# Patient Record
Sex: MALE | Race: WHITE | Employment: FULL TIME | URBAN - METROPOLITAN AREA
[De-identification: names, ages, dates, MRNs, and addresses within clinical notes are randomized per-mention and may not be internally consistent; named-entity substitution may affect disease eponyms.]

---

## 2017-09-29 ENCOUNTER — OFFICE VISIT (OUTPATIENT)
Dept: FAMILY MEDICINE CLINIC | Age: 37
End: 2017-09-29

## 2017-09-29 VITALS
HEIGHT: 65 IN | DIASTOLIC BLOOD PRESSURE: 70 MMHG | SYSTOLIC BLOOD PRESSURE: 108 MMHG | HEART RATE: 70 BPM | WEIGHT: 182 LBS | RESPIRATION RATE: 14 BRPM | BODY MASS INDEX: 30.32 KG/M2

## 2017-09-29 DIAGNOSIS — J30.1 SEASONAL ALLERGIC RHINITIS DUE TO POLLEN: ICD-10-CM

## 2017-09-29 DIAGNOSIS — Z23 NEED FOR INFLUENZA VACCINATION: ICD-10-CM

## 2017-09-29 DIAGNOSIS — Z00.00 WELL ADULT EXAM: Primary | ICD-10-CM

## 2017-09-29 DIAGNOSIS — E78.2 MIXED HYPERLIPIDEMIA: ICD-10-CM

## 2017-09-29 DIAGNOSIS — E55.9 VITAMIN D DEFICIENCY: ICD-10-CM

## 2017-09-29 LAB
A/G RATIO: 2.2 (ref 1.1–2.2)
ALBUMIN SERPL-MCNC: 4.8 G/DL (ref 3.4–5)
ALP BLD-CCNC: 67 U/L (ref 40–129)
ALT SERPL-CCNC: 15 U/L (ref 10–40)
ANION GAP SERPL CALCULATED.3IONS-SCNC: 16 MMOL/L (ref 3–16)
AST SERPL-CCNC: 16 U/L (ref 15–37)
BILIRUB SERPL-MCNC: 0.4 MG/DL (ref 0–1)
BUN BLDV-MCNC: 13 MG/DL (ref 7–20)
CALCIUM SERPL-MCNC: 9.7 MG/DL (ref 8.3–10.6)
CHLORIDE BLD-SCNC: 102 MMOL/L (ref 99–110)
CHOLESTEROL, TOTAL: 185 MG/DL (ref 0–199)
CO2: 24 MMOL/L (ref 21–32)
CREAT SERPL-MCNC: 0.9 MG/DL (ref 0.9–1.3)
GFR AFRICAN AMERICAN: >60
GFR NON-AFRICAN AMERICAN: >60
GLOBULIN: 2.2 G/DL
GLUCOSE BLD-MCNC: 90 MG/DL (ref 70–99)
HDLC SERPL-MCNC: 56 MG/DL (ref 40–60)
LDL CHOLESTEROL CALCULATED: 103 MG/DL
POTASSIUM SERPL-SCNC: 4.2 MMOL/L (ref 3.5–5.1)
SODIUM BLD-SCNC: 142 MMOL/L (ref 136–145)
TOTAL PROTEIN: 7 G/DL (ref 6.4–8.2)
TRIGL SERPL-MCNC: 129 MG/DL (ref 0–150)
VITAMIN D 25-HYDROXY: 22.4 NG/ML
VLDLC SERPL CALC-MCNC: 26 MG/DL

## 2017-09-29 PROCEDURE — 36415 COLL VENOUS BLD VENIPUNCTURE: CPT | Performed by: FAMILY MEDICINE

## 2017-09-29 PROCEDURE — 90471 IMMUNIZATION ADMIN: CPT | Performed by: FAMILY MEDICINE

## 2017-09-29 PROCEDURE — 90630 INFLUENZA, QUADV, 18-64 YRS, ID, PF, MICRO INJ, 0.1ML (FLUZONE QUADV, PF): CPT | Performed by: FAMILY MEDICINE

## 2017-09-29 PROCEDURE — 99395 PREV VISIT EST AGE 18-39: CPT | Performed by: FAMILY MEDICINE

## 2017-09-29 RX ORDER — FENOFIBRATE 160 MG/1
TABLET ORAL
Qty: 30 TABLET | Refills: 2 | OUTPATIENT
Start: 2017-09-29

## 2017-09-29 ASSESSMENT — PATIENT HEALTH QUESTIONNAIRE - PHQ9
SUM OF ALL RESPONSES TO PHQ9 QUESTIONS 1 & 2: 0
SUM OF ALL RESPONSES TO PHQ QUESTIONS 1-9: 0
1. LITTLE INTEREST OR PLEASURE IN DOING THINGS: 0
2. FEELING DOWN, DEPRESSED OR HOPELESS: 0

## 2017-10-02 RX ORDER — FENOFIBRATE 160 MG/1
TABLET ORAL
Qty: 30 TABLET | Refills: 2 | Status: SHIPPED | OUTPATIENT
Start: 2017-10-02 | End: 2017-12-28 | Stop reason: SDUPTHER

## 2018-01-01 RX ORDER — FENOFIBRATE 160 MG/1
TABLET ORAL
Qty: 30 TABLET | Refills: 8 | Status: SHIPPED | OUTPATIENT
Start: 2018-01-01 | End: 2018-11-24 | Stop reason: SDUPTHER

## 2018-04-16 ENCOUNTER — OFFICE VISIT (OUTPATIENT)
Dept: FAMILY MEDICINE CLINIC | Age: 38
End: 2018-04-16

## 2018-04-16 VITALS
SYSTOLIC BLOOD PRESSURE: 110 MMHG | HEART RATE: 84 BPM | OXYGEN SATURATION: 98 % | WEIGHT: 178 LBS | HEIGHT: 65 IN | BODY MASS INDEX: 29.66 KG/M2 | DIASTOLIC BLOOD PRESSURE: 70 MMHG | RESPIRATION RATE: 20 BRPM

## 2018-04-16 DIAGNOSIS — L01.00 IMPETIGO: Primary | ICD-10-CM

## 2018-04-16 PROCEDURE — 99213 OFFICE O/P EST LOW 20 MIN: CPT | Performed by: NURSE PRACTITIONER

## 2018-04-23 ENCOUNTER — OFFICE VISIT (OUTPATIENT)
Dept: FAMILY MEDICINE CLINIC | Age: 38
End: 2018-04-23

## 2018-04-23 VITALS
HEART RATE: 89 BPM | DIASTOLIC BLOOD PRESSURE: 82 MMHG | RESPIRATION RATE: 20 BRPM | OXYGEN SATURATION: 98 % | SYSTOLIC BLOOD PRESSURE: 122 MMHG | WEIGHT: 177.6 LBS | HEIGHT: 65 IN | BODY MASS INDEX: 29.59 KG/M2

## 2018-04-23 DIAGNOSIS — L98.9 SKIN LESION OF CHEEK: Primary | ICD-10-CM

## 2018-04-23 PROCEDURE — 99213 OFFICE O/P EST LOW 20 MIN: CPT | Performed by: NURSE PRACTITIONER

## 2018-04-23 RX ORDER — SULFAMETHOXAZOLE AND TRIMETHOPRIM 800; 160 MG/1; MG/1
1 TABLET ORAL 2 TIMES DAILY
Qty: 14 TABLET | Refills: 0 | Status: SHIPPED | OUTPATIENT
Start: 2018-04-23 | End: 2018-04-30

## 2018-04-26 LAB
GRAM STAIN RESULT: ABNORMAL
WOUND/ABSCESS: ABNORMAL

## 2018-10-03 ENCOUNTER — OFFICE VISIT (OUTPATIENT)
Dept: FAMILY MEDICINE CLINIC | Age: 38
End: 2018-10-03
Payer: COMMERCIAL

## 2018-10-03 VITALS
SYSTOLIC BLOOD PRESSURE: 116 MMHG | RESPIRATION RATE: 16 BRPM | OXYGEN SATURATION: 98 % | HEIGHT: 65 IN | DIASTOLIC BLOOD PRESSURE: 80 MMHG | TEMPERATURE: 98.5 F | WEIGHT: 175.6 LBS | BODY MASS INDEX: 29.26 KG/M2 | HEART RATE: 62 BPM

## 2018-10-03 DIAGNOSIS — E78.2 MIXED HYPERLIPIDEMIA: ICD-10-CM

## 2018-10-03 DIAGNOSIS — Z00.00 ANNUAL PHYSICAL EXAM: Primary | ICD-10-CM

## 2018-10-03 DIAGNOSIS — Z23 NEEDS FLU SHOT: ICD-10-CM

## 2018-10-03 DIAGNOSIS — E55.9 VITAMIN D DEFICIENCY: ICD-10-CM

## 2018-10-03 DIAGNOSIS — L30.9 ECZEMA, UNSPECIFIED TYPE: ICD-10-CM

## 2018-10-03 LAB
A/G RATIO: 2.6 (ref 1.1–2.2)
ALBUMIN SERPL-MCNC: 4.9 G/DL (ref 3.4–5)
ALP BLD-CCNC: 74 U/L (ref 40–129)
ALT SERPL-CCNC: 15 U/L (ref 10–40)
ANION GAP SERPL CALCULATED.3IONS-SCNC: 13 MMOL/L (ref 3–16)
AST SERPL-CCNC: 19 U/L (ref 15–37)
BILIRUB SERPL-MCNC: 0.4 MG/DL (ref 0–1)
BUN BLDV-MCNC: 13 MG/DL (ref 7–20)
CALCIUM SERPL-MCNC: 9.7 MG/DL (ref 8.3–10.6)
CHLORIDE BLD-SCNC: 106 MMOL/L (ref 99–110)
CHOLESTEROL, TOTAL: 157 MG/DL (ref 0–199)
CO2: 25 MMOL/L (ref 21–32)
CREAT SERPL-MCNC: 1 MG/DL (ref 0.9–1.3)
GFR AFRICAN AMERICAN: >60
GFR NON-AFRICAN AMERICAN: >60
GLOBULIN: 1.9 G/DL
GLUCOSE BLD-MCNC: 78 MG/DL (ref 70–99)
HDLC SERPL-MCNC: 56 MG/DL (ref 40–60)
LDL CHOLESTEROL CALCULATED: 83 MG/DL
POTASSIUM SERPL-SCNC: 4.4 MMOL/L (ref 3.5–5.1)
SODIUM BLD-SCNC: 144 MMOL/L (ref 136–145)
TOTAL PROTEIN: 6.8 G/DL (ref 6.4–8.2)
TRIGL SERPL-MCNC: 91 MG/DL (ref 0–150)
VITAMIN D 25-HYDROXY: 33.7 NG/ML
VLDLC SERPL CALC-MCNC: 18 MG/DL

## 2018-10-03 PROCEDURE — 99395 PREV VISIT EST AGE 18-39: CPT | Performed by: NURSE PRACTITIONER

## 2018-10-03 PROCEDURE — 90471 IMMUNIZATION ADMIN: CPT | Performed by: NURSE PRACTITIONER

## 2018-10-03 PROCEDURE — 90688 IIV4 VACCINE SPLT 0.5 ML IM: CPT | Performed by: NURSE PRACTITIONER

## 2018-10-03 PROCEDURE — 36415 COLL VENOUS BLD VENIPUNCTURE: CPT | Performed by: NURSE PRACTITIONER

## 2018-10-03 ASSESSMENT — PATIENT HEALTH QUESTIONNAIRE - PHQ9
2. FEELING DOWN, DEPRESSED OR HOPELESS: 0
SUM OF ALL RESPONSES TO PHQ9 QUESTIONS 1 & 2: 0
SUM OF ALL RESPONSES TO PHQ QUESTIONS 1-9: 0
1. LITTLE INTEREST OR PLEASURE IN DOING THINGS: 0
SUM OF ALL RESPONSES TO PHQ QUESTIONS 1-9: 0

## 2018-10-07 LAB
2000687N OAK TREE IGE: 3.66 KU/L
ALLERGEN ASPERGILLUS ALTERNATA IGE: <0.1 KU/L
ALLERGEN ASPERGILLUS FUMIGATUS IGE: <0.1 KU/L
ALLERGEN BERMUDA GRASS IGE: 0.23 KU/L
ALLERGEN BIRCH IGE: 2.77 KU/L
ALLERGEN CAT DANDER IGE: 0.49 KU/L
ALLERGEN CLAMS IGE: <0.1 KU/L
ALLERGEN CODFISH IGE: <0.1 KU/L
ALLERGEN COMMON SHORT RAGWEED IGE: 0.34 KU/L
ALLERGEN CORN IGE: 0.53 KU/L
ALLERGEN COTTONWOOD: 0.16 KU/L
ALLERGEN COW MILK IGE: <0.1 KU/L
ALLERGEN DOG DANDER IGE: 1.24 KU/L
ALLERGEN EGG WHITE IGE: <0.1 KU/L
ALLERGEN ELM IGE: 1.38 KU/L
ALLERGEN FUNGI/MOLD M.RACEMOSUS IGE: <0.1 KU/L
ALLERGEN GERMAN COCKROACH IGE: <0.1 KU/L
ALLERGEN HORMODENDRUM HORDEI IGE: <0.1 KU/L
ALLERGEN MAPLE/BOX ELDER IGE: 1.58 KU/L
ALLERGEN MITE DUST FARINAE IGE: <0.1 KU/L
ALLERGEN MITE DUST PTERONYSSINUS IGE: <0.1 KU/L
ALLERGEN MOUNTAIN CEDAR: 0.12 KU/L
ALLERGEN MOUSE EPITHELIA IGE: <0.1 KU/L
ALLERGEN PEANUT (F13) IGE: 0.23 KU/L
ALLERGEN PECAN TREE IGE: 0.92 KU/L
ALLERGEN PENICILLIUM NOTATUM: <0.1 KU/L
ALLERGEN ROUGH PIGWEED (W14) IGE: 0.31 KU/L
ALLERGEN RUSSIAN THISTLE IGE: 0.25 KU/L
ALLERGEN SCALLOP IGE: <0.1 KU/L
ALLERGEN SEE NOTE: ABNORMAL
ALLERGEN SHEEP SORREL (W18) IGE: <0.1 KU/L
ALLERGEN SHRIMP IGE: <0.1 KU/L
ALLERGEN SOYBEAN IGE: <0.1 KU/L
ALLERGEN TIMOTHY GRASS: <0.1 KU/L
ALLERGEN TREE SYCAMORE: 0.33 KU/L
ALLERGEN WALNUT IGE: 0.54 KU/L
ALLERGEN WALNUT TREE IGE: 0.81 KU/L
ALLERGEN WHEAT IGE: 0.12 KU/L
ALLERGEN WHITE MULBERRY TREE, IGE: 0.24 KU/L
ALLERGEN, TREE, WHITE ASH IGE: 0.19 KU/L
IGE: 61 KU/L

## 2018-11-26 RX ORDER — FENOFIBRATE 160 MG/1
TABLET ORAL
Qty: 30 TABLET | Refills: 7 | Status: SHIPPED | OUTPATIENT
Start: 2018-11-26 | End: 2018-12-03 | Stop reason: ALTCHOICE

## 2018-12-03 ENCOUNTER — OFFICE VISIT (OUTPATIENT)
Dept: FAMILY MEDICINE CLINIC | Age: 38
End: 2018-12-03
Payer: COMMERCIAL

## 2018-12-03 VITALS — WEIGHT: 182.2 LBS | BODY MASS INDEX: 30.35 KG/M2 | HEIGHT: 65 IN

## 2018-12-03 DIAGNOSIS — Z11.59 NEED FOR HEPATITIS B SCREENING TEST: ICD-10-CM

## 2018-12-03 DIAGNOSIS — L30.9 CHRONIC DERMATITIS: Primary | ICD-10-CM

## 2018-12-03 DIAGNOSIS — Z11.59 ENCOUNTER FOR HEPATITIS C SCREENING TEST FOR LOW RISK PATIENT: ICD-10-CM

## 2018-12-03 DIAGNOSIS — Z11.4 SCREENING FOR HIV (HUMAN IMMUNODEFICIENCY VIRUS): ICD-10-CM

## 2018-12-03 DIAGNOSIS — Z11.3 SCREENING EXAMINATION FOR STD (SEXUALLY TRANSMITTED DISEASE): ICD-10-CM

## 2018-12-03 DIAGNOSIS — Z12.5 SCREENING PSA (PROSTATE SPECIFIC ANTIGEN): ICD-10-CM

## 2018-12-03 LAB
BASOPHILS ABSOLUTE: 0 K/UL (ref 0–0.2)
BASOPHILS RELATIVE PERCENT: 0.5 %
EOSINOPHILS ABSOLUTE: 0.1 K/UL (ref 0–0.6)
EOSINOPHILS RELATIVE PERCENT: 1.9 %
HCT VFR BLD CALC: 44.2 % (ref 40.5–52.5)
HEMOGLOBIN: 14.7 G/DL (ref 13.5–17.5)
HEPATITIS B SURFACE ANTIGEN INTERPRETATION: NORMAL
HEPATITIS C ANTIBODY INTERPRETATION: NORMAL
LYMPHOCYTES ABSOLUTE: 1.5 K/UL (ref 1–5.1)
LYMPHOCYTES RELATIVE PERCENT: 27.3 %
MCH RBC QN AUTO: 30.7 PG (ref 26–34)
MCHC RBC AUTO-ENTMCNC: 33.1 G/DL (ref 31–36)
MCV RBC AUTO: 92.7 FL (ref 80–100)
MONOCYTES ABSOLUTE: 0.4 K/UL (ref 0–1.3)
MONOCYTES RELATIVE PERCENT: 6.9 %
NEUTROPHILS ABSOLUTE: 3.5 K/UL (ref 1.7–7.7)
NEUTROPHILS RELATIVE PERCENT: 63.4 %
PDW BLD-RTO: 12.9 % (ref 12.4–15.4)
PLATELET # BLD: 218 K/UL (ref 135–450)
PMV BLD AUTO: 9.1 FL (ref 5–10.5)
PROSTATE SPECIFIC ANTIGEN: 1 NG/ML (ref 0–4)
RBC # BLD: 4.77 M/UL (ref 4.2–5.9)
WBC # BLD: 5.5 K/UL (ref 4–11)

## 2018-12-03 PROCEDURE — 99213 OFFICE O/P EST LOW 20 MIN: CPT | Performed by: NURSE PRACTITIONER

## 2018-12-03 PROCEDURE — 36415 COLL VENOUS BLD VENIPUNCTURE: CPT | Performed by: NURSE PRACTITIONER

## 2018-12-04 LAB
HIV AG/AB: NORMAL
HIV ANTIGEN: NORMAL
HIV-1 ANTIBODY: NORMAL
HIV-2 AB: NORMAL
TOTAL SYPHILLIS IGG/IGM: NORMAL

## 2020-01-20 ENCOUNTER — OFFICE VISIT (OUTPATIENT)
Dept: PRIMARY CARE CLINIC | Age: 40
End: 2020-01-20
Payer: COMMERCIAL

## 2020-01-20 VITALS
SYSTOLIC BLOOD PRESSURE: 118 MMHG | BODY MASS INDEX: 31.49 KG/M2 | HEART RATE: 68 BPM | DIASTOLIC BLOOD PRESSURE: 78 MMHG | OXYGEN SATURATION: 99 % | HEIGHT: 65 IN | WEIGHT: 189 LBS | TEMPERATURE: 97.7 F

## 2020-01-20 DIAGNOSIS — E78.2 MIXED HYPERLIPIDEMIA: Chronic | ICD-10-CM

## 2020-01-20 DIAGNOSIS — E78.1 HYPERTRIGLYCERIDEMIA: ICD-10-CM

## 2020-01-20 PROBLEM — L20.84 INTRINSIC ATOPIC DERMATITIS: Chronic | Status: ACTIVE | Noted: 2020-01-20

## 2020-01-20 LAB
A/G RATIO: 2 (ref 1.1–2.2)
ALBUMIN SERPL-MCNC: 4.6 G/DL (ref 3.4–5)
ALP BLD-CCNC: 78 U/L (ref 40–129)
ALT SERPL-CCNC: 18 U/L (ref 10–40)
ANION GAP SERPL CALCULATED.3IONS-SCNC: 14 MMOL/L (ref 3–16)
AST SERPL-CCNC: 19 U/L (ref 15–37)
BASOPHILS ABSOLUTE: 0 K/UL (ref 0–0.2)
BASOPHILS RELATIVE PERCENT: 0.4 %
BILIRUB SERPL-MCNC: 0.4 MG/DL (ref 0–1)
BUN BLDV-MCNC: 15 MG/DL (ref 7–20)
CALCIUM SERPL-MCNC: 9.5 MG/DL (ref 8.3–10.6)
CHLORIDE BLD-SCNC: 100 MMOL/L (ref 99–110)
CHOLESTEROL, TOTAL: 212 MG/DL (ref 0–199)
CO2: 25 MMOL/L (ref 21–32)
CREAT SERPL-MCNC: 0.8 MG/DL (ref 0.9–1.3)
EOSINOPHILS ABSOLUTE: 0.1 K/UL (ref 0–0.6)
EOSINOPHILS RELATIVE PERCENT: 2 %
GFR AFRICAN AMERICAN: >60
GFR NON-AFRICAN AMERICAN: >60
GLOBULIN: 2.3 G/DL
GLUCOSE BLD-MCNC: 86 MG/DL (ref 70–99)
HCT VFR BLD CALC: 44.8 % (ref 40.5–52.5)
HDLC SERPL-MCNC: 49 MG/DL (ref 40–60)
HEMOGLOBIN: 15.1 G/DL (ref 13.5–17.5)
LDL CHOLESTEROL CALCULATED: 129 MG/DL
LYMPHOCYTES ABSOLUTE: 1.6 K/UL (ref 1–5.1)
LYMPHOCYTES RELATIVE PERCENT: 38.5 %
MCH RBC QN AUTO: 31.4 PG (ref 26–34)
MCHC RBC AUTO-ENTMCNC: 33.7 G/DL (ref 31–36)
MCV RBC AUTO: 92.9 FL (ref 80–100)
MONOCYTES ABSOLUTE: 0.3 K/UL (ref 0–1.3)
MONOCYTES RELATIVE PERCENT: 8.1 %
NEUTROPHILS ABSOLUTE: 2.1 K/UL (ref 1.7–7.7)
NEUTROPHILS RELATIVE PERCENT: 51 %
PDW BLD-RTO: 12.9 % (ref 12.4–15.4)
PLATELET # BLD: 186 K/UL (ref 135–450)
PMV BLD AUTO: 8.5 FL (ref 5–10.5)
POTASSIUM SERPL-SCNC: 4.2 MMOL/L (ref 3.5–5.1)
RBC # BLD: 4.83 M/UL (ref 4.2–5.9)
SODIUM BLD-SCNC: 139 MMOL/L (ref 136–145)
TOTAL PROTEIN: 6.9 G/DL (ref 6.4–8.2)
TRIGL SERPL-MCNC: 169 MG/DL (ref 0–150)
TSH SERPL DL<=0.05 MIU/L-ACNC: 1.19 UIU/ML (ref 0.27–4.2)
VLDLC SERPL CALC-MCNC: 34 MG/DL
WBC # BLD: 4.1 K/UL (ref 4–11)

## 2020-01-20 PROCEDURE — 99203 OFFICE O/P NEW LOW 30 MIN: CPT | Performed by: INTERNAL MEDICINE

## 2020-01-20 RX ORDER — DUPILUMAB 300 MG/2ML
300 INJECTION, SOLUTION SUBCUTANEOUS
COMMUNITY
Start: 2019-12-31

## 2020-01-20 ASSESSMENT — PATIENT HEALTH QUESTIONNAIRE - PHQ9
SUM OF ALL RESPONSES TO PHQ QUESTIONS 1-9: 0
2. FEELING DOWN, DEPRESSED OR HOPELESS: 0
SUM OF ALL RESPONSES TO PHQ QUESTIONS 1-9: 0
SUM OF ALL RESPONSES TO PHQ9 QUESTIONS 1 & 2: 0
1. LITTLE INTEREST OR PLEASURE IN DOING THINGS: 0

## 2020-01-20 NOTE — ASSESSMENT & PLAN NOTE
On dupixent, injections, sees allergy specialist,   Patient is compliant w medications, no side effects, effective, provides adequate symptom relief. No new symptoms or problems as noted by patient. The problem is stable, no changes noted by patient. Will consider monitoring labs and refill medications as appropriate. Patient counseled and will continue current plan.

## 2020-01-20 NOTE — PROGRESS NOTES
Non-medical: Not on file   Tobacco Use    Smoking status: Never Smoker    Smokeless tobacco: Former User   Substance and Sexual Activity    Alcohol use: Yes     Comment: occasional - 2-3 drinks weekly    Drug use: No    Sexual activity: Yes     Partners: Female   Lifestyle    Physical activity:     Days per week: Not on file     Minutes per session: Not on file    Stress: Not on file   Relationships    Social connections:     Talks on phone: Not on file     Gets together: Not on file     Attends Pent[employer]ostalism service: Not on file     Active member of club or organization: Not on file     Attends meetings of clubs or organizations: Not on file     Relationship status: Not on file    Intimate partner violence:     Fear of current or ex partner: Not on file     Emotionally abused: Not on file     Physically abused: Not on file     Forced sexual activity: Not on file   Other Topics Concern    Not on file   Social History Narrative    Not on file       Family History   Problem Relation Age of Onset    Heart Disease Father 48        ami    High Blood Pressure Father     High Cholesterol Father     Heart Failure Paternal Grandfather [de-identified]    Heart Disease Paternal Grandfather         CHF    High Blood Pressure Paternal Grandfather     High Cholesterol Paternal Grandfather     High Blood Pressure Mother     High Cholesterol Mother     High Cholesterol Brother     Emphysema Maternal Grandmother     High Blood Pressure Maternal Grandmother     High Cholesterol Maternal Grandmother     High Blood Pressure Maternal Grandfather     High Cholesterol Maternal Grandfather     Heart Disease Paternal Grandmother         CHF    High Blood Pressure Paternal Grandmother     High Cholesterol Paternal Grandmother     High Cholesterol Brother       Review of Systems  ROS: No unusual headaches or allergy symptoms or blurred vision. No prolonged cough.  No flushing or facial pain or chest pain,dizziness, dyspnea, palpitations, or chest pain on exertion. No syncope. No nausea or vommitting or diarrhea. No jaundice or abdominal pain, change in bowel habits, black or bloody stools. No dysuria or hematuria or frequency of urination. No myalgias or muscle pain. No numbness, weakness, or tingling. No falls, or loss of consciousness. No weight loss or back pain. No falls. No paresthesias. No joint swelling or redness. No joint pain. No recent weight loss. No focal weakness or sensory deficits or paresthesias, No confusion or altered sensorium. No hematemesis. No hearing loss. No siezures. All other systems were reviewed, and review was negative. Objective:   Physical Exam  /78 (Site: Left Upper Arm, Position: Sitting, Cuff Size: Medium Adult)   Pulse 68   Temp 97.7 °F (36.5 °C) (Oral)   Ht 5' 5.3\" (1.659 m)   Wt 189 lb (85.7 kg)   SpO2 99%   BMI 31.16 kg/m²    The physical exam reveals a patient who appears well, alert and oriented x 3, pleasant, cooperative. Vitals are as noted. Head is atraumatic and normocephalic. Eyes reveal normal conjunctiva, cornea normal, pupils are equal and rective to light. Nasal mucosa is normal. Throat is normal without exudates. Ears reveal normal tympanic membranes. Neck is supple and free of adenopathy, or masses. No thyromegaly. No jugular venous distension. Lungs are clear to auscultation, no rales or rhonchi noted. Heart sounds are regular , no murmurs, clicks, gallops or rubs. Abdomen is soft, no tenderness, masses or organomegaly. Bowel sounds are normally heard. Pelvis: normal. Extremities are normal. Peripheral pulses are normal. Screening neurological exam is normal without focal findings. Cranial nerves are intact, reflexes are symmetrical and muscle strength eaqual. Skin is normal without suspicious lesions noted.      Assessment:      Intrinsic atopic dermatitis  On dupixent, injections, sees allergy specialist,   Patient is compliant w medications, no side effects,

## 2020-01-21 LAB
ESTIMATED AVERAGE GLUCOSE: 105.4 MG/DL
HBA1C MFR BLD: 5.3 %

## 2020-02-21 ENCOUNTER — OFFICE VISIT (OUTPATIENT)
Dept: ORTHOPEDIC SURGERY | Age: 40
End: 2020-02-21
Payer: COMMERCIAL

## 2020-02-21 VITALS
DIASTOLIC BLOOD PRESSURE: 85 MMHG | SYSTOLIC BLOOD PRESSURE: 131 MMHG | HEIGHT: 65 IN | HEART RATE: 77 BPM | BODY MASS INDEX: 30.82 KG/M2 | WEIGHT: 185 LBS

## 2020-02-21 PROCEDURE — 99204 OFFICE O/P NEW MOD 45 MIN: CPT | Performed by: ORTHOPAEDIC SURGERY

## 2020-02-21 NOTE — PROGRESS NOTES
Date:  2020    Name:  Keysha Cardona  Address:  [address] [address]    :  1980      Age:   45 y.o.    SSN:        Medical Record Number:  [id number]    Reason for Visit:    Chief Complaint    Shoulder Pain (np right shoulder. )      DOS:2020     HPI: Keysha Cardona is a 45 y.o. male here today for evaluation of right shoulder pain that has been ongoing for 6 months. He developed pain at the end of last summer after drilling into his brick house. He complains of limited range of motion and weakness of his right shoulder as well as pain exacerbated with certain movements such as throwing, bowling and golf. No numbness and tingling. Pain Assessment  Location of Pain: Shoulder  Location Modifiers: Right  Severity of Pain: 3  Quality of Pain: Dull  Duration of Pain: Persistent  Frequency of Pain: Constant  Aggravating Factors: (raising arm )  Limiting Behavior: Yes  Relieving Factors: Rest  Result of Injury: Yes  Work-Related Injury: No  Are there other pain locations you wish to document?: No  ROS: Review of systems reviewed from Patient History Form completed today and available in the patient's chart under the Media tab.        Past Medical History:   Diagnosis Date    Mixed hyperlipidemia 2011    Seasonal allergic rhinitis     ait in past        Past Surgical History:   Procedure Laterality Date    DENTAL SURGERY      wisdom teeth    HAND SURGERY      bb removed left hand    LASIK      ou    VASECTOMY  2017       Family History   Problem Relation Age of Onset    Heart Disease Father 48        ami    High Blood Pressure Father     High Cholesterol Father     Heart Failure Paternal Grandfather [de-identified]    Heart Disease Paternal Grandfather         CHF    High Blood Pressure Paternal Grandfather     High Cholesterol Paternal Grandfather     High Blood Pressure Mother     High Cholesterol Mother     High Cholesterol Brother     Emphysema Maternal Grandmother     High Blood Pressure Maternal Grandmother     High Cholesterol Maternal Grandmother     High Blood Pressure Maternal Grandfather     High Cholesterol Maternal Grandfather     Heart Disease Paternal Grandmother         CHF    High Blood Pressure Paternal Grandmother     High Cholesterol Paternal Grandmother     High Cholesterol Brother        Social History     Socioeconomic History    Marital status:      Spouse name: None    Number of children: None    Years of education: None    Highest education level: None   Occupational History    None   Social Needs    Financial resource strain: None    Food insecurity:     Worry: None     Inability: None    Transportation needs:     Medical: None     Non-medical: None   Tobacco Use    Smoking status: Never Smoker    Smokeless tobacco: Former User   Substance and Sexual Activity    Alcohol use: Yes     Comment: occasional - 2-3 drinks weekly    Drug use: No    Sexual activity: Yes     Partners: Female   Lifestyle    Physical activity:     Days per week: None     Minutes per session: None    Stress: None   Relationships    Social connections:     Talks on phone: None     Gets together: None     Attends Protestant service: None     Active member of club or organization: None     Attends meetings of clubs or organizations: None     Relationship status: None    Intimate partner violence:     Fear of current or ex partner: None     Emotionally abused: None     Physically abused: None     Forced sexual activity: None   Other Topics Concern    None   Social History Narrative    None       Current Outpatient Medications   Medication Sig Dispense Refill    DUPIXENT 300 MG/2ML SOSY injection Inject 300 mLs as directed Twice a Week      Crisaborole (EUCRISA) 2 % OINT Apply twice daily to affected area(s) for facial eczema.  (Patient not taking: Reported on 1/20/2020) 60 g 1     No current facility-administered strength. Stability: No anterior instability. No posterior instability. Special Tests:  Crossover sign is negative. Belly press sign is negative. Lift off sign is negative. Impingement findings are negative. Labral findings are negative. Speed sign and Yergason signs are both negative. Vascular: The skin is warm dry and well perfused. Neurologic: Distally neurovascularly intact. Sensation is intact to light touch over the deltoid. Diagnostics:  Radiology:     Pertinent imaging reviewed. X-rays of the RIGHT shoulder including RIGHT Grashey, RIGHT scapular Y and RIGHT axillary views were obtained and reviewed in office:    Impression: No acute bony abnormalities appreciated on radiographic examination. Assessment: Right shoulder pain with rotator cuff tendonitis and possible labral tear    Plan: Pertinent imaging was reviewed. The etiology, natural history, and treatment options for the disorder were discussed. The roles of activity medication, antiinflammatories, injections, bracing, physical therapy, and surgical interventions were all described to the patient and questions were answered. I believe he is a candidate for an MRI to evaluate rotator cuff and labrum. He wishes to proceed with this entity. I will see him back following the MRI to review results, sooner if needed. Tylor Rowell is in agreement with this plan. All questions were answered to patient's satisfaction and was encouraged to call with any further questions. Orders Placed This Encounter   Procedures    XR SHOULDER RIGHT (MIN 2 VIEWS)     Standing Status:   Future     Number of Occurrences:   1     Standing Expiration Date:   2/21/2021       Brianne LLANES ATC, am scribing for and in the presence of Dr. Brandin Manriquez.    02/21/20 12:34 PM Brainne Coon ATC        I personally reviewed the patient's pain scale, review of systems, family history, social history, past medical history,

## 2020-03-03 ENCOUNTER — OFFICE VISIT (OUTPATIENT)
Dept: ORTHOPEDIC SURGERY | Age: 40
End: 2020-03-03
Payer: COMMERCIAL

## 2020-03-03 VITALS — WEIGHT: 185 LBS | BODY MASS INDEX: 30.82 KG/M2 | HEIGHT: 65 IN

## 2020-03-03 PROCEDURE — 99213 OFFICE O/P EST LOW 20 MIN: CPT | Performed by: ORTHOPAEDIC SURGERY

## 2020-03-03 NOTE — PROGRESS NOTES
[address]  History and Physical  Shoulder Pain    Date:  3/3/2020    Name:  Colby Saba  Address:  [address] [place] [address]    :  1980      Age:   45 y.o.    SSN:  [id number]      Medical Record Number:  [id number]    Reason for Visit:    Follow-up (mri right shoulder. no changes )      HPI:   Colby Saba is a 45y.o. year old male who presents to our office today complaining of  right shoulder pain. Patient today presents for MRI results of right shoulder. Patient initially injured it working on his house about 6 months ago. He has tried oral anti-inflammatories and home therapy type exercises with no improvement. Review of Systems:  A 14 point review of systems available in the scanned medical record as documented by the patient. The review is negative with the exception of those items mentioned in the History of Present Illness and Past Medical History. Past History:  Past Medical History:   Diagnosis Date    Mixed hyperlipidemia 2011    Seasonal allergic rhinitis     ait in past     Past Surgical History:   Procedure Laterality Date    DENTAL SURGERY      wisdom teeth    HAND SURGERY      bb removed left hand    LASIK      ou    VASECTOMY  2017     Current Outpatient Medications on File Prior to Visit   Medication Sig Dispense Refill    DUPIXENT 300 MG/2ML SOSY injection Inject 300 mLs as directed Twice a Week      Crisaborole (EUCRISA) 2 % OINT Apply twice daily to affected area(s) for facial eczema. (Patient not taking: Reported on 2020) 60 g 1     No current facility-administered medications on file prior to visit.       Social History     Socioeconomic History    Marital status:      Spouse name: Not on file    Number of children: Not on file    Years of education: Not on file    Highest education level: Not on file   Occupational History    Not on file   Social Needs    Financial Medications:    Current Outpatient Medications   Medication Sig Dispense Refill    DUPIXENT 300 MG/2ML SOSY injection Inject 300 mLs as directed Twice a Week      Crisaborole (EUCRISA) 2 % OINT Apply twice daily to affected area(s) for facial eczema. (Patient not taking: Reported on 1/20/2020) 60 g 1     No current facility-administered medications for this visit. Allergies:  No Known Allergies    Physical Exam:  There were no vitals filed for this visit. General: Marian May is a healthy and well appearing 45y.o. year old male who is sitting comfortably in our office in no acute distress. Alert and oriented. MS:  Evaluation of both shoulders reveals no gross deformity or signs of past trauma. Shoulder Examination:  rightShoulder: No abnormalities to inspection, no signs of atrophy or post-surgical scars. Patient is tenderness to rotator cuff insertion greater tuberosity in addition to in bicipital groove. He does have pain and weakness with supraspinatus testing and external rotation testing with elbow at the side. Sensation intact to light tough to the axillary, median, radial and ulnar nerve distributions. Palpable radial and ulnar pulses. Neuro: alert. oriented  Eyes: Extra-ocular muscles intact  Mouth: Oral mucosa moist. No perioral lesions  Pulm: Respirations unlabored and regular. Heart: Regular rate and rhythm   Skin: warm, well perfused    Laboratory:  No visits with results within 14 Day(s) from this visit.    Latest known visit with results is:   Orders Only on 01/20/2020   Component Date Value    Hemoglobin A1C 01/20/2020 5.3     eAG 01/20/2020 105.4     Cholesterol, Total 01/20/2020 212*    Triglycerides 01/20/2020 169*    HDL 01/20/2020 49     LDL Calculated 01/20/2020 129*    VLDL Cholesterol Calcula* 01/20/2020 34     TSH 01/20/2020 1.19     Sodium 01/20/2020 139     Potassium 01/20/2020 4.2     Chloride 01/20/2020 100     CO2 01/20/2020 25     Anion Gap The roles of activity medication, antiinflammatories, injections, bracing, physical therapy, and surgical interventions were all described to the patient and questions were answered. The patient is in agreement with this plan. All questions were answered to patient's satisfaction and was encouraged to call with any further questions. We discussed treatment options including conservative treatment with possible cortisone injection and physical therapy versus surgical treatment including shoulder arthroscopy with rotator cuff debridement wrist possible repair and open subpectoral biceps tenodesis and labral debridement. It is discussed the patient with his MRI findings that we may proceed with conservative treatment, but his current shoulder pathology has a lower likelihood to improve with conservative treatment. Due to the patient's longevity of symptoms, he states he would like to proceed with surgery. The risks and benefits of surgery as well as need for postoperative rehabilitation and nonsurgical alternatives were discussed with the patient who understood and consented to the operation. The postoperative rehabilitation protocol and the approximate timeline for recovery were all discussed. Patient is questions were answered. Gina Palma DO   Clinical Fellow, Orthopedic Sports Medicine  [address] [place] [address]  Date:    3/3/2020      The encounter with Colby Saba was supervised by Dr. Vasquez Saenz, who personally examined the patient and reviewed the plan. This dictation was performed with a verbal recognition program (DRAGON) and it was checked for errors. It is possible that there are still dictated errors within this office note. If so, please bring any errors to my attention for an addendum. All efforts were made to ensure that this office note is accurate.       I personally reviewed the patient's pain scale, review of systems, family history, social history,

## 2020-04-08 ENCOUNTER — TELEPHONE (OUTPATIENT)
Dept: ORTHOPEDIC SURGERY | Age: 40
End: 2020-04-08

## 2020-04-14 ENCOUNTER — TELEPHONE (OUTPATIENT)
Dept: ORTHOPEDIC SURGERY | Age: 40
End: 2020-04-14

## 2020-05-01 ENCOUNTER — OFFICE VISIT (OUTPATIENT)
Dept: PRIMARY CARE CLINIC | Age: 40
End: 2020-05-01
Payer: COMMERCIAL

## 2020-05-01 VITALS
RESPIRATION RATE: 15 BRPM | WEIGHT: 186 LBS | BODY MASS INDEX: 30.99 KG/M2 | HEIGHT: 65 IN | DIASTOLIC BLOOD PRESSURE: 80 MMHG | SYSTOLIC BLOOD PRESSURE: 136 MMHG | HEART RATE: 81 BPM | OXYGEN SATURATION: 99 %

## 2020-05-01 PROBLEM — S46.011A TRAUMATIC INCOMPLETE TEAR OF RIGHT ROTATOR CUFF: Chronic | Status: ACTIVE | Noted: 2020-05-01

## 2020-05-01 PROCEDURE — 99213 OFFICE O/P EST LOW 20 MIN: CPT | Performed by: INTERNAL MEDICINE

## 2020-05-01 RX ORDER — FENOFIBRATE 160 MG/1
TABLET ORAL
Qty: 30 TABLET | Refills: 5 | Status: SHIPPED | OUTPATIENT
Start: 2020-05-01 | End: 2020-12-31 | Stop reason: SDUPTHER

## 2020-05-01 NOTE — ASSESSMENT & PLAN NOTE
On flonase and claritin,  Patient is compliant w medications, no side effects, effective, provides adequate symptom relief. No new symptoms or problems as noted by patient. The problem is stable, no changes noted by patient. Will consider monitoring labs and refill medications as appropriate. Patient counseled and will continue current plan.

## 2020-05-01 NOTE — ASSESSMENT & PLAN NOTE
On eucrissa and dupixent,  Patient is compliant w medications, no side effects, effective, provides adequate symptom relief. No new symptoms or problems as noted by patient. The problem is stable, no changes noted by patient. Will consider monitoring labs and refill medications as appropriate. Patient counseled and will continue current plan.

## 2020-05-01 NOTE — PROGRESS NOTES
Subjective:        Reason for visit. Jesse Sullivan is a 45 y.o. male who presents for a preoperative physical examination. He is scheduled to have right rotator cuff repair  done by Dr. Varghese Young at [facility]  on 5/6/20. History of Present Illness:      Here for a pre op eval for the above surgery,  He is medically stable. No acute illness,   Traumatic incomplete tear of right rotator cuff  Here for a pre op eval for the rotator cuff repair,  He is medically stable, for the surgery,      Intrinsic atopic dermatitis  On eucrissa and dupixent,  Patient is compliant w medications, no side effects, effective, provides adequate symptom relief. No new symptoms or problems as noted by patient. The problem is stable, no changes noted by patient. Will consider monitoring labs and refill medications as appropriate. Patient counseled and will continue current plan. Allergic rhinitis  On flonase and claritin,  Patient is compliant w medications, no side effects, effective, provides adequate symptom relief. No new symptoms or problems as noted by patient. The problem is stable, no changes noted by patient. Will consider monitoring labs and refill medications as appropriate. Patient counseled and will continue current plan. Past Medical History:   Diagnosis Date    Mixed hyperlipidemia 12/9/2011    Seasonal allergic rhinitis     ait in past    Traumatic incomplete tear of right rotator cuff 5/1/2020      No  previous anesthesia complications.        Past Surgical History:   Procedure Laterality Date    DENTAL SURGERY      wisdom teeth    HAND SURGERY  2008    bb removed left hand    LASIK  2008    ou    VASECTOMY  02/2017                                                   Current Outpatient Medications   Medication Sig Dispense Refill    fenofibrate (TRIGLIDE) 160 MG tablet TAKE 1 TABLET BY MOUTH ONE TIME A DAY 30 tablet 5    DUPIXENT 300 MG/2ML SOSY injection Inject 300 mLs as directed Twice a No masses, lesions, tenderness or abnormalities  Eyes - conjunctivae/corneas clear. PERRL, EOM's intact. Ears - External ears normal. Canals clear. TM's normal.  Nose/Sinuses - Nares normal. Septum midline. Mucosa normal. No drainage or sinus tenderness. Oropharynx - Lips, mucosa, and tongue normal. Teeth and gums normal. Oropharynx pink and patent  Neck - Neck supple. No adenopathy. Thyroid symmetric, normal size,  Back - Back symmetric, no curvature. ROM normal. No CVA tenderness. Lungs - Percussion normal. Good diaphragmatic excursion. Lungs clear  Heart - Regular rate and rhythm, with no rub, murmur or gallop noted. Abdomen - Abdomen soft, non-tender. BS normal. No masses, organomegaly  Extremities - Extremities normal. No deformities, edema, or skin discolora  Musculoskeletal - Spine ROM normal. Muscular strength intact. Peripheral pulses - radial=2+,, femoral=2+, popliteal=2+, dorsalis pedis=2+,  Neuro - Gait normal. Reflexes normal and symmetric. Sensation grossly normal.  No focal weakness    EKG: not needed. BLOOD WORK: not needed. covid test to be done. Assessment:      Pre op eval  Traumatic incomplete tear of right rotator cuff  Here for a pre op eval for the rotator cuff repair,  He is medically stable, for the surgery,      Intrinsic atopic dermatitis  On eucrissa and dupixent,  Patient is compliant w medications, no side effects, effective, provides adequate symptom relief. No new symptoms or problems as noted by patient. The problem is stable, no changes noted by patient. Will consider monitoring labs and refill medications as appropriate. Patient counseled and will continue current plan. Allergic rhinitis  On flonase and claritin,  Patient is compliant w medications, no side effects, effective, provides adequate symptom relief. No new symptoms or problems as noted by patient. The problem is stable, no changes noted by patient.  Will consider monitoring labs and refill medications

## 2020-05-04 ENCOUNTER — TELEPHONE (OUTPATIENT)
Dept: ORTHOPEDIC SURGERY | Age: 40
End: 2020-05-04

## 2020-05-04 ENCOUNTER — OFFICE VISIT (OUTPATIENT)
Dept: PRIMARY CARE CLINIC | Age: 40
End: 2020-05-04

## 2020-05-04 ENCOUNTER — OFFICE VISIT (OUTPATIENT)
Dept: ORTHOPEDIC SURGERY | Age: 40
End: 2020-05-04
Payer: COMMERCIAL

## 2020-05-04 VITALS — HEIGHT: 65 IN | WEIGHT: 185 LBS | TEMPERATURE: 98 F | BODY MASS INDEX: 30.82 KG/M2

## 2020-05-04 PROCEDURE — 99999 PR OFFICE/OUTPT VISIT,PROCEDURE ONLY: CPT | Performed by: NURSE PRACTITIONER

## 2020-05-04 PROCEDURE — L3670 SO ACRO/CLAV CAN WEB PRE OTS: HCPCS | Performed by: ORTHOPAEDIC SURGERY

## 2020-05-04 PROCEDURE — 99213 OFFICE O/P EST LOW 20 MIN: CPT | Performed by: ORTHOPAEDIC SURGERY

## 2020-05-04 PROCEDURE — MISCCOLD COLD THERAPY UNIT AND PAD: Performed by: ORTHOPAEDIC SURGERY

## 2020-05-04 NOTE — PROGRESS NOTES
[facility] / [facility] ([facility]) [address], [facility]    Acknowledgment of Informed Consent for Surgical or Medical Procedure and Sedation  I agree to allow doctor(s) Eliu Leigh and his/her associates or assistants, including residents and/or other qualified medical practitioner to perform the following medical treatment or procedure and to administer or direct the administration of sedation as necessary:  Procedure(s): RIGHT SHOULDER SCOPE/ ROTATOR CUFF REPAIR/ BICEPS TENODESIS/ LABRAL DEBRIDEMENT/ SUBACROMIAL DECOMPRESSION   My doctor has explained the following regarding the proposed procedure:   the explanation of the procedure   the benefits of the procedure   the potential problems that might occur during recuperation   the risks and side effects of the procedure which could include but are not limited to severe blood loss, infection, stroke or death   the benefits, risks and side effect of alternative procedures including the consequences of declining this procedure or any alternative procedures   the likelihood of achieving satisfactory results. I acknowledge no guarantee or assurance has been made to me regarding the results. I understand that during the course of this treatment/procedure, unforeseen conditions can occur which require an additional or different procedure. I agree to allow my physician or assistants to perform such extension of the original procedure as they may find necessary. I understand that sedation will often result in temporary impairment of memory and fine motor skills and that sedation can occasionally progress to a state of deep sedation or general anesthesia. I understand the risks of anesthesia for surgery include, but are not limited to, sore throat, hoarseness, injury to face, mouth, or teeth; nausea; headache; injury to blood vessels or nerves; death, brain damage, or paralysis.     I understand that if I have a Limitation of

## 2020-05-04 NOTE — PROGRESS NOTES
Apply twice daily to affected area(s) for facial eczema. 60 g 1     No current facility-administered medications for this visit. No Known Allergies    Vital signs:  Temp 98 °F (36.7 °C)   Ht 5' 5\" (1.651 m)   Wt 185 lb (83.9 kg)   BMI 30.79 kg/m²          Neuro: Alert & oriented x 3,  normal,  no focal deficits noted. Normal affect. Eyes: sclera clear  Ears: Normal external ear  Mouth:  No perioral lesions  Pulm: Respirations unlabored and regular  Pulse: Extremities well perfused. 2+ peripheral pulses. Skin: Warm. No ulcerations. Constitutional: The physical examination finds the patient to be well-developed and well-nourished. The patient is alert and oriented x3 and was cooperative throughout the visit. Right shoulder exam    Inspection:  Held in a normal posture. Normal contour at the acromioclavicular joint. No swelling, ecchymosis, or erythema about the shoulder. No atrophy appreciated. No scapular winging. Palpation:  No subacromial crepitus. No tenderness of the AC joint. No greater tuberosity tenderness. tenderness in the bicipital groove. Range of Motion: Full passive and active ROM. Normal scapulothoracic rhythm. Strength:  Normal supraspinatus, infraspinatus, and subscapularis muscle strength. Stability: No anterior instability. No posterior instability. Special Tests: Impingement findings are positive . Labral findings are positive. Speed sign and Yergason signs are both negative. Crossover sign is negative. Belly press sign is positive. Lift off sign is negative. Other findings: The skin is warm dry and well perfused. Distally neurovascularly intact. Sensation is intact to light touch over the deltoid. LEFT comparison shoulder exam    Inspection:  Held in a normal posture. Normal contour at the acromioclavicular joint. No swelling, ecchymosis, or erythema about the shoulder. No atrophy appreciated. No scapular winging.      Palpation:  No subacromial crepitus. No tenderness of the AC joint. No greater tuberosity tenderness. No tenderness in the bicipital groove. Range of Motion: Full passive and active ROM. Normal scapulothoracic rhythm. Strength:  Normal supraspinatus, infraspinatus, and subscapularis muscle strength. Stability: No anterior instability. No posterior instability. Special Tests: Impingement findings are negative. Labral findings are negative. Speed sign and Yergason signs are both negative. Crossover sign is negative. Belly press sign is negative. Lift off sign is negative. Other findings: The skin is warm dry and well perfused. Distally neurovascularly intact. Sensation is intact to light touch over the deltoid. Diagnostics:  Radiology:     MRI Right Shoulder 2/28/2020:   FINDINGS:  AC joint osteoarthropathy is mild.  Capsulitis.  No osteolysis.  No fracture.       Frayed partial thickness interstitial tear supraspinatus insertion is 9mm.  Partial thickness    interstitial laminar tear subscapularis insertion is 6mm.  Mild peritendinobursitis.       Tendinopathic biceps arcuate segment.       Frayed posterosuperior labrum.  Flap tear posteroinferior labrum.       Capsulitis.  No soft tissue mass.       CONCLUSION:   1. Partial thickness frayed 9mm interstitial/concealed tear supraspinatus insertion  greater    than 70% of the tendon depth. Mild peritendinobursitis. 2. Slit-like 6mm interstitial laminar tear subscapularis insertion is less than 50% of the    tendon depth. 3. SLAP type 2B tear. Flap tear posteroinferior labrum. 4. Mild AC arthropathy. 5. Please see above. Assessment: 45 y.o. male with persistent right shoulder pain and weakness due to a high grade partial thickness tear of his rotator cuff, along with a type 2B slap tear. Pertinent imaging was reviewed. The etiology, natural history, and treatment options for the disorder were discussed.   The roles of activity medication, healthcare professional with expert knowledge and specialization in brace application while under the direct supervision of the treating physician. Verbal and written instructions for the use of and application of this item were provided. They were instructed to contact the office immediately should the brace result in increased pain, decreased sensation, increased swelling or worsening of the condition. Sincerely,    Patria Newton  [address]   Email: [email or web address]  Cell: [phone]    05/04/20  2:33 PM        During this examination, Patria Newton MD  functioned as a scribe for Dr. Dontae Rodriguez. The history taking and physical examination were performed by Dr. Dontae Rodriguez. All counseling during the appointment was performed between the patient and Dr. Dontae Rodriguez. 5/4/2020 2:27 PM      This dictation was performed with a verbal recognition program (DRAGON) and it was checked for errors. It is possible that there are still dictated errors within this office note. If so, please bring any errors to my for an addendum. All efforts were made to ensure that this office note is accurate. attention       I personally reviewed the patient's pain scale, review of systems, family history, social history, past medical history, allergies and medications. Review of systems was collected today, reviewed and is included in the medical record. It is available under the media tab. I personally performed and or supervised the services described in this documentation and scribed by the sports medicine fellow. Hattie Mccarthy MD  Sports Medicine, Arthroscopic Knee and Shoulder Surgery    This dictation was performed with a verbal recognition program [facility]) and it was checked for errors. It is possible that there are still dictated errors within this office note. If so, please bring any errors to my attention for an addendum.   All efforts were made to ensure that this office note is accurate.

## 2020-05-05 ENCOUNTER — ANESTHESIA EVENT (OUTPATIENT)
Dept: OPERATING ROOM | Age: 40
End: 2020-05-05
Payer: COMMERCIAL

## 2020-05-05 ENCOUNTER — OFFICE VISIT (OUTPATIENT)
Dept: PRIMARY CARE CLINIC | Age: 40
End: 2020-05-05

## 2020-05-05 LAB
SARS-COV-2: NOT DETECTED
SOURCE: NORMAL

## 2020-05-06 ENCOUNTER — HOSPITAL ENCOUNTER (OUTPATIENT)
Age: 40
Setting detail: OUTPATIENT SURGERY
Discharge: HOME | End: 2020-05-06
Attending: ORTHOPAEDIC SURGERY | Admitting: ORTHOPAEDIC SURGERY
Payer: COMMERCIAL

## 2020-05-06 ENCOUNTER — ANESTHESIA (OUTPATIENT)
Dept: OPERATING ROOM | Age: 40
End: 2020-05-06
Payer: COMMERCIAL

## 2020-05-06 VITALS
OXYGEN SATURATION: 94 % | WEIGHT: 186 LBS | RESPIRATION RATE: 13 BRPM | DIASTOLIC BLOOD PRESSURE: 76 MMHG | HEART RATE: 77 BPM | BODY MASS INDEX: 30.99 KG/M2 | SYSTOLIC BLOOD PRESSURE: 114 MMHG | HEIGHT: 65 IN | TEMPERATURE: 98 F

## 2020-05-06 VITALS — SYSTOLIC BLOOD PRESSURE: 117 MMHG | DIASTOLIC BLOOD PRESSURE: 77 MMHG | TEMPERATURE: 97.2 F | OXYGEN SATURATION: 99 %

## 2020-05-06 LAB
REPORT: NORMAL
SARS-COV-2: NOT DETECTED
THIS TEST SENT TO: NORMAL

## 2020-05-06 PROCEDURE — 2580000003 HC RX 258: Performed by: NURSE ANESTHETIST, CERTIFIED REGISTERED

## 2020-05-06 PROCEDURE — 7100000000 HC PACU RECOVERY - FIRST 15 MIN: Performed by: ORTHOPAEDIC SURGERY

## 2020-05-06 PROCEDURE — 6360000002 HC RX W HCPCS: Performed by: ORTHOPAEDIC SURGERY

## 2020-05-06 PROCEDURE — 2720000010 HC SURG SUPPLY STERILE: Performed by: ORTHOPAEDIC SURGERY

## 2020-05-06 PROCEDURE — 2709999900 HC NON-CHARGEABLE SUPPLY: Performed by: ORTHOPAEDIC SURGERY

## 2020-05-06 PROCEDURE — 6360000002 HC RX W HCPCS: Performed by: NURSE ANESTHETIST, CERTIFIED REGISTERED

## 2020-05-06 PROCEDURE — 2580000003 HC RX 258: Performed by: ANESTHESIOLOGY

## 2020-05-06 PROCEDURE — 7100000001 HC PACU RECOVERY - ADDTL 15 MIN: Performed by: ORTHOPAEDIC SURGERY

## 2020-05-06 PROCEDURE — 2500000003 HC RX 250 WO HCPCS: Performed by: NURSE ANESTHETIST, CERTIFIED REGISTERED

## 2020-05-06 PROCEDURE — 2500000003 HC RX 250 WO HCPCS: Performed by: ORTHOPAEDIC SURGERY

## 2020-05-06 PROCEDURE — 3700000001 HC ADD 15 MINUTES (ANESTHESIA): Performed by: ORTHOPAEDIC SURGERY

## 2020-05-06 PROCEDURE — 64415 NJX AA&/STRD BRCH PLXS IMG: CPT | Performed by: ANESTHESIOLOGY

## 2020-05-06 PROCEDURE — C1776 JOINT DEVICE (IMPLANTABLE): HCPCS | Performed by: ORTHOPAEDIC SURGERY

## 2020-05-06 PROCEDURE — 6360000002 HC RX W HCPCS: Performed by: ANESTHESIOLOGY

## 2020-05-06 PROCEDURE — 2580000003 HC RX 258: Performed by: ORTHOPAEDIC SURGERY

## 2020-05-06 PROCEDURE — 3700000000 HC ANESTHESIA ATTENDED CARE: Performed by: ORTHOPAEDIC SURGERY

## 2020-05-06 PROCEDURE — C1713 ANCHOR/SCREW BN/BN,TIS/BN: HCPCS | Performed by: ORTHOPAEDIC SURGERY

## 2020-05-06 PROCEDURE — 3600000004 HC SURGERY LEVEL 4 BASE: Performed by: ORTHOPAEDIC SURGERY

## 2020-05-06 PROCEDURE — 3600000014 HC SURGERY LEVEL 4 ADDTL 15MIN: Performed by: ORTHOPAEDIC SURGERY

## 2020-05-06 DEVICE — KIT IMPL SYS PROX TENODESIS W/ BICEPSBUTTON INSRT FIBERLOOP: Type: IMPLANTABLE DEVICE | Site: SHOULDER | Status: FUNCTIONAL

## 2020-05-06 DEVICE — ANCHOR SUT L14.7MM DIA5.5MM BIOCOMPOSITE FULL THRD W/ 1.3MM: Type: IMPLANTABLE DEVICE | Site: SHOULDER | Status: FUNCTIONAL

## 2020-05-06 RX ORDER — ONDANSETRON 2 MG/ML
4 INJECTION INTRAMUSCULAR; INTRAVENOUS
Status: DISCONTINUED | OUTPATIENT
Start: 2020-05-06 | End: 2020-05-06 | Stop reason: HOSPADM

## 2020-05-06 RX ORDER — LIDOCAINE HYDROCHLORIDE 20 MG/ML
INJECTION, SOLUTION INTRAVENOUS PRN
Status: DISCONTINUED | OUTPATIENT
Start: 2020-05-06 | End: 2020-05-06 | Stop reason: SDUPTHER

## 2020-05-06 RX ORDER — DOCUSATE SODIUM 100 MG/1
100 CAPSULE, LIQUID FILLED ORAL 2 TIMES DAILY PRN
Qty: 10 CAPSULE | Refills: 0 | Status: SHIPPED | OUTPATIENT
Start: 2020-05-06 | End: 2020-05-11

## 2020-05-06 RX ORDER — OXYCODONE HYDROCHLORIDE AND ACETAMINOPHEN 5; 325 MG/1; MG/1
1 TABLET ORAL EVERY 6 HOURS PRN
Qty: 28 TABLET | Refills: 0 | Status: SHIPPED | OUTPATIENT
Start: 2020-05-06 | End: 2020-05-13

## 2020-05-06 RX ORDER — FENTANYL CITRATE 50 UG/ML
INJECTION, SOLUTION INTRAMUSCULAR; INTRAVENOUS
Status: COMPLETED
Start: 2020-05-06 | End: 2020-05-06

## 2020-05-06 RX ORDER — 0.9 % SODIUM CHLORIDE 0.9 %
500 INTRAVENOUS SOLUTION INTRAVENOUS
Status: DISCONTINUED | OUTPATIENT
Start: 2020-05-06 | End: 2020-05-06 | Stop reason: HOSPADM

## 2020-05-06 RX ORDER — ROPIVACAINE HYDROCHLORIDE 5 MG/ML
INJECTION, SOLUTION EPIDURAL; INFILTRATION; PERINEURAL
Status: COMPLETED
Start: 2020-05-06 | End: 2020-05-06

## 2020-05-06 RX ORDER — ROCURONIUM BROMIDE 10 MG/ML
INJECTION, SOLUTION INTRAVENOUS PRN
Status: DISCONTINUED | OUTPATIENT
Start: 2020-05-06 | End: 2020-05-06 | Stop reason: SDUPTHER

## 2020-05-06 RX ORDER — MIDAZOLAM HYDROCHLORIDE 1 MG/ML
INJECTION INTRAMUSCULAR; INTRAVENOUS
Status: COMPLETED
Start: 2020-05-06 | End: 2020-05-06

## 2020-05-06 RX ORDER — PROMETHAZINE HYDROCHLORIDE 25 MG/ML
6.25 INJECTION, SOLUTION INTRAMUSCULAR; INTRAVENOUS
Status: DISCONTINUED | OUTPATIENT
Start: 2020-05-06 | End: 2020-05-06 | Stop reason: HOSPADM

## 2020-05-06 RX ORDER — ESMOLOL HYDROCHLORIDE 10 MG/ML
INJECTION INTRAVENOUS PRN
Status: DISCONTINUED | OUTPATIENT
Start: 2020-05-06 | End: 2020-05-06 | Stop reason: SDUPTHER

## 2020-05-06 RX ORDER — MIDAZOLAM HYDROCHLORIDE 1 MG/ML
INJECTION INTRAMUSCULAR; INTRAVENOUS PRN
Status: DISCONTINUED | OUTPATIENT
Start: 2020-05-06 | End: 2020-05-06 | Stop reason: SDUPTHER

## 2020-05-06 RX ORDER — FENTANYL CITRATE 50 UG/ML
INJECTION, SOLUTION INTRAMUSCULAR; INTRAVENOUS PRN
Status: DISCONTINUED | OUTPATIENT
Start: 2020-05-06 | End: 2020-05-06 | Stop reason: SDUPTHER

## 2020-05-06 RX ORDER — SODIUM CHLORIDE, SODIUM LACTATE, POTASSIUM CHLORIDE, CALCIUM CHLORIDE 600; 310; 30; 20 MG/100ML; MG/100ML; MG/100ML; MG/100ML
INJECTION, SOLUTION INTRAVENOUS CONTINUOUS PRN
Status: DISCONTINUED | OUTPATIENT
Start: 2020-05-06 | End: 2020-05-06 | Stop reason: SDUPTHER

## 2020-05-06 RX ORDER — ONDANSETRON 2 MG/ML
INJECTION INTRAMUSCULAR; INTRAVENOUS PRN
Status: DISCONTINUED | OUTPATIENT
Start: 2020-05-06 | End: 2020-05-06 | Stop reason: SDUPTHER

## 2020-05-06 RX ORDER — GLYCOPYRROLATE 0.2 MG/ML
INJECTION INTRAMUSCULAR; INTRAVENOUS PRN
Status: DISCONTINUED | OUTPATIENT
Start: 2020-05-06 | End: 2020-05-06 | Stop reason: SDUPTHER

## 2020-05-06 RX ORDER — SODIUM CHLORIDE 0.9 % (FLUSH) 0.9 %
10 SYRINGE (ML) INJECTION PRN
Status: DISCONTINUED | OUTPATIENT
Start: 2020-05-06 | End: 2020-05-06 | Stop reason: HOSPADM

## 2020-05-06 RX ORDER — ROPIVACAINE HYDROCHLORIDE 5 MG/ML
INJECTION, SOLUTION EPIDURAL; INFILTRATION; PERINEURAL PRN
Status: DISCONTINUED | OUTPATIENT
Start: 2020-05-06 | End: 2020-05-06 | Stop reason: SDUPTHER

## 2020-05-06 RX ORDER — SODIUM CHLORIDE 9 MG/ML
INJECTION, SOLUTION INTRAVENOUS CONTINUOUS
Status: DISCONTINUED | OUTPATIENT
Start: 2020-05-06 | End: 2020-05-06 | Stop reason: HOSPADM

## 2020-05-06 RX ORDER — SODIUM CHLORIDE 0.9 % (FLUSH) 0.9 %
10 SYRINGE (ML) INJECTION EVERY 12 HOURS SCHEDULED
Status: DISCONTINUED | OUTPATIENT
Start: 2020-05-06 | End: 2020-05-06 | Stop reason: HOSPADM

## 2020-05-06 RX ORDER — FENTANYL CITRATE 50 UG/ML
25 INJECTION, SOLUTION INTRAMUSCULAR; INTRAVENOUS EVERY 5 MIN PRN
Status: DISCONTINUED | OUTPATIENT
Start: 2020-05-06 | End: 2020-05-06 | Stop reason: HOSPADM

## 2020-05-06 RX ORDER — CEFAZOLIN SODIUM 2 G/50ML
2 SOLUTION INTRAVENOUS ONCE
Status: COMPLETED | OUTPATIENT
Start: 2020-05-06 | End: 2020-05-06

## 2020-05-06 RX ORDER — PROPOFOL 10 MG/ML
INJECTION, EMULSION INTRAVENOUS PRN
Status: DISCONTINUED | OUTPATIENT
Start: 2020-05-06 | End: 2020-05-06 | Stop reason: SDUPTHER

## 2020-05-06 RX ORDER — SODIUM CHLORIDE, SODIUM LACTATE, POTASSIUM CHLORIDE, CALCIUM CHLORIDE 600; 310; 30; 20 MG/100ML; MG/100ML; MG/100ML; MG/100ML
INJECTION, SOLUTION INTRAVENOUS CONTINUOUS
Status: DISCONTINUED | OUTPATIENT
Start: 2020-05-06 | End: 2020-05-06 | Stop reason: HOSPADM

## 2020-05-06 RX ADMIN — FENTANYL CITRATE 100 MCG: 50 INJECTION INTRAMUSCULAR; INTRAVENOUS at 14:07

## 2020-05-06 RX ADMIN — ROPIVACAINE HYDROCHLORIDE 35 ML: 5 INJECTION, SOLUTION EPIDURAL; INFILTRATION; PERINEURAL at 14:10

## 2020-05-06 RX ADMIN — LIDOCAINE HYDROCHLORIDE 100 MG: 20 INJECTION, SOLUTION INTRAVENOUS at 15:10

## 2020-05-06 RX ADMIN — MIDAZOLAM HYDROCHLORIDE 2 MG: 2 INJECTION, SOLUTION INTRAMUSCULAR; INTRAVENOUS at 14:07

## 2020-05-06 RX ADMIN — GLYCOPYRROLATE 0.2 MG: 0.2 INJECTION INTRAMUSCULAR; INTRAVENOUS at 15:24

## 2020-05-06 RX ADMIN — CEFAZOLIN SODIUM 2 G: 2 SOLUTION INTRAVENOUS at 15:20

## 2020-05-06 RX ADMIN — PHENYLEPHRINE HYDROCHLORIDE 100 MCG: 10 INJECTION, SOLUTION INTRAMUSCULAR; INTRAVENOUS; SUBCUTANEOUS at 15:24

## 2020-05-06 RX ADMIN — PHENYLEPHRINE HYDROCHLORIDE 100 MCG: 10 INJECTION, SOLUTION INTRAMUSCULAR; INTRAVENOUS; SUBCUTANEOUS at 16:48

## 2020-05-06 RX ADMIN — SODIUM CHLORIDE, SODIUM LACTATE, POTASSIUM CHLORIDE, AND CALCIUM CHLORIDE: 600; 310; 30; 20 INJECTION, SOLUTION INTRAVENOUS at 16:24

## 2020-05-06 RX ADMIN — ONDANSETRON 4 MG: 2 INJECTION INTRAMUSCULAR; INTRAVENOUS at 15:15

## 2020-05-06 RX ADMIN — PROPOFOL 50 MG: 10 INJECTION, EMULSION INTRAVENOUS at 16:57

## 2020-05-06 RX ADMIN — SODIUM CHLORIDE, SODIUM LACTATE, POTASSIUM CHLORIDE, AND CALCIUM CHLORIDE: 600; 310; 30; 20 INJECTION, SOLUTION INTRAVENOUS at 15:05

## 2020-05-06 RX ADMIN — PROPOFOL 50 MG: 10 INJECTION, EMULSION INTRAVENOUS at 17:10

## 2020-05-06 RX ADMIN — PROPOFOL 200 MG: 10 INJECTION, EMULSION INTRAVENOUS at 15:10

## 2020-05-06 RX ADMIN — ROCURONIUM BROMIDE 100 MG: 10 INJECTION, SOLUTION INTRAVENOUS at 15:10

## 2020-05-06 RX ADMIN — PHENYLEPHRINE HYDROCHLORIDE 50 MCG: 10 INJECTION, SOLUTION INTRAMUSCULAR; INTRAVENOUS; SUBCUTANEOUS at 15:38

## 2020-05-06 RX ADMIN — PHENYLEPHRINE HYDROCHLORIDE 50 MCG: 10 INJECTION, SOLUTION INTRAMUSCULAR; INTRAVENOUS; SUBCUTANEOUS at 15:33

## 2020-05-06 RX ADMIN — ESMOLOL HYDROCHLORIDE 40 MG: 10 INJECTION, SOLUTION INTRAVENOUS at 15:11

## 2020-05-06 RX ADMIN — SODIUM CHLORIDE, POTASSIUM CHLORIDE, SODIUM LACTATE AND CALCIUM CHLORIDE: 600; 310; 30; 20 INJECTION, SOLUTION INTRAVENOUS at 13:30

## 2020-05-06 RX ADMIN — SUGAMMADEX 200 MG: 100 INJECTION, SOLUTION INTRAVENOUS at 17:01

## 2020-05-06 RX ADMIN — PHENYLEPHRINE HYDROCHLORIDE 100 MCG: 10 INJECTION, SOLUTION INTRAMUSCULAR; INTRAVENOUS; SUBCUTANEOUS at 16:01

## 2020-05-06 ASSESSMENT — PULMONARY FUNCTION TESTS
PIF_VALUE: 1
PIF_VALUE: 23
PIF_VALUE: 22
PIF_VALUE: 20
PIF_VALUE: 22
PIF_VALUE: 18
PIF_VALUE: 22
PIF_VALUE: 20
PIF_VALUE: 22
PIF_VALUE: 2
PIF_VALUE: 3
PIF_VALUE: 23
PIF_VALUE: 22
PIF_VALUE: 22
PIF_VALUE: 23
PIF_VALUE: 22
PIF_VALUE: 22
PIF_VALUE: 1
PIF_VALUE: 22
PIF_VALUE: 21
PIF_VALUE: 0
PIF_VALUE: 22
PIF_VALUE: 16
PIF_VALUE: 22
PIF_VALUE: 22
PIF_VALUE: 21
PIF_VALUE: 22
PIF_VALUE: 2
PIF_VALUE: 22
PIF_VALUE: 20
PIF_VALUE: 22
PIF_VALUE: 2
PIF_VALUE: 6
PIF_VALUE: 22
PIF_VALUE: 22
PIF_VALUE: 1
PIF_VALUE: 22
PIF_VALUE: 3
PIF_VALUE: 23
PIF_VALUE: 3
PIF_VALUE: 22
PIF_VALUE: 23
PIF_VALUE: 0
PIF_VALUE: 22
PIF_VALUE: 20
PIF_VALUE: 2
PIF_VALUE: 22
PIF_VALUE: 3
PIF_VALUE: 22
PIF_VALUE: 1
PIF_VALUE: 22
PIF_VALUE: 2
PIF_VALUE: 13
PIF_VALUE: 22
PIF_VALUE: 23
PIF_VALUE: 22
PIF_VALUE: 22
PIF_VALUE: 2
PIF_VALUE: 22
PIF_VALUE: 2
PIF_VALUE: 2
PIF_VALUE: 3
PIF_VALUE: 2
PIF_VALUE: 22
PIF_VALUE: 21
PIF_VALUE: 8
PIF_VALUE: 22
PIF_VALUE: 22
PIF_VALUE: 0

## 2020-05-06 ASSESSMENT — PAIN - FUNCTIONAL ASSESSMENT: PAIN_FUNCTIONAL_ASSESSMENT: 0-10

## 2020-05-06 NOTE — RESULT ENCOUNTER NOTE
Please contact patient with their testing results: Your test for COVID-19, also known as novel coronavirus, came back negative. No virus was detected from the sample from your nose. Until your symptoms are fully resolved, you may still be contagious. We recommend that you remain isolated for 7 days minimum or 72 hours after your symptoms have completely resolved, whichever is longer. For example, if all symptoms improve after 2 days, you should still remain isolated for 7 days. If your symptoms get better after 10 days, you should remain isolated for 13 days. Continually monitor symptoms. Contact a medical provider if symptoms are worsening. If you have any additional questions, contact your doctor.     For additional information, please visit the Centers for Disease Control and Prevention ([place] [email or web address]

## 2020-05-06 NOTE — ANESTHESIA PRE PROCEDURE
Department of Anesthesiology  Preprocedure Note       Name:  Joshua Mcguire   Age:  45 y.o.  :  1980                                          MRN:  [id number]         Date:  2020      Surgeon: Zuleika Mcguire): Mikaela Powell MD    Procedure: RIGHT SHOULDER SCOPE/ ROTATOR CUFF REPAIR/ BICEPS TENODESIS/ LABRAL DEBRIDEMENT/ SUBACROMIAL DECOMPRESSION (Right )    Medications prior to admission:   Prior to Admission medications    Medication Sig Start Date End Date Taking? Authorizing Provider   DUPIXENT 300 MG/2ML SOSY injection Inject 300 mLs as directed every 14 days  19  Yes Historical Provider, MD   fenofibrate (TRIGLIDE) 160 MG tablet TAKE 1 TABLET BY MOUTH ONE TIME A DAY 20   Tamera Morales MD   Crisaborole (EUCRISA) 2 % OINT Apply twice daily to affected area(s) for facial eczema.  10/3/18   Abigail Novak, APRN - CNP       Current medications:    Current Facility-Administered Medications   Medication Dose Route Frequency Provider Last Rate Last Dose    ceFAZolin (ANCEF) 2 g in dextrose 3 % 50 mL IVPB (duplex)  2 g Intravenous Once Mikaela Powell MD        sodium chloride flush 0.9 % injection 10 mL  10 mL Intravenous 2 times per day Justen Schillings, DO        sodium chloride flush 0.9 % injection 10 mL  10 mL Intravenous PRN Justen Schillings, DO        0.9 % sodium chloride infusion   Intravenous Continuous Justen Schillings, DO        lactated ringers infusion   Intravenous Continuous Justen Schillings, DO           Allergies:  No Known Allergies    Problem List:    Patient Active Problem List   Diagnosis Code    Mixed hyperlipidemia E78.2    Allergic rhinitis J30.9    Hypertriglyceridemia E78.1    Intrinsic atopic dermatitis L20.84    Traumatic incomplete tear of right rotator cuff S46.011A       Past Medical History:        Diagnosis Date    Eczema     Mixed hyperlipidemia 2011    Seasonal allergic rhinitis     ait in past    Traumatic incomplete tear of right rotator cuff 2020

## 2020-05-06 NOTE — H&P
Asael Sue    [id number]    [facility] Same Day Surgery Update H & P  Department of General Surgery   Surgical Service   Pre-operative History and Physical  Last H & P within the last 30 days. DIAGNOSIS:   ROTATOR CUFF TEAR    PROCEDURE:  SD SHLDR ARTHROSCOP,SURG,W/ROTAT CUFF REPR [[id number]] (RIGHT SHOULDER SCOPE/ ROTATOR CUFF REPAIR/ BICEPS TENODESIS/ LABRAL DEBRIDEMENT/ SUBACROMIAL DECOMPRESSION)     HISTORY OF PRESENT ILLNESS:   Patient with right shoulder pain, weakness and limited ROM in the setting of MRI demonstrated high grade partial rotator cuff tear and proximal biceps tendon/labrum pathology. The symptoms have been recalcitrant to conservative treatment and the patient presents today for the above procedure.      Past Medical History:        Diagnosis Date    Eczema     Mixed hyperlipidemia 12/9/2011    Seasonal allergic rhinitis     ait in past    Traumatic incomplete tear of right rotator cuff 5/1/2020     Past Surgical History:        Procedure Laterality Date    DENTAL SURGERY      wisdom teeth    HAND SURGERY  2008    bb removed left hand    LASIK  2008    ou    VASECTOMY  02/2017     Past Social History:  Social History     Socioeconomic History    Marital status:      Spouse name: None    Number of children: None    Years of education: None    Highest education level: None   Occupational History    None   Social Needs    Financial resource strain: None    Food insecurity     Worry: None     Inability: None    Transportation needs     Medical: None     Non-medical: None   Tobacco Use    Smoking status: Never Smoker    Smokeless tobacco: Former User   Substance and Sexual Activity    Alcohol use: Yes     Comment: occasional - 2-3 drinks weekly    Drug use: No    Sexual activity: Yes     Partners: Female   Lifestyle    Physical activity     Days per week: None     Minutes per session: None    Stress: None   Relationships    Social connections     Talks on

## 2020-05-06 NOTE — ANESTHESIA PROCEDURE NOTES
Peripheral Block    Patient location during procedure: pre-op  Staffing  Anesthesiologist: Elaine Amos DO  Preanesthetic Checklist  Completed: patient identified, site marked, surgical consent, pre-op evaluation, timeout performed, IV checked, risks and benefits discussed, monitors and equipment checked, anesthesia consent given, oxygen available and patient being monitored  Peripheral Block  Patient position: supine  Prep: ChloraPrep  Patient monitoring: cardiac monitor, continuous pulse ox, continuous capnometry, frequent blood pressure checks and IV access  Block type: Brachial plexus  Laterality: right  Injection technique: single-shot  Procedures: ultrasound guided  Interscalene  Provider prep: mask and sterile gloves  Needle  Needle gauge: 22 G  Needle localization: anatomical landmarks and ultrasound guidance  Assessment  Injection assessment: negative aspiration for heme, no paresthesia on injection and local visualized surrounding nerve on ultrasound  Paresthesia pain: none  Slow fractionated injection: yes  Hemodynamics: stable  Additional Notes  Sterile prep. 2 mg versed + 100 micrograms fentanyl administered. 35 mL 0.5% Ropivacaine injected around the brachial plexus in the interscalene groove. Tip of needle in view at all times. No pain or paresthesias on injection. Pt tolerated the procedure well.    Reason for block: post-op pain management and at surgeon's request

## 2020-05-06 NOTE — PROGRESS NOTES
Ambulatory Surgery/Procedure Discharge Note    Vitals:    05/06/20 1745   BP: 114/76   Pulse: 77   Resp: 13   Temp: 97.3   SpO2: 94%       In: 1200 [I.V.:1200]  Out: 50     Restroom use offered before discharge. Yes    Pain assessment:  present - adequately treated  Pain Level: 0        Patient discharged to home/self care.  Patient discharged via wheel chair by transporter to waiting family/S.O.       5/6/2020 6:11 PM

## 2020-05-07 NOTE — BRIEF OP NOTE
Brief Postoperative Note      Patient: Yas Rice  YOB: 1980  MRN: [id number]    Date of Procedure: 5/6/2020    Pre-Op Diagnosis: ROTATOR CUFF TEAR    Post-Op Diagnosis: Same       Procedure(s):  RIGHT SHOULDER SCOPE/ ROTATOR CUFF REPAIR/ SUBPECTORIAL BICEPS TENODESIS/ SUBACROMIAL DECOMPRESSION    Surgeon(s): MD Isabella Ramirez MD    Assistant:  Surgical Assistant: Belita Hoot Holter; Elicia Standard    Anesthesia: General    Estimated Blood Loss (mL): Minimal    Complications: None    Specimens:   * No specimens in log *    Implants:  * No implants in log *      Drains: * No LDAs found *    Findings: Long head of biceps tendinopathy, high grade partial thickness tear of the supraspinatous, articular sided.      Electronically signed by Isabella Powers MD on 5/7/2020 at 7:30 AM

## 2020-05-07 NOTE — OP NOTE
[address]                [address]                                OPERATIVE REPORT    PATIENT NAME: David Hawk                :        1980  MED REC NO:   [id number]                          ROOM:  ACCOUNT NO:   [de-identified]                           ADMIT DATE: 2020  PROVIDER:     Cari Borja MD    DATE OF PROCEDURE:  2020    OPERATIONS PERFORMED:  Right shoulder arthroscopy with labral  debridement, open subpectoral biceps tenodesis, arthroscopic subacromial  decompression with rotator cuff repair. SURGEON:  Cari Borja MD    ASSISTANT:  Kathy Jacques MD    ANESTHESIA:  General.    PREOPERATIVE DIAGNOSES:  Rotator cuff tear, biceps tendinopathy with  SLAP tear and subacromial impingement. POSTOPERATIVE DIAGNOSES:  Rotator cuff tear, biceps tendinopathy with  SLAP tear and subacromial impingement. PREPARATION:  ChloraPrep. INDICATION FOR THE PROCEDURE:  This is a 71-year-old man, right shoulder  pain, refractory to physical therapy, anti-inflammatory medication,  demonstrates evidence of a rotator cuff tear with a SLAP lesion and  biceps tendinopathy and subacromial impingement, presents for  arthroscopic evaluation and treatment. Risks and benefits of surgery as  well as nonsurgical alternatives were discussed with the patient, who  understood and consented to the operation. OPERATIVE PROCEDURE:  I saw the patient in the holding area and he  confirmed that the right shoulder was the operative extremity. He was  given interscalene block. He was taken to the operating room and after  induction of general anesthesia, he was placed in beach chair position. Right upper extremity was prepped and draped in a sterile fashion. Time-out was performed. OR team agreed the right shoulder was the  operative site. Initials were verified. A posterior portal was  established.   Scope was were correct at the conclusion of the procedure.   Blood loss was  minimal.        Johana Avalos MD    D: 05/07/2020 7:44:02       T: 05/07/2020 10:08:29     [id number]  Job#: [id number]     Doc#: [id number]    CC:

## 2020-05-12 ENCOUNTER — OFFICE VISIT (OUTPATIENT)
Dept: ORTHOPEDIC SURGERY | Age: 40
End: 2020-05-12

## 2020-05-12 ENCOUNTER — HOSPITAL ENCOUNTER (OUTPATIENT)
Dept: PHYSICAL THERAPY | Age: 40
Setting detail: THERAPIES SERIES
Discharge: HOME | End: 2020-05-12
Payer: COMMERCIAL

## 2020-05-12 VITALS — BODY MASS INDEX: 30.99 KG/M2 | WEIGHT: 186 LBS | TEMPERATURE: 98.1 F | HEIGHT: 65 IN

## 2020-05-12 PROCEDURE — 97161 PT EVAL LOW COMPLEX 20 MIN: CPT | Performed by: PHYSICAL THERAPIST

## 2020-05-12 PROCEDURE — 99024 POSTOP FOLLOW-UP VISIT: CPT | Performed by: ORTHOPAEDIC SURGERY

## 2020-05-12 PROCEDURE — 97110 THERAPEUTIC EXERCISES: CPT | Performed by: PHYSICAL THERAPIST

## 2020-05-12 NOTE — PLAN OF CARE
summer 2019. Pain next day. MRI a few months later later. 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, observing in manufacturing area, computer work     Living Status/Prior Level of Function: Independent with ADLs and IADLs, running, throw ball with young kids, golfing, bowling    OBJECTIVE: R handed    ROM PROM AROM  Comment    L R L R    Flexion  ~90 table slide      Abduction  ~75 table slide      ER        IR        Other  (cervical)        Other             Strength not performed due to recent surgery L R Comment   Flexion      Abduction      ER      IR      Supraspinatus      Upper Trap      Lower Trap      Mid Trap      Rhomboids      Biceps      Triceps      Horizontal Abduction      Horizontal Adduction      Lats            Reflexes/Sensation:               [x]Dermatomes/Myotomes intact               []Reflexes equal and normal bilaterally               []Other:     Joint mobility:               []Normal               [x]Hypo due to recent surgery but not formally assessed              []Hyper     Palpation: general TTP due to recent surgery     Functional Mobility/Transfers: modified I with ultra sling R UE     Posture: slouched  With ultra sling, reviewed proper  Fit for sling. Bandages/Dressings/Incisions: CDI with glue      Gait: (include devices/WB status): modified I with ultrasling                          [x] Patient history, allergies, meds reviewed. Medical chart reviewed. See intake form. Review Of Systems (ROS):  [x]Performed Review of systems (Integumentary, CardioPulmonary, Neurological) by intake and observation. Intake form has been scanned into medical record.  Patient has been instructed to contact their primary care physician regarding ROS issues if not already being addressed at this time. Co-morbidities/Complexities (which will affect course of rehabilitation):   [x]None              Arthritic conditions   []Rheumatoid arthritis (M05.9)  []Osteoarthritis (M19.91)    Cardiovascular conditions   []Hypertension (I10)  []Hyperlipidemia (E78.5)  []Angina pectoris (I20)  []Atherosclerosis (I70)    Musculoskeletal conditions   []Disc pathology   []Congenital spine pathologies   []Prior surgical intervention  []Osteoporosis (M81.8)  []Osteopenia (M85.8)   Endocrine conditions   []Hypothyroid (E03.9)  []Hyperthyroid Gastrointestinal conditions   []Constipation (I36.76)    Metabolic conditions   []Morbid obesity (E66.01)  []Diabetes type 1(E10.65) or 2 (E11.65)   []Neuropathy (G60.9)      Pulmonary conditions   []Asthma (J45)  []Coughing   []COPD (J44.9)    Psychological Disorders  []Anxiety (F41.9)  []Depression (F32.9)   []Other:    []Other:            Barriers to/and or personal factors that will affect rehab potential:              [x]Age  [x]Sex              [x]Motivation/Lack of Motivation                        []Co-Morbidities              []Cognitive Function, education/learning barriers              []Environmental, home barriers              []profession/work barriers  []past PT/medical experience  []other:       Falls Risk Assessment (30 days):   [x] Falls Risk assessed and no intervention required.   [] Falls Risk assessed and Patient requires intervention due to being higher risk   TUG score (>12s at risk):     [] Falls education provided, including                ASSESSMENT:   Functional Impairments              []Noted spinal or UE joint hypomobility              []Noted spinal or UE joint hypermobility              [x]Decreased UE functional ROM              [x]Decreased UE functional strength              []Abnormal reflexes/sensation/myotomal/dermatomal functioning as indicated by patients Functional Deficits. [] Progressing: [] Met: [] Not Met: [] Adjusted  3. Patient will demonstrate an increase in strength to good scapular and core control  for UE to allow for proper functional mobility as indicated by patients Functional Deficits. [] Progressing: [] Met: [] Not Met: [] Adjusted  4. Patient will return to light adls personal hygiene, dressing, small meal prep functional activities without increased symptoms or restriction. [] Progressing: [] Met: [] Not Met: [] Adjusted  5. Patient will return to heavy adls yardwork, lifting greater 20+# functional activities without increased symptoms or restriction  [] Progressing: [] Met: [] Not Met: [] Adjusted     Electronically signed by:  Krystal Ricks PT    Note: If patient does not return for scheduled/ recommended follow up visits, this note will serve as a discharge from care along with most recent update on progress.

## 2020-05-12 NOTE — PROGRESS NOTES
reviewed and is included in the medical record. It is available under the media tab. I personally performed the services described in this documentation and scribed by ROXY Salmeron MD  Sports Medicine, Arthroscopic Knee and Shoulder Surgery    This dictation was performed with a verbal recognition program [facility]) and it was checked for errors. It is possible that there are still dictated errors within this office note. If so, please bring any errors to my attention for an addendum. All efforts were made to ensure that this office note is accurate.

## 2020-05-12 NOTE — FLOWSHEET NOTE
[]? Adjusted     Long Term Goals: To be achieved in: 12 weeks  1. Disability index score of 20% or less for the UEFS to assist with reaching prior level of function. []? Progressing: []? Met: []? Not Met: []? Adjusted  2. Patient will demonstrate increased AROM to WNL to allow for proper joint functioning as indicated by patients Functional Deficits. []? Progressing: []? Met: []? Not Met: []? Adjusted  3. Patient will demonstrate an increase in strength to good scapular and core control  for UE to allow for proper functional mobility as indicated by patients Functional Deficits. []? Progressing: []? Met: []? Not Met: []? Adjusted  4. Patient will return to light adls personal hygiene, dressing, small meal prep functional activities without increased symptoms or restriction. []? Progressing: []? Met: []? Not Met: []? Adjusted  5. Patient will return to heavy adls yardwork, lifting greater 20+# functional activities without increased symptoms or restriction  []? Progressing: []? Met: []? Not Met: []? Adjusted        Overall Progression Towards Functional goals/ Treatment Progress Update:  [] Patient is progressing as expected towards functional goals listed. [] Progression is slowed due to complexities/Impairments listed. [] Progression has been slowed due to co-morbidities. [x] Plan just implemented, too soon to assess goals progression <30days   [] Goals require adjustment due to lack of progress  [] Patient is not progressing as expected and requires additional follow up with physician  [] Other    Prognosis for POC: [x] Good [] Fair  [] Poor      Patient requires continued skilled intervention: [x] Yes  [] No    Treatment/Activity Tolerance:  [x] Patient able to complete treatment  [] Patient limited by fatigue  [] Patient limited by pain     [] Patient limited by other medical complications  [] Other:                   Patient Education:   Reviewed diagnosis, POC, HEP and its importance. HEP instruction:    Access Code: [email or web address]   URL: [email or web address]   Date: 05/12/2020   Prepared by: Radha Mendiola     Exercises   · Seated Shoulder Shrugs - 10 reps - 3 sets - 1 - 2 hold - 3x daily - 7x weekly   · Seated Scapular Retraction - 10 reps - 3 sets - 1-2 hold - 3x daily - 7x weekly   · Supported Elbow Flexion Extension PROM - 10 reps - 1 sets - 10 hold - 3x daily - 7x weekly   · Putty Squeezes - 10 reps - 1 sets - 10 hold - 3x daily - 7x weekly   · Seated Cervical Sidebending Stretch - 5 reps - 3 sets - 30 hold - 3x daily - 7x weekly   · Seated Shoulder Flexion Towel Slide at Table Top - 10 reps - 1 sets - 10 hold - 3x daily - 7x weekly   Seated Shoulder Abduction Towel Slide at Table Top - 10 reps - 1 sets - 10 hold - 3x daily - 7x weekly      PLAN: See eval 5/12/2020  [] Continue per plan of care [] Alter current plan (see comments above)  [x] Plan of care initiated [] Hold pending MD visit [] Discharge      Electronically signed by:  Radha Mendiola PT    Note: If patient does not return for scheduled/ recommended follow up visits, this note will serve as a discharge from care along with most recent update on progress.

## 2020-05-19 ENCOUNTER — HOSPITAL ENCOUNTER (OUTPATIENT)
Dept: PHYSICAL THERAPY | Age: 40
Setting detail: THERAPIES SERIES
Discharge: HOME | End: 2020-05-19
Payer: COMMERCIAL

## 2020-05-19 PROCEDURE — 97110 THERAPEUTIC EXERCISES: CPT | Performed by: PHYSICAL THERAPIST

## 2020-05-19 PROCEDURE — 97112 NEUROMUSCULAR REEDUCATION: CPT | Performed by: PHYSICAL THERAPIST

## 2020-05-19 NOTE — FLOWSHEET NOTE
The [facility] and [facility], [address] [place] [address] [address] [facility], [address] [place] [address]  Phone: [phone]   Fax:     [phone]    Physical Therapy Daily Treatment Note  Date:  2020    Patient Name:  Leticia Teague    :  1980  MRN: [id number]  Restrictions/Precautions:    Medical/Treatment Diagnosis Information:  Diagnosis: R rotator cuff tear  Treatment Diagnosis: R shoulder pain       DATE OF PROCEDURE:  2020     OPERATIONS PERFORMED:  Right shoulder arthroscopy with labral  debridement, open subpectoral biceps tenodesis, arthroscopic subacromial  decompression with rotator cuff repair.     SURGEON: Tanja Stanley MD     ASSISTANT: Adela Mcguire MD     ANESTHESIA:  General.     PREOPERATIVE DIAGNOSES:  Rotator cuff tear, biceps tendinopathy with  SLAP tear and subacromial impingement.     POSTOPERATIVE DIAGNOSES:  Rotator cuff tear, biceps tendinopathy with  SLAP tear and subacromial impingement. Insurance/Certification information:  PT Insurance Information: R UNL   Physician Information:  Referring Practitioner: Tanja Stanley  Has the plan of care been signed (Y/N):        []  Yes  [x]  No     Date of Patient follow up with Physician: per MD      Is this a Progress Report:     []  Yes  [x]  No        If Yes:  Date Range for reporting period:  Beginning2020  Ending    Progress report will be due (10 Rx or 30 days whichever is less): 7002      Recertification will be due (POC Duration  / 90 days whichever is less): 2020        Visit # Insurance Allowable Auth Required   2 unl  []  Yes [x]  No        Functional Scale: UEFS 90%   Date assessed:  2020     Latex Allergy:  [x]NO      []YES  Preferred Language for Healthcare:   [x]English       []other:    Pain level:  2-9/10 2020     SUBJECTIVE:   Reports that his shoulder is doing well. States that his pain is well managed.   Compliant with sling wear and precautions. States that he has been compliant with HEP and this is going well. OBJECTIVE:     ROM PROM AROM  Comment     L R L R     Flexion   ~100 table slides         Abduction   ~90 table slide         ER             IR             Other  (cervical)             Other                    Strength not performed due to recent surgery L R Comment   Flexion         Abduction         ER         IR         Supraspinatus         Upper Trap         Lower Trap         Mid Trap         Rhomboids         Biceps         Triceps         Horizontal Abduction         Horizontal Adduction         Lats                  Reflexes/Sensation:               [x]? Dermatomes/Myotomes intact               []? Reflexes equal and normal bilaterally               []? Other:     Joint mobility:               []? Normal               [x]? Hypo due to recent surgery but not formally assessed              []? Hyper     Palpation: general TTP due to recent surgery     Functional Mobility/Transfers: modified I with ultra sling R UE     Posture: slouched  With ultra sling, reviewed proper  Fit for sling.     Bandages/Dressings/Incisions: healing well with (-) signs of infection 5/19      Gait: (include devices/WB status): modified I with ultrasling      RESTRICTIONS/PRECAUTIONS: RCR, bicep tenodesis protocols    Exercises/Interventions:   Exercises:  Exercise/Equipment Resistance/Repetitions Other comments   Stretching/PROM     Wand     Table Slides Fl [id number]  abd [id number]    Elbow PROM [id number]    Pulleys     Pendulum          Isometrics     Retraction x30     3'    shrugs x30    Flexion     Abduction     External Rotation     Internal Rotation     Biceps     Triceps          PRE's     Flexion     Abduction     External Rotation     Internal Rotation     Shrugs     EXT     Reverse Flys     Serratus     Horizontal Abd with ER     Biceps     Triceps     Retraction          Cable Column/Theraband     External Rotation Internal Rotation     Shrugs     Lats     Ext     Flex     Scapular Retraction     BIC     TRIC     PNF          Dynamic Stability          Plyoback          Manual interventions                     Therapeutic Exercise and NMR EXR  [x] (39558) Provided verbal/tactile cueing for activities related to strengthening, flexibility, endurance, ROM  for improvements in scapular, scapulothoracic and UE control with self care, reaching, carrying, lifting, house/yardwork, driving/computer work.    [] (82619) Provided verbal/tactile cueing for activities related to improving balance, coordination, kinesthetic sense, posture, motor skill, proprioception  to assist with  scapular, scapulothoracic and UE control with self care, reaching, carrying, lifting, house/yardwork, driving/computer work. Therapeutic Activities:    [] (99633 or 42849) Provided verbal/tactile cueing for activities related to improving balance, coordination, kinesthetic sense, posture, motor skill, proprioception and motor activation to allow for proper function of scapular, scapulothoracic and UE control with self care, carrying, lifting, driving/computer work.      Home Exercise Program:    [x] (38664) Reviewed/Progressed HEP activities related to strengthening, flexibility, endurance, ROM of scapular, scapulothoracic and UE control with self care, reaching, carrying, lifting, house/yardwork, driving/computer work  [] (82756) Reviewed/Progressed HEP activities related to improving balance, coordination, kinesthetic sense, posture, motor skill, proprioception of scapular, scapulothoracic and UE control with self care, reaching, carrying, lifting, house/yardwork, driving/computer work      Manual Treatments:  PROM / STM / Oscillations-Mobs:  G-I, II, III, IV (PA's, Inf., Post.)  [] (06224) Provided manual therapy to mobilize soft tissue/joints of cervical/CT, scapular GHJ and UE for the purpose of modulating pain, promoting relaxation,  increasing ROM, reducing/eliminating soft tissue swelling/inflammation/restriction, improving soft tissue extensibility and allowing for proper ROM for normal function with self care, reaching, carrying, lifting, house/yardwork, driving/computer work    Modalities:  Ice HEP on 20off 40 thru out day    Charges:  Timed Code Treatment Minutes: 30'   Total Treatment Minutes: 11:05-11:35  30'       [] EVAL (LOW) 93045 (typically 20 minutes face-to-face)  [] EVAL (MOD) 38802 (typically 30 minutes face-to-face)  [] EVAL (HIGH) 64425 (typically 45 minutes face-to-face)  [] RE-EVAL     [x] WH(54893) x  1   [] IONTO  [x] NMR (47881) x 1    [] VASO  [] Manual (59849) x      [] Other:  [] TA x      [] Mech Traction (57197)  [] ES(attended) (50357)      [] ES (un) (36073):     GOALS:  Patient stated goal: return to running,golf, playing with young kids    []? Progressing: []? Met: []? Not Met: []? Adjusted     Therapist goals for Patient:   Short Term Goals: To be achieved in: 2 -4 weeks  1. Independent in HEP and progression per patient tolerance, in order to prevent re-injury. []? Progressing: []? Met: []? Not Met: []? Adjusted   2. Patient will have a decrease in pain to facilitate improvement in movement, function, and ADLs as indicated by Functional Deficits. []? Progressing: []? Met: []? Not Met: []? Adjusted     Long Term Goals: To be achieved in: 12 weeks  1. Disability index score of 20% or less for the UEFS to assist with reaching prior level of function. []? Progressing: []? Met: []? Not Met: []? Adjusted  2. Patient will demonstrate increased AROM to WNL to allow for proper joint functioning as indicated by patients Functional Deficits. []? Progressing: []? Met: []? Not Met: []? Adjusted  3. Patient will demonstrate an increase in strength to good scapular and core control  for UE to allow for proper functional mobility as indicated by patients Functional Deficits. []? Progressing: []? Met: []?  Not Met: []?

## 2020-05-21 ENCOUNTER — APPOINTMENT (OUTPATIENT)
Dept: PHYSICAL THERAPY | Age: 40
End: 2020-05-21
Payer: COMMERCIAL

## 2020-05-28 ENCOUNTER — HOSPITAL ENCOUNTER (OUTPATIENT)
Dept: PHYSICAL THERAPY | Age: 40
Setting detail: THERAPIES SERIES
Discharge: HOME | End: 2020-05-28
Payer: COMMERCIAL

## 2020-05-28 PROCEDURE — 97140 MANUAL THERAPY 1/> REGIONS: CPT | Performed by: PHYSICAL THERAPIST

## 2020-05-28 PROCEDURE — 97112 NEUROMUSCULAR REEDUCATION: CPT | Performed by: PHYSICAL THERAPIST

## 2020-05-28 PROCEDURE — 97110 THERAPEUTIC EXERCISES: CPT | Performed by: PHYSICAL THERAPIST

## 2020-05-29 NOTE — FLOWSHEET NOTE
L R L R     Flexion   ~100 table slides         Abduction   ~90 table slide         ER             IR             Other  (cervical)             Other                    Strength not performed due to recent surgery L R Comment   Flexion         Abduction         ER         IR         Supraspinatus         Upper Trap         Lower Trap         Mid Trap         Rhomboids         Biceps         Triceps         Horizontal Abduction         Horizontal Adduction         Lats                  Reflexes/Sensation:               [x]? Dermatomes/Myotomes intact               []? Reflexes equal and normal bilaterally               []? Other:     Joint mobility:               []? Normal               [x]? Hypo due to recent surgery but not formally assessed              []? Hyper     Palpation: general TTP due to recent surgery     Functional Mobility/Transfers: modified I with ultra sling R UE     Posture: slouched  With ultra sling, reviewed proper  Fit for sling.     Bandages/Dressings/Incisions: healing well with (-) signs of infection 5/19      Gait: (include devices/WB status): modified I with ultrasling      RESTRICTIONS/PRECAUTIONS: RCR, bicep tenodesis protocols    Exercises/Interventions:   Exercises:  Exercise/Equipment Resistance/Repetitions Other comments   Stretching/PROM     Wand     Table Slides Fl [id number]  abd [id number]    Elbow PROM [id number]    Pulleys     Pendulum     fav 4             Ll/ld gentle bicep  Flex (passive), er (to 5 degrees).  Pendulum (passive)         10' (5' x 2)               Isometrics     Retraction x30     3'    shrugs x30    Flexion     Abduction     External Rotation     Internal Rotation     Biceps     Triceps          PRE's     Flexion     Abduction     External Rotation     Internal Rotation     Shrugs     EXT     Reverse Flys     Serratus     Horizontal Abd with ER     Biceps     Triceps     Retraction          Cable Column/Theraband     External Rotation     Internal Rotation adls personal hygiene, dressing, small meal prep functional activities without increased symptoms or restriction. []? Progressing: []? Met: []? Not Met: []? Adjusted  5. Patient will return to heavy adls yardwork, lifting greater 20+# functional activities without increased symptoms or restriction  []? Progressing: []? Met: []? Not Met: []? Adjusted        Overall Progression Towards Functional goals/ Treatment Progress Update:  [] Patient is progressing as expected towards functional goals listed. [] Progression is slowed due to complexities/Impairments listed. [] Progression has been slowed due to co-morbidities. [x] Plan just implemented, too soon to assess goals progression <30days   [] Goals require adjustment due to lack of progress  [] Patient is not progressing as expected and requires additional follow up with physician  [] Other    Prognosis for POC: [x] Good [] Fair  [] Poor      Patient requires continued skilled intervention: [x] Yes  [] No    Treatment/Activity Tolerance:  [x] Patient able to complete treatment  [] Patient limited by fatigue  [] Patient limited by pain     [] Patient limited by other medical complications  [x] Other: 5/19  No complaints with today's program.  ROM is progressing as expected. Incision site is healing well with (-) signs of infection. Pt demonstrated independence with current program and compliance with surgical restrictions. Patient Education:   Reviewed diagnosis, POC, HEP and its importance. HEP instruction:    Access Code: [email or web address]   URL: [email or web address]   Date: 05/12/2020   Prepared by: Maggie Karimi     Exercises   · Seated Shoulder Shrugs - 10 reps - 3 sets - 1 - 2 hold - 3x daily - 7x weekly   · Seated Scapular Retraction - 10 reps - 3 sets - 1-2 hold - 3x daily - 7x weekly   · Supported Elbow Flexion Extension PROM - 10 reps - 1 sets - 10 hold - 3x daily - 7x weekly   · Putty Squeezes - 10 reps - 1 sets - 10

## 2020-06-04 ENCOUNTER — HOSPITAL ENCOUNTER (OUTPATIENT)
Dept: PHYSICAL THERAPY | Age: 40
Setting detail: THERAPIES SERIES
Discharge: HOME | End: 2020-06-04
Payer: COMMERCIAL

## 2020-06-04 PROCEDURE — 97110 THERAPEUTIC EXERCISES: CPT | Performed by: PHYSICAL THERAPIST

## 2020-06-04 PROCEDURE — 97140 MANUAL THERAPY 1/> REGIONS: CPT | Performed by: PHYSICAL THERAPIST

## 2020-06-09 ENCOUNTER — OFFICE VISIT (OUTPATIENT)
Dept: ORTHOPEDIC SURGERY | Age: 40
End: 2020-06-09

## 2020-06-09 VITALS — BODY MASS INDEX: 30.99 KG/M2 | HEIGHT: 65 IN | WEIGHT: 186 LBS | TEMPERATURE: 98.4 F

## 2020-06-09 PROCEDURE — 99024 POSTOP FOLLOW-UP VISIT: CPT | Performed by: ORTHOPAEDIC SURGERY

## 2020-06-09 NOTE — PROGRESS NOTES
Cholesterol Paternal Grandfather     High Blood Pressure Mother     High Cholesterol Mother     High Cholesterol Brother     Emphysema Maternal Grandmother     High Blood Pressure Maternal Grandmother     High Cholesterol Maternal Grandmother     High Blood Pressure Maternal Grandfather     High Cholesterol Maternal Grandfather     Heart Disease Paternal Grandmother         CHF    High Blood Pressure Paternal Grandmother     High Cholesterol Paternal Grandmother     High Cholesterol Brother        Social History     Socioeconomic History    Marital status:      Spouse name: None    Number of children: None    Years of education: None    Highest education level: None   Occupational History    None   Social Needs    Financial resource strain: None    Food insecurity     Worry: None     Inability: None    Transportation needs     Medical: None     Non-medical: None   Tobacco Use    Smoking status: Never Smoker    Smokeless tobacco: Former User   Substance and Sexual Activity    Alcohol use: Yes     Comment: occasional - 2-3 drinks weekly    Drug use: No    Sexual activity: Yes     Partners: Female   Lifestyle    Physical activity     Days per week: None     Minutes per session: None    Stress: None   Relationships    Social connections     Talks on phone: None     Gets together: None     Attends [employer] service: None     Active member of club or organization: None     Attends meetings of clubs or organizations: None     Relationship status: None    Intimate partner violence     Fear of current or ex partner: None     Emotionally abused: None     Physically abused: None     Forced sexual activity: None   Other Topics Concern    None   Social History Narrative    None       Current Outpatient Medications   Medication Sig Dispense Refill    fenofibrate (TRIGLIDE) 160 MG tablet TAKE 1 TABLET BY MOUTH ONE TIME A DAY 30 tablet 5    DUPIXENT 300 MG/2ML SOSY injection Inject 300 mLs as

## 2020-06-12 ENCOUNTER — HOSPITAL ENCOUNTER (OUTPATIENT)
Dept: PHYSICAL THERAPY | Age: 40
Setting detail: THERAPIES SERIES
Discharge: HOME | End: 2020-06-12
Payer: COMMERCIAL

## 2020-06-12 PROCEDURE — G0283 ELEC STIM OTHER THAN WOUND: HCPCS | Performed by: PHYSICAL THERAPIST

## 2020-06-12 PROCEDURE — 97110 THERAPEUTIC EXERCISES: CPT | Performed by: PHYSICAL THERAPIST

## 2020-06-12 PROCEDURE — 97140 MANUAL THERAPY 1/> REGIONS: CPT | Performed by: PHYSICAL THERAPIST

## 2020-06-15 ENCOUNTER — HOSPITAL ENCOUNTER (OUTPATIENT)
Dept: PHYSICAL THERAPY | Age: 40
Setting detail: THERAPIES SERIES
Discharge: HOME | End: 2020-06-15
Payer: COMMERCIAL

## 2020-06-15 PROCEDURE — 97110 THERAPEUTIC EXERCISES: CPT | Performed by: PHYSICAL THERAPIST

## 2020-06-15 PROCEDURE — 97140 MANUAL THERAPY 1/> REGIONS: CPT | Performed by: PHYSICAL THERAPIST

## 2020-06-16 NOTE — FLOWSHEET NOTE
[address] [facility]  Physical Therapy Daily Treatment Note  Date:  6/15/2020  Patient Name:  Juan Alberto Price    :  1980  MRN: [id number]  Restrictions/Precautions:    Medical/Treatment Diagnosis Information:  Diagnosis: R rotator cuff tear  Treatment Diagnosis: R shoulder pain       DATE OF PROCEDURE:  2020     OPERATIONS PERFORMED:  Right shoulder arthroscopy with labral  debridement, open subpectoral biceps tenodesis, arthroscopic subacromial  decompression with rotator cuff repair.     SURGEON: Mike Cesar MD     ASSISTANT: Belgica Cadena MD     ANESTHESIA:  General.     PREOPERATIVE DIAGNOSES:  Rotator cuff tear, biceps tendinopathy with  SLAP tear and subacromial impingement.     POSTOPERATIVE DIAGNOSES:  Rotator cuff tear, biceps tendinopathy with  SLAP tear and subacromial impingement.               Insurance/Certification information:  PT Insurance Information: [facility]   Physician Information:  Referring Practitioner: Mike Cesar  Has the plan of care been signed (Y/N):        []  Yes  [x]  No     Date of Patient follow up with Physician: per MD      Is this a Progress Report:     []  Yes  [x]  No        If Yes:  Date Range for reporting period:  Beginning2020  Ending    Progress report will be due (10 Rx or 30 days whichever is less):       Recertification will be due (POC Duration  / 90 days whichever is less): 2020        Visit # Insurance Allowable Auth Required    []  Yes [x]  No        Functional Scale: UEFS 90%   Date assessed:  2020     Latex Allergy:  [x]NO      []YES  Preferred Language for Healthcare:   [x]English       []other:    Pain level:  2-9/10 2020     SUBJECTIVE:   Reports that he is looking forward to progressing    OBJECTIVE:     ROM PROM AROM  Comment     L R L R     Flexion   ~100 table slides         Abduction   ~90 table slide         ER             IR           Other  (cervical)             Other                    Strength not performed due to recent surgery L R Comment   Flexion         Abduction         ER         IR         Supraspinatus         Upper Trap         Lower Trap         Mid Trap         Rhomboids         Biceps         Triceps         Horizontal Abduction         Horizontal Adduction         Lats                  Reflexes/Sensation:               [x]? Dermatomes/Myotomes intact               []? Reflexes equal and normal bilaterally               []? Other:     Joint mobility:               []? Normal               [x]? Hypo due to recent surgery but not formally assessed              []? Hyper     Palpation: general TTP due to recent surgery     Functional Mobility/Transfers: modified I with ultra sling R UE     Posture: slouched  With ultra sling, reviewed proper  Fit for sling.     Bandages/Dressings/Incisions: healing well with (-) signs of infection 5/19      Gait: (include devices/WB status): modified I with ultrasling      RESTRICTIONS/PRECAUTIONS: RCR, bicep tenodesis protocols    Exercises/Interventions:   Exercises:  Exercise/Equipment Resistance/Repetitions Other comments   Stretching/PROM     Wand     Table Slides Fl [id number]  abd [id number]    Elbow PROM [id number]    Pulleys     Pendulum     fav 4             Ll/ld gentle bicep  Flex (passive), er (to 5 degrees).  Pendulum (passive)         10' (5' x 2)               Isometrics     Retraction x30     3'    shrugs x30    Flexion     Abduction     External Rotation     Internal Rotation     Biceps     Triceps          PRE's     Flexion     Abduction     External Rotation     Internal Rotation     Shrugs     EXT     Reverse Flys     Serratus     Horizontal Abd with ER     Biceps     Triceps     Retraction          Cable Column/Theraband     External Rotation     Internal Rotation     Shrugs     Lats     Ext     Flex     Scapular Retraction     BIC     TRIC     PNF          Dynamic Stability Plyoback          Manual interventions     Prom/stm  25'                Therapeutic Exercise and NMR EXR  [x] (20538) Provided verbal/tactile cueing for activities related to strengthening, flexibility, endurance, ROM  for improvements in scapular, scapulothoracic and UE control with self care, reaching, carrying, lifting, house/yardwork, driving/computer work.    [] (68454) Provided verbal/tactile cueing for activities related to improving balance, coordination, kinesthetic sense, posture, motor skill, proprioception  to assist with  scapular, scapulothoracic and UE control with self care, reaching, carrying, lifting, house/yardwork, driving/computer work. Therapeutic Activities:    [] (94012 or 30600) Provided verbal/tactile cueing for activities related to improving balance, coordination, kinesthetic sense, posture, motor skill, proprioception and motor activation to allow for proper function of scapular, scapulothoracic and UE control with self care, carrying, lifting, driving/computer work.      Home Exercise Program:    [x] (85427) Reviewed/Progressed HEP activities related to strengthening, flexibility, endurance, ROM of scapular, scapulothoracic and UE control with self care, reaching, carrying, lifting, house/yardwork, driving/computer work  [] (82076) Reviewed/Progressed HEP activities related to improving balance, coordination, kinesthetic sense, posture, motor skill, proprioception of scapular, scapulothoracic and UE control with self care, reaching, carrying, lifting, house/yardwork, driving/computer work      Manual Treatments:  PROM / STM / Oscillations-Mobs:  G-I, II, III, IV (PA's, Inf., Post.)  [] (47548) Provided manual therapy to mobilize soft tissue/joints of cervical/CT, scapular GHJ and UE for the purpose of modulating pain, promoting relaxation,  increasing ROM, reducing/eliminating soft tissue swelling/inflammation/restriction, improving soft tissue extensibility and allowing for proper restriction. [x]? Progressing: []? Met: []? Not Met: []? Adjusted  5. Patient will return to heavy adls yardwork, lifting greater 20+# functional activities without increased symptoms or restriction  [x]? Progressing: []? Met: []? Not Met: []? Adjusted        Overall Progression Towards Functional goals/ Treatment Progress Update:  [] Patient is progressing as expected towards functional goals listed. [] Progression is slowed due to complexities/Impairments listed. [] Progression has been slowed due to co-morbidities. [x] Plan just implemented, too soon to assess goals progression <30days   [] Goals require adjustment due to lack of progress  [] Patient is not progressing as expected and requires additional follow up with physician  [] Other    Prognosis for POC: [x] Good [] Fair  [] Poor      Patient requires continued skilled intervention: [x] Yes  [] No    Treatment/Activity Tolerance:  [x] Patient able to complete treatment  [] Patient limited by fatigue  [] Patient limited by pain     [] Patient limited by other medical complications  [x] Other: 5/19  No complaints with today's program.  ROM is progressing as expected. Incision site is healing well with (-) signs of infection. Pt demonstrated independence with current program and compliance with surgical restrictions. Patient Education:   Reviewed diagnosis, POC, HEP and its importance. HEP instruction:    Access Code: [email or web address]   URL: [email or web address]   Date: 05/12/2020   Prepared by: Delisa Mock     Exercises   · Seated Shoulder Shrugs - 10 reps - 3 sets - 1 - 2 hold - 3x daily - 7x weekly   · Seated Scapular Retraction - 10 reps - 3 sets - 1-2 hold - 3x daily - 7x weekly   · Supported Elbow Flexion Extension PROM - 10 reps - 1 sets - 10 hold - 3x daily - 7x weekly   · Putty Squeezes - 10 reps - 1 sets - 10 hold - 3x daily - 7x weekly   · Seated Cervical Sidebending Stretch - 5 reps - 3 sets - 30 hold - 3x

## 2020-06-22 ENCOUNTER — HOSPITAL ENCOUNTER (OUTPATIENT)
Dept: PHYSICAL THERAPY | Age: 40
Setting detail: THERAPIES SERIES
Discharge: HOME | End: 2020-06-22
Payer: COMMERCIAL

## 2020-06-22 PROCEDURE — 97110 THERAPEUTIC EXERCISES: CPT | Performed by: PHYSICAL THERAPIST

## 2020-06-22 PROCEDURE — 97140 MANUAL THERAPY 1/> REGIONS: CPT | Performed by: PHYSICAL THERAPIST

## 2020-06-24 NOTE — FLOWSHEET NOTE
The [address] [place] [address] and Sports [facility]  Physical Therapy Daily Treatment Note  Date:  2020  Patient Name:  Asael Sue    :  1980  MRN: [id number]  Restrictions/Precautions:    Medical/Treatment Diagnosis Information:  Diagnosis: R rotator cuff tear  Treatment Diagnosis: R shoulder pain       DATE OF PROCEDURE:  2020     OPERATIONS PERFORMED:  Right shoulder arthroscopy with labral  debridement, open subpectoral biceps tenodesis, arthroscopic subacromial  decompression with rotator cuff repair.     SURGEON: Willem Rider MD     ASSISTANT: Geneva Mcknight MD     ANESTHESIA:  General.     PREOPERATIVE DIAGNOSES:  Rotator cuff tear, biceps tendinopathy with  SLAP tear and subacromial impingement.     POSTOPERATIVE DIAGNOSES:  Rotator cuff tear, biceps tendinopathy with  SLAP tear and subacromial impingement.               Insurance/Certification information:  PT Insurance Information: [facility]   Physician Information:  Referring Practitioner: Willem Rider  Has the plan of care been signed (Y/N):        []  Yes  [x]  No     Date of Patient follow up with Physician: per MD      Is this a Progress Report:     []  Yes  [x]  No        If Yes:  Date Range for reporting period:  Beginning2020  Ending    Progress report will be due (10 Rx or 30 days whichever is less):       Recertification will be due (POC Duration  / 90 days whichever is less): 2020        Visit # Insurance Allowable Auth Required    []  Yes [x]  No        Functional Scale: UEFS 90%   Date assessed:  2020     Latex Allergy:  [x]NO      []YES  Preferred Language for Healthcare:   [x]English       []other:    Pain level:  2-9/10 2020     SUBJECTIVE:   Reports that he is looking forward to progressing    OBJECTIVE:     ROM PROM AROM  Comment     L R L R     Flexion   ~100 table slides         Abduction   ~90 table slide         ER             IR           PNF          Dynamic Stability          Plyoback          Manual interventions     Prom/stm  25'                Therapeutic Exercise and NMR EXR  [x] (14282) Provided verbal/tactile cueing for activities related to strengthening, flexibility, endurance, ROM  for improvements in scapular, scapulothoracic and UE control with self care, reaching, carrying, lifting, house/yardwork, driving/computer work.    [] (06013) Provided verbal/tactile cueing for activities related to improving balance, coordination, kinesthetic sense, posture, motor skill, proprioception  to assist with  scapular, scapulothoracic and UE control with self care, reaching, carrying, lifting, house/yardwork, driving/computer work. Therapeutic Activities:    [] (93674 or 84179) Provided verbal/tactile cueing for activities related to improving balance, coordination, kinesthetic sense, posture, motor skill, proprioception and motor activation to allow for proper function of scapular, scapulothoracic and UE control with self care, carrying, lifting, driving/computer work.      Home Exercise Program:    [x] (78054) Reviewed/Progressed HEP activities related to strengthening, flexibility, endurance, ROM of scapular, scapulothoracic and UE control with self care, reaching, carrying, lifting, house/yardwork, driving/computer work  [] (19109) Reviewed/Progressed HEP activities related to improving balance, coordination, kinesthetic sense, posture, motor skill, proprioception of scapular, scapulothoracic and UE control with self care, reaching, carrying, lifting, house/yardwork, driving/computer work      Manual Treatments:  PROM / STM / Oscillations-Mobs:  G-I, II, III, IV (PA's, Inf., Post.)  [] (88678) Provided manual therapy to mobilize soft tissue/joints of cervical/CT, scapular GHJ and UE for the purpose of modulating pain, promoting relaxation,  increasing ROM, reducing/eliminating soft tissue swelling/inflammation/restriction, improving soft activities without increased symptoms or restriction. [x]? Progressing: []? Met: []? Not Met: []? Adjusted  5. Patient will return to heavy adls yardwork, lifting greater 20+# functional activities without increased symptoms or restriction  [x]? Progressing: []? Met: []? Not Met: []? Adjusted        Overall Progression Towards Functional goals/ Treatment Progress Update:  [] Patient is progressing as expected towards functional goals listed. [] Progression is slowed due to complexities/Impairments listed. [] Progression has been slowed due to co-morbidities. [x] Plan just implemented, too soon to assess goals progression <30days   [] Goals require adjustment due to lack of progress  [] Patient is not progressing as expected and requires additional follow up with physician  [] Other    Prognosis for POC: [x] Good [] Fair  [] Poor      Patient requires continued skilled intervention: [x] Yes  [] No    Treatment/Activity Tolerance:  [x] Patient able to complete treatment  [] Patient limited by fatigue  [] Patient limited by pain     [] Patient limited by other medical complications  [x] Other: 5/19  No complaints with today's program.  ROM is progressing as expected. Incision site is healing well with (-) signs of infection. Pt demonstrated independence with current program and compliance with surgical restrictions. Patient Education:   Reviewed diagnosis, POC, HEP and its importance. HEP instruction:    Access Code: [email or web address]   URL: [email or web address]   Date: 05/12/2020   Prepared by: Syed Leash     Exercises   · Seated Shoulder Shrugs - 10 reps - 3 sets - 1 - 2 hold - 3x daily - 7x weekly   · Seated Scapular Retraction - 10 reps - 3 sets - 1-2 hold - 3x daily - 7x weekly   · Supported Elbow Flexion Extension PROM - 10 reps - 1 sets - 10 hold - 3x daily - 7x weekly   · Putty Squeezes - 10 reps - 1 sets - 10 hold - 3x daily - 7x weekly   · Seated Cervical Sidebending

## 2020-06-30 ENCOUNTER — HOSPITAL ENCOUNTER (OUTPATIENT)
Dept: PHYSICAL THERAPY | Age: 40
Setting detail: THERAPIES SERIES
Discharge: HOME | End: 2020-06-30

## 2020-06-30 NOTE — FLOWSHEET NOTE
The [address] [place] [address] and Sports Rehabilitation  Physical Therapy Daily Treatment Note  Date:  2020  Patient Name:  Darryle Nickel    :  1980  MRN: [id number]  Restrictions/Precautions:    Medical/Treatment Diagnosis Information:  Diagnosis: R rotator cuff tear  Treatment Diagnosis: R shoulder pain       DATE OF PROCEDURE:  2020     OPERATIONS PERFORMED:  Right shoulder arthroscopy with labral  debridement, open subpectoral biceps tenodesis, arthroscopic subacromial  decompression with rotator cuff repair.     SURGEON: Moy Mcdonald MD     ASSISTANT: Dotty Topete MD     ANESTHESIA:  General.     PREOPERATIVE DIAGNOSES:  Rotator cuff tear, biceps tendinopathy with  SLAP tear and subacromial impingement.     POSTOPERATIVE DIAGNOSES:  Rotator cuff tear, biceps tendinopathy with  SLAP tear and subacromial impingement.               Insurance/Certification information:  PT Insurance Information: [facility]   Physician Information:  Referring Practitioner: Moy Mcdonald  Has the plan of care been signed (Y/N):        []  Yes  [x]  No     Date of Patient follow up with Physician: per MD      Is this a Progress Report:     []  Yes  [x]  No        If Yes:  Date Range for reporting period:  Beginning2020  Ending    Progress report will be due (10 Rx or 30 days whichever is less): 6625      Recertification will be due (POC Duration  / 90 days whichever is less): 2020        Visit # Insurance Allowable Auth Required    unl  []  Yes [x]  No        Functional Scale: UEFS 90%   Date assessed:  2020     Latex Allergy:  [x]NO      []YES  Preferred Language for Healthcare:   [x]English       []other:    Pain level:  2-9/10 2020     SUBJECTIVE:   Reports that he is looking forward to progressing    OBJECTIVE:     ROM PROM AROM  Comment     L R L R     Flexion   ~100 table slides         Abduction   ~90 table slide         ER             IR           Other  (cervical)             Other                    Strength not performed due to recent surgery L R Comment   Flexion         Abduction         ER         IR         Supraspinatus         Upper Trap         Lower Trap         Mid Trap         Rhomboids         Biceps         Triceps         Horizontal Abduction         Horizontal Adduction         Lats                  Reflexes/Sensation:               [x]? Dermatomes/Myotomes intact               []? Reflexes equal and normal bilaterally               []? Other:     Joint mobility:               []? Normal               [x]? Hypo due to recent surgery but not formally assessed              []? Hyper     Palpation: general TTP due to recent surgery     Functional Mobility/Transfers: modified I with ultra sling R UE     Posture: slouched  With ultra sling, reviewed proper  Fit for sling.     Bandages/Dressings/Incisions: healing well with (-) signs of infection 5/19      Gait: (include devices/WB status): modified I with ultrasling      RESTRICTIONS/PRECAUTIONS: RCR, bicep tenodesis protocols    Exercises/Interventions:   Exercises:  Exercise/Equipment Resistance/Repetitions Other comments   Stretching/PROM     Pulleys  3'     Table Slides Fl [id number]  abd [id number]    Elbow PROM     Pulleys 3.5'     Pendulum     fav 4             Ll/ld gentle bicep     Wall walk                  4 x 10\"    Isometrics     Retraction          shrugs     Flexion     Abduction     External Rotation     Internal Rotation     Biceps     Triceps          PRE's     Flexion     Abduction     External Rotation     Internal Rotation     Shrugs     EXT     Reverse Flys     Serratus     Horizontal Abd with ER     Biceps     Triceps     Retraction 30x yellow          Cable Column/Theraband     External Rotation     Internal Rotation     Shrugs     Lats     Ext     Flex     Scapular Retraction     BIC     TRIC     PNF          Dynamic Stability          Plyoback          Manual interventions Prom/stm  25'                Therapeutic Exercise and NMR EXR  [x] (64970) Provided verbal/tactile cueing for activities related to strengthening, flexibility, endurance, ROM  for improvements in scapular, scapulothoracic and UE control with self care, reaching, carrying, lifting, house/yardwork, driving/computer work.    [] (17421) Provided verbal/tactile cueing for activities related to improving balance, coordination, kinesthetic sense, posture, motor skill, proprioception  to assist with  scapular, scapulothoracic and UE control with self care, reaching, carrying, lifting, house/yardwork, driving/computer work. Therapeutic Activities:    [] (62646 or 82891) Provided verbal/tactile cueing for activities related to improving balance, coordination, kinesthetic sense, posture, motor skill, proprioception and motor activation to allow for proper function of scapular, scapulothoracic and UE control with self care, carrying, lifting, driving/computer work.      Home Exercise Program:    [x] (15053) Reviewed/Progressed HEP activities related to strengthening, flexibility, endurance, ROM of scapular, scapulothoracic and UE control with self care, reaching, carrying, lifting, house/yardwork, driving/computer work  [] (51675) Reviewed/Progressed HEP activities related to improving balance, coordination, kinesthetic sense, posture, motor skill, proprioception of scapular, scapulothoracic and UE control with self care, reaching, carrying, lifting, house/yardwork, driving/computer work      Manual Treatments:  PROM / STM / Oscillations-Mobs:  G-I, II, III, IV (PA's, Inf., Post.)  [] (47541) Provided manual therapy to mobilize soft tissue/joints of cervical/CT, scapular GHJ and UE for the purpose of modulating pain, promoting relaxation,  increasing ROM, reducing/eliminating soft tissue swelling/inflammation/restriction, improving soft tissue extensibility and allowing for proper ROM for normal function with self care, reaching, carrying, lifting, house/yardwork, driving/computer work    Modalities:  Ice HEP on 20off 40 thru out day    Charges:  Timed Code Treatment Minutes: 50'    Total Treatment Minutes: 50'        [] EVAL (LOW) 72685 (typically 20 minutes face-to-face)  [] EVAL (MOD) 71331 (typically 30 minutes face-to-face)  [] EVAL (HIGH) 77592 (typically 45 minutes face-to-face)  [] RE-EVAL     [x] NH(45673) x  1   [] IONTO  [] NMR (94147)     [] VASO  [x] Manual (29554) x    2  [] Other:  [] TA x      [] Mech Traction (26513)  [] ES(attended) (87108)      [] ES (un) (85838):     GOALS:  Patient stated goal: return to running,golf, playing with young kids    []? Progressing: []? Met: []? Not Met: []? Adjusted     Therapist goals for Patient:   Short Term Goals: To be achieved in: 2 -4 weeks  1. Independent in HEP and progression per patient tolerance, in order to prevent re-injury. []? Progressing: []? Met: []? Not Met: []? Adjusted   2. Patient will have a decrease in pain to facilitate improvement in movement, function, and ADLs as indicated by Functional Deficits. []? Progressing: []? Met: []? Not Met: []? Adjusted     Long Term Goals: To be achieved in: 12 weeks  1. Disability index score of 20% or less for the UEFS to assist with reaching prior level of function. [x]? Progressing: []? Met: []? Not Met: []? Adjusted  2. Patient will demonstrate increased AROM to WNL to allow for proper joint functioning as indicated by patients Functional Deficits. [x]? Progressing: []? Met: []? Not Met: []? Adjusted  3. Patient will demonstrate an increase in strength to good scapular and core control  for UE to allow for proper functional mobility as indicated by patients Functional Deficits. [x]? Progressing: []? Met: []? Not Met: []? Adjusted  4. Patient will return to light adls personal hygiene, dressing, small meal prep functional activities without increased symptoms or restriction. [x]? Progressing: []?  Met: []? Towel Slide at Table Top - 10 reps - 1 sets - 10 hold - 3x daily - 7x weekly   Seated Shoulder Abduction Towel Slide at Table Top - 10 reps - 1 sets - 10 hold - 3x daily - 7x weekly      PLAN: See sharonda 5/12/2020  [x] Continue per plan of care [] Alter current plan (see comments above)  [] Plan of care initiated [] Hold pending MD visit [] Discharge    Decrease frequency to 1x/week for the next several weeks. Electronically signed by:  Gurdeep Ye PT, MPT     Note: If patient does not return for scheduled/ recommended follow up visits, this note will serve as a discharge from care along with most recent update on progress.

## 2020-06-30 NOTE — FLOWSHEET NOTE
The [address] [facility],[address], [facility]      Physical Therapy  Cancellation/No-show Note  Patient Name:  Augustina Benites  :  1980   Date:  2020  Cancelled visits to date: 1  No-shows to date: 0    For today's appointment patient:  [x]  Cancelled  [x]  Rescheduled appointment  []  No-show     Reason given by patient:  []  Patient ill  []  Conflicting appointment  []  No transportation    [x]  Conflict with work  []  No reason given  []  Other:     Comments:      Electronically signed by:  Henry Silva, [address] [place] [id number]

## 2020-07-01 ENCOUNTER — HOSPITAL ENCOUNTER (OUTPATIENT)
Dept: PHYSICAL THERAPY | Age: 40
Setting detail: THERAPIES SERIES
Discharge: HOME | End: 2020-07-01
Payer: COMMERCIAL

## 2020-07-01 PROCEDURE — 97140 MANUAL THERAPY 1/> REGIONS: CPT | Performed by: SPECIALIST/TECHNOLOGIST

## 2020-07-01 PROCEDURE — 97110 THERAPEUTIC EXERCISES: CPT | Performed by: SPECIALIST/TECHNOLOGIST

## 2020-07-01 NOTE — FLOWSHEET NOTE
IR             Other  (cervical)             Other                    Strength not performed due to recent surgery L R Comment   Flexion         Abduction         ER         IR         Supraspinatus         Upper Trap         Lower Trap         Mid Trap         Rhomboids         Biceps         Triceps         Horizontal Abduction         Horizontal Adduction         Lats                  Reflexes/Sensation:               [x]? Dermatomes/Myotomes intact               []? Reflexes equal and normal bilaterally               []? Other:     Joint mobility:               []? Normal               [x]? Hypo due to recent surgery but not formally assessed              []? Hyper     Palpation: general TTP due to recent surgery     Functional Mobility/Transfers: modified I with ultra sling R UE     Posture: slouched  With ultra sling, reviewed proper  Fit for sling.     Bandages/Dressings/Incisions: healing well with (-) signs of infection 5/19      Gait: (include devices/WB status): modified I with ultrasling      RESTRICTIONS/PRECAUTIONS: RCR, bicep tenodesis protocols    Exercises/Interventions: DOS 5/6/20, MD appt.  7/7/20  Exercises:  Exercise/Equipment Resistance/Repetitions Other comments   Stretching/PROM     Wand     Table Slides Fl [id number]  abd [id number]       Pulleys 3'    Pendulum     fav 4       Cane ER - seated      Ll/ld gentle bicep     Wall walk     Sitting SB rollout + wedge  Flex (passive) supine/ seated x 15 ea  15 x 5\"      10' (5' x 2)     4 x 10\" with LUE assist    X 15          Isometrics     Retraction x30       shrugs x30    Flexion     Abduction     External Rotation     Internal Rotation     Biceps     Triceps          PRE's     Flexion     Abduction     External Rotation     Internal Rotation     Shrugs     EXT     Reverse Flys     Serratus     Horizontal Abd with ER     Biceps     Triceps             Cable Column/Theraband     External Rotation     Internal Rotation     Shrugs     Lats     Ext Flex     Scapular Retraction     BIC     TRIC     PNF          Dynamic Stability          Plyoback          Manual interventions     Prom/stm  25'                Therapeutic Exercise and NMR EXR  [x] (31228) Provided verbal/tactile cueing for activities related to strengthening, flexibility, endurance, ROM  for improvements in scapular, scapulothoracic and UE control with self care, reaching, carrying, lifting, house/yardwork, driving/computer work.    [] (70015) Provided verbal/tactile cueing for activities related to improving balance, coordination, kinesthetic sense, posture, motor skill, proprioception  to assist with  scapular, scapulothoracic and UE control with self care, reaching, carrying, lifting, house/yardwork, driving/computer work. Therapeutic Activities:    [] (85885 or 51113) Provided verbal/tactile cueing for activities related to improving balance, coordination, kinesthetic sense, posture, motor skill, proprioception and motor activation to allow for proper function of scapular, scapulothoracic and UE control with self care, carrying, lifting, driving/computer work.      Home Exercise Program:    [x] (18714) Reviewed/Progressed HEP activities related to strengthening, flexibility, endurance, ROM of scapular, scapulothoracic and UE control with self care, reaching, carrying, lifting, house/yardwork, driving/computer work  [] (98351) Reviewed/Progressed HEP activities related to improving balance, coordination, kinesthetic sense, posture, motor skill, proprioception of scapular, scapulothoracic and UE control with self care, reaching, carrying, lifting, house/yardwork, driving/computer work      Manual Treatments:  PROM / STM / Oscillations-Mobs:  G-I, II, III, IV (PA's, Inf., Post.)  [] (57782) Provided manual therapy to mobilize soft tissue/joints of cervical/CT, scapular GHJ and UE for the purpose of modulating pain, promoting relaxation,  increasing ROM, reducing/eliminating soft tissue swelling/inflammation/restriction, improving soft tissue extensibility and allowing for proper ROM for normal function with self care, reaching, carrying, lifting, house/yardwork, driving/computer work    Modalities:  Ice HEP on 20off 40 thru out day    Charges:  Timed Code Treatment Minutes: 50'    Total Treatment Minutes: 50'        [] EVAL (LOW) 79202 (typically 20 minutes face-to-face)  [] EVAL (MOD) 42121 (typically 30 minutes face-to-face)  [] EVAL (HIGH) 26685 (typically 45 minutes face-to-face)  [] RE-EVAL     [x] HY(30509) x  1   [] IONTO  [] NMR (24158)     [] VASO  [x] Manual (11189) x    2  [] Other:  [] TA x      [] Mech Traction (18015)  [] ES(attended) (89569)      [] ES (un) (12437):     GOALS:  Patient stated goal: return to running,golf, playing with young kids    []? Progressing: []? Met: []? Not Met: []? Adjusted     Therapist goals for Patient:   Short Term Goals: To be achieved in: 2 -4 weeks  1. Independent in HEP and progression per patient tolerance, in order to prevent re-injury. []? Progressing: []? Met: []? Not Met: []? Adjusted   2. Patient will have a decrease in pain to facilitate improvement in movement, function, and ADLs as indicated by Functional Deficits. []? Progressing: []? Met: []? Not Met: []? Adjusted     Long Term Goals: To be achieved in: 12 weeks  1. Disability index score of 20% or less for the UEFS to assist with reaching prior level of function. [x]? Progressing: []? Met: []? Not Met: []? Adjusted  2. Patient will demonstrate increased AROM to WNL to allow for proper joint functioning as indicated by patients Functional Deficits. [x]? Progressing: []? Met: []? Not Met: []? Adjusted  3. Patient will demonstrate an increase in strength to good scapular and core control  for UE to allow for proper functional mobility as indicated by patients Functional Deficits. [x]? Progressing: []? Met: []? Not Met: []? Adjusted  4.  Patient will return to light adls personal hygiene, dressing, small meal prep functional activities without increased symptoms or restriction. [x]? Progressing: []? Met: []? Not Met: []? Adjusted  5. Patient will return to heavy adls yardwork, lifting greater 20+# functional activities without increased symptoms or restriction  [x]? Progressing: []? Met: []? Not Met: []? Adjusted        Overall Progression Towards Functional goals/ Treatment Progress Update:  [] Patient is progressing as expected towards functional goals listed. [] Progression is slowed due to complexities/Impairments listed. [] Progression has been slowed due to co-morbidities. [x] Plan just implemented, too soon to assess goals progression <30days   [] Goals require adjustment due to lack of progress  [] Patient is not progressing as expected and requires additional follow up with physician  [] Other    Prognosis for POC: [x] Good [] Fair  [] Poor      Patient requires continued skilled intervention: [x] Yes  [] No    Treatment/Activity Tolerance:  [x] Patient able to complete treatment  [] Patient limited by fatigue  [] Patient limited by pain     [] Patient limited by other medical complications  [x] Other: 5/19  No complaints with today's program.  ROM is progressing as expected. Incision site is healing well with (-) signs of infection. Pt demonstrated independence with current program and compliance with surgical restrictions. Patient Education:   Reviewed diagnosis, POC, HEP and its importance. HEP instruction:    Access Code: [email or web address]   URL: [email or web address]   Date: 05/12/2020   Prepared by: Jessica Semen     Exercises   · Seated Shoulder Shrugs - 10 reps - 3 sets - 1 - 2 hold - 3x daily - 7x weekly   · Seated Scapular Retraction - 10 reps - 3 sets - 1-2 hold - 3x daily - 7x weekly   · Supported Elbow Flexion Extension PROM - 10 reps - 1 sets - 10 hold - 3x daily - 7x weekly   · Putty Squeezes - 10 reps - 1 sets - 10 hold - 3x daily - 7x weekly   · Seated Cervical Sidebending Stretch - 5 reps - 3 sets - 30 hold - 3x daily - 7x weekly   · Seated Shoulder Flexion Towel Slide at Table Top - 10 reps - 1 sets - 10 hold - 3x daily - 7x weekly   Seated Shoulder Abduction Towel Slide at Table Top - 10 reps - 1 sets - 10 hold - 3x daily - 7x weekly      PLAN: See eval 5/12/2020  [x] Continue per plan of care [] Alter current plan (see comments above)  [] Plan of care initiated [] Hold pending MD visit [] Discharge    Decrease frequency to 1x/week for the next several weeks. Electronically signed by:  Rosalba Howard, PTA  [id number], Vielka Owen, PT, MPT     Note: If patient does not return for scheduled/ recommended follow up visits, this note will serve as a discharge from care along with most recent update on progress.

## 2020-07-07 ENCOUNTER — OFFICE VISIT (OUTPATIENT)
Dept: ORTHOPEDIC SURGERY | Age: 40
End: 2020-07-07

## 2020-07-07 VITALS — WEIGHT: 185 LBS | TEMPERATURE: 98.7 F | HEIGHT: 65 IN | BODY MASS INDEX: 30.82 KG/M2

## 2020-07-07 PROCEDURE — 99024 POSTOP FOLLOW-UP VISIT: CPT | Performed by: ORTHOPAEDIC SURGERY

## 2020-07-07 NOTE — PROGRESS NOTES
History of Present Illness:  Daly Crawford is a 45 y.o. male here today for follow up evaluation of the right shoulder. He is 2 months status post rotator cuff repair on 5/6/2020. He has been compliant with post operative physical therapy. He states he is doing well and trending in a good direction, range of motion has improved. Denies fevers or chills. No new injuries reported. Medical History:  Patient's medications, allergies, past medical, surgical, social and family histories were reviewed and updated as appropriate. Pertinent items are noted in HPI  Review of systems reviewed from Patient History Form completed today and available in the patient's chart under the Media tab.             Past Medical History:   Diagnosis Date    Eczema     Mixed hyperlipidemia 12/9/2011    Seasonal allergic rhinitis     ait in past    Traumatic incomplete tear of right rotator cuff 5/1/2020        Past Surgical History:   Procedure Laterality Date    DENTAL SURGERY      wisdom teeth    HAND SURGERY  2008    bb removed left hand    LASIK  2008    ou    SHOULDER SURGERY Right 5/6/2020    RIGHT SHOULDER SCOPE/ ROTATOR CUFF REPAIR/ SUBPECTORIAL BICEPS TENODESIS/ SUBACROMIAL DECOMPRESSION performed by Jorge Luis Chen MD at [address]  02/2017       Family History   Problem Relation Age of Onset    Heart Disease Father 48        ami    High Blood Pressure Father     High Cholesterol Father     Heart Failure Paternal Grandfather [de-identified]    Heart Disease Paternal Grandfather         CHF    High Blood Pressure Paternal Grandfather     High Cholesterol Paternal Grandfather     High Blood Pressure Mother     High Cholesterol Mother     High Cholesterol Brother     Emphysema Maternal Grandmother     High Blood Pressure Maternal Grandmother     High Cholesterol Maternal Grandmother     High Blood Pressure Maternal Grandfather     High Cholesterol Maternal Grandfather     Heart Disease Paternal Grandmother         CHF    High Blood Pressure Paternal Grandmother     High Cholesterol Paternal Grandmother     High Cholesterol Brother        Social History     Socioeconomic History    Marital status:      Spouse name: Not on file    Number of children: Not on file    Years of education: Not on file    Highest education level: Not on file   Occupational History    Not on file   Social Needs    Financial resource strain: Not on file    Food insecurity     Worry: Not on file     Inability: Not on file   [employer] needs     Medical: Not on file     Non-medical: Not on file   Tobacco Use    Smoking status: Never Smoker    Smokeless tobacco: Former User   Substance and Sexual Activity    Alcohol use: Yes     Comment: occasional - 2-3 drinks weekly    Drug use: No    Sexual activity: Yes     Partners: Female   Lifestyle    Physical activity     Days per week: Not on file     Minutes per session: Not on file    Stress: Not on file   Relationships    Social connections     Talks on phone: Not on file     Gets together: Not on file     Attends Quaker service: Not on file     Active member of club or organization: Not on file     Attends meetings of clubs or organizations: Not on file     Relationship status: Not on file    Intimate partner violence     Fear of current or ex partner: Not on file     Emotionally abused: Not on file     Physically abused: Not on file     Forced sexual activity: Not on file   Other Topics Concern    Not on file   Social History Narrative    Not on file       Current Outpatient Medications   Medication Sig Dispense Refill    fenofibrate (TRIGLIDE) 160 MG tablet TAKE 1 TABLET BY MOUTH ONE TIME A DAY 30 tablet 5    DUPIXENT 300 MG/2ML SOSY injection Inject 300 mLs as directed every 14 days       Crisaborole (EUCRISA) 2 % OINT Apply twice daily to affected area(s) for facial eczema. 60 g 1     No current facility-administered medications for this visit.

## 2020-07-09 ENCOUNTER — HOSPITAL ENCOUNTER (OUTPATIENT)
Dept: PHYSICAL THERAPY | Age: 40
Discharge: HOME | End: 2020-07-09
Payer: COMMERCIAL

## 2020-07-09 PROCEDURE — 97110 THERAPEUTIC EXERCISES: CPT | Performed by: PHYSICAL THERAPIST

## 2020-07-09 PROCEDURE — 97112 NEUROMUSCULAR REEDUCATION: CPT | Performed by: PHYSICAL THERAPIST

## 2020-07-09 PROCEDURE — 97140 MANUAL THERAPY 1/> REGIONS: CPT | Performed by: PHYSICAL THERAPIST

## 2020-07-11 NOTE — FLOWSHEET NOTE
The [address] [place] [address] and Sports [facility]  Physical Therapy Daily Treatment Note  Date:  2020  Patient Name:  Lazara Estrada    :  1980  MRN: [id number]  Restrictions/Precautions:    Medical/Treatment Diagnosis Information:  Diagnosis: R rotator cuff tear  Treatment Diagnosis: R shoulder pain       DATE OF PROCEDURE:  2020     OPERATIONS PERFORMED:  Right shoulder arthroscopy with labral  debridement, open subpectoral biceps tenodesis, arthroscopic subacromial  decompression with rotator cuff repair.     SURGEON: Carolyn Falk MD     ASSISTANT: Alex Henry MD     ANESTHESIA:  General.     PREOPERATIVE DIAGNOSES:  Rotator cuff tear, biceps tendinopathy with  SLAP tear and subacromial impingement.     POSTOPERATIVE DIAGNOSES:  Rotator cuff tear, biceps tendinopathy with  SLAP tear and subacromial impingement.               Insurance/Certification information:  PT Insurance Information: [facility]   Physician Information:  Referring Practitioner: Carolyn Falk  Has the plan of care been signed (Y/N):        []  Yes  [x]  No     Date of Patient follow up with Physician: per MD      Is this a Progress Report:     []  Yes  [x]  No        If Yes:  Date Range for reporting period:  Beginning2020  Ending    Progress report will be due (10 Rx or 30 days whichever is less): 7/10/2793      Recertification will be due (POC Duration  / 90 days whichever is less): 2020        Visit # Insurance Allowable Auth Required    []  Yes [x]  No        Functional Scale: UEFS 90%   Date assessed:  2020     Latex Allergy:  [x]NO      []YES  Preferred Language for Healthcare:   [x]English       []other:    Pain level:  1-2/10 2020     SUBJECTIVE: \"I am coming along nicely\"    OBJECTIVE:     ROM PROM AROM  Comment     L R L R     Flexion   ~100 table slides         Abduction   ~90 table slide         ER             IR             Other  (cervical)           Other                    Strength not performed due to recent surgery L R Comment   Flexion         Abduction         ER         IR         Supraspinatus         Upper Trap         Lower Trap         Mid Trap         Rhomboids         Biceps         Triceps         Horizontal Abduction         Horizontal Adduction         Lats                  Reflexes/Sensation:               [x]? Dermatomes/Myotomes intact               []? Reflexes equal and normal bilaterally               []? Other:     Joint mobility:               []? Normal               [x]? Hypo due to recent surgery but not formally assessed              []? Hyper     Palpation: general TTP due to recent surgery     Functional Mobility/Transfers: modified I with ultra sling R UE     Posture: slouched  With ultra sling, reviewed proper  Fit for sling.     Bandages/Dressings/Incisions: healing well with (-) signs of infection 5/19      Gait: (include devices/WB status): modified I with ultrasling      RESTRICTIONS/PRECAUTIONS: RCR, bicep tenodesis protocols    Exercises/Interventions: DOS 5/6/20, MD appt.  7/7/20  Exercises:  Exercise/Equipment Resistance/Repetitions Other comments   Stretching/PROM     Wand             Pulleys 3'    Pendulum                 Ll/ld gentle bicep     Wall walk     Sitting SB rollout + wedge                  6 x 10\"   0   Isometrics     Retraction x30       shrugs x30    Flexion     Abduction     External Rotation     Internal Rotation     Biceps     Triceps          PRE's     Flexion     Abduction     External Rotation sidelying - eccentric: 20x     Internal Rotation     Shrugs     EXT     Reverse Flys     Serratus     Horizontal Abd with ER     Biceps     Triceps     Retraction 30x yellow     AA reacher scaptions  30x    Cable Column/Theraband     External Rotation     Internal Rotation     Shrugs     Lats     Ext     Flex     Scapular Retraction     BIC     TRIC     PNF          Dynamic Stability          Plyoback          Manual interventions     Prom/stm  25'                Therapeutic Exercise and NMR EXR  [x] (45847) Provided verbal/tactile cueing for activities related to strengthening, flexibility, endurance, ROM  for improvements in scapular, scapulothoracic and UE control with self care, reaching, carrying, lifting, house/yardwork, driving/computer work.    [] (51641) Provided verbal/tactile cueing for activities related to improving balance, coordination, kinesthetic sense, posture, motor skill, proprioception  to assist with  scapular, scapulothoracic and UE control with self care, reaching, carrying, lifting, house/yardwork, driving/computer work. Therapeutic Activities:    [] (33988 or 56748) Provided verbal/tactile cueing for activities related to improving balance, coordination, kinesthetic sense, posture, motor skill, proprioception and motor activation to allow for proper function of scapular, scapulothoracic and UE control with self care, carrying, lifting, driving/computer work.      Home Exercise Program:    [x] (94555) Reviewed/Progressed HEP activities related to strengthening, flexibility, endurance, ROM of scapular, scapulothoracic and UE control with self care, reaching, carrying, lifting, house/yardwork, driving/computer work  [] (45772) Reviewed/Progressed HEP activities related to improving balance, coordination, kinesthetic sense, posture, motor skill, proprioception of scapular, scapulothoracic and UE control with self care, reaching, carrying, lifting, house/yardwork, driving/computer work      Manual Treatments:  PROM / STM / Oscillations-Mobs:  G-I, II, III, IV (PA's, Inf., Post.)  [] (17082) Provided manual therapy to mobilize soft tissue/joints of cervical/CT, scapular GHJ and UE for the purpose of modulating pain, promoting relaxation,  increasing ROM, reducing/eliminating soft tissue swelling/inflammation/restriction, improving soft tissue extensibility and allowing for proper ROM for normal function with self care, reaching, carrying, lifting, house/yardwork, driving/computer work    Modalities:  Ice HEP on 20off 40 thru out day    Charges:  Timed Code Treatment Minutes: 61'    Total Treatment Minutes: 61'        [] EVAL (LOW) 29265 (typically 20 minutes face-to-face)  [] EVAL (MOD) 00258 (typically 30 minutes face-to-face)  [] EVAL (HIGH) 17870 (typically 45 minutes face-to-face)  [] RE-EVAL     [x] GL(53904) x  1   [] IONTO  [x] NMR (53179)   1  [] VASO  [x] Manual (61178) x    2  [] Other:  [] TA x      [] Mech Traction (72710)  [] ES(attended) (17840)      [] ES (un) (01081):     GOALS:  Patient stated goal: return to running,golf, playing with young kids    []? Progressing: []? Met: []? Not Met: []? Adjusted     Therapist goals for Patient:   Short Term Goals: To be achieved in: 2 -4 weeks  1. Independent in HEP and progression per patient tolerance, in order to prevent re-injury. []? Progressing: []? Met: []? Not Met: []? Adjusted   2. Patient will have a decrease in pain to facilitate improvement in movement, function, and ADLs as indicated by Functional Deficits. []? Progressing: []? Met: []? Not Met: []? Adjusted     Long Term Goals: To be achieved in: 12 weeks  1. Disability index score of 20% or less for the UEFS to assist with reaching prior level of function. [x]? Progressing: []? Met: []? Not Met: []? Adjusted  2. Patient will demonstrate increased AROM to WNL to allow for proper joint functioning as indicated by patients Functional Deficits. [x]? Progressing: []? Met: []? Not Met: []? Adjusted  3. Patient will demonstrate an increase in strength to good scapular and core control  for UE to allow for proper functional mobility as indicated by patients Functional Deficits. [x]? Progressing: []? Met: []? Not Met: []? Adjusted  4. Patient will return to light adls personal hygiene, dressing, small meal prep functional activities without increased symptoms or restriction. [x]? Progressing: []? Met: []? Not Met: []? Adjusted  5. Patient will return to heavy adls yardwork, lifting greater 20+# functional activities without increased symptoms or restriction  [x]? Progressing: []? Met: []? Not Met: []? Adjusted        Overall Progression Towards Functional goals/ Treatment Progress Update:  [] Patient is progressing as expected towards functional goals listed. [] Progression is slowed due to complexities/Impairments listed. [] Progression has been slowed due to co-morbidities. [x] Plan just implemented, too soon to assess goals progression <30days   [] Goals require adjustment due to lack of progress  [] Patient is not progressing as expected and requires additional follow up with physician  [] Other    Prognosis for POC: [x] Good [] Fair  [] Poor      Patient requires continued skilled intervention: [x] Yes  [] No    Treatment/Activity Tolerance:  [x] Patient able to complete treatment  [] Patient limited by fatigue  [] Patient limited by pain     [] Patient limited by other medical complications  [x] Other: 5/19  No complaints with today's program.  ROM is progressing as expected. Incision site is healing well with (-) signs of infection. Pt demonstrated independence with current program and compliance with surgical restrictions. Patient Education:   Reviewed diagnosis, POC, HEP and its importance. HEP instruction:    Access Code: [email or web address]   URL: [email or web address]   Date: 05/12/2020   Prepared by: Tato George     Exercises   · Seated Shoulder Shrugs - 10 reps - 3 sets - 1 - 2 hold - 3x daily - 7x weekly   · Seated Scapular Retraction - 10 reps - 3 sets - 1-2 hold - 3x daily - 7x weekly   · Supported Elbow Flexion Extension PROM - 10 reps - 1 sets - 10 hold - 3x daily - 7x weekly   · Putty Squeezes - 10 reps - 1 sets - 10 hold - 3x daily - 7x weekly   · Seated Cervical Sidebending Stretch - 5 reps - 3 sets - 30 hold - 3x daily - 7x weekly · Seated Shoulder Flexion Towel Slide at Table Top - 10 reps - 1 sets - 10 hold - 3x daily - 7x weekly   Seated Shoulder Abduction Towel Slide at Table Top - 10 reps - 1 sets - 10 hold - 3x daily - 7x weekly      PLAN: See sharonda 5/12/2020  [x] Continue per plan of care [] Alter current plan (see comments above)  [] Plan of care initiated [] Hold pending MD visit [] Discharge    Decrease frequency to 1x/week for the next several weeks. Electronically signed by: Marty Griffin, PT, MPT     Note: If patient does not return for scheduled/ recommended follow up visits, this note will serve as a discharge from care along with most recent update on progress.

## 2020-07-13 ENCOUNTER — HOSPITAL ENCOUNTER (OUTPATIENT)
Dept: PHYSICAL THERAPY | Age: 40
Setting detail: THERAPIES SERIES
Discharge: HOME | End: 2020-07-13
Payer: COMMERCIAL

## 2020-07-13 PROCEDURE — 97110 THERAPEUTIC EXERCISES: CPT | Performed by: PHYSICAL THERAPIST

## 2020-07-13 PROCEDURE — 97112 NEUROMUSCULAR REEDUCATION: CPT | Performed by: PHYSICAL THERAPIST

## 2020-07-13 PROCEDURE — 97140 MANUAL THERAPY 1/> REGIONS: CPT | Performed by: PHYSICAL THERAPIST

## 2020-07-14 NOTE — FLOWSHEET NOTE
[address] [facility]  Physical Therapy Daily Treatment Note  Date:  2020  Patient Name:  Neris Fajardo    :  1980  MRN: [id number]  Restrictions/Precautions:    Medical/Treatment Diagnosis Information:  Diagnosis: R rotator cuff tear  Treatment Diagnosis: R shoulder pain       DATE OF PROCEDURE:  2020     OPERATIONS PERFORMED:  Right shoulder arthroscopy with labral  debridement, open subpectoral biceps tenodesis, arthroscopic subacromial  decompression with rotator cuff repair.     SURGEON: Emani Nicholson MD     ASSISTANT: Shaheed Tovar MD     ANESTHESIA:  General.     PREOPERATIVE DIAGNOSES:  Rotator cuff tear, biceps tendinopathy with  SLAP tear and subacromial impingement.     POSTOPERATIVE DIAGNOSES:  Rotator cuff tear, biceps tendinopathy with  SLAP tear and subacromial impingement.               Insurance/Certification information:  PT Insurance Information: [facility]   Physician Information:  Referring Practitioner: Emani Nicholson  Has the plan of care been signed (Y/N):        []  Yes  [x]  No     Date of Patient follow up with Physician: per MD      Is this a Progress Report:     []  Yes  [x]  No        If Yes:  Date Range for reporting period:  Beginning2020  Ending    Progress report will be due (10 Rx or 30 days whichever is less):       Recertification will be due (POC Duration  / 90 days whichever is less): 2020        Visit # Insurance Allowable Auth Required   9  []  Yes [x]  No        Functional Scale: UEFS 90%   Date assessed:  2020     Latex Allergy:  [x]NO      []YES  Preferred Language for Healthcare:   [x]English       []other:    Pain level:  1-2/10 2020     SUBJECTIVE: \"I am coming along nicely\"    OBJECTIVE:     ROM PROM AROM  Comment     L R L R     Flexion   ~100 table slides         Abduction   ~90 table slide         ER             IR             Other  (cervical)           Other                    Strength not performed due to recent surgery L R Comment   Flexion         Abduction         ER         IR         Supraspinatus         Upper Trap         Lower Trap         Mid Trap         Rhomboids         Biceps         Triceps         Horizontal Abduction         Horizontal Adduction         Lats                  Reflexes/Sensation:               [x]? Dermatomes/Myotomes intact               []? Reflexes equal and normal bilaterally               []? Other:     Joint mobility:               []? Normal               [x]? Hypo due to recent surgery but not formally assessed              []? Hyper     Palpation: general TTP due to recent surgery     Functional Mobility/Transfers: modified I with ultra sling R UE     Posture: slouched  With ultra sling, reviewed proper  Fit for sling.     Bandages/Dressings/Incisions: healing well with (-) signs of infection 5/19      Gait: (include devices/WB status): modified I with ultrasling      RESTRICTIONS/PRECAUTIONS: RCR, bicep tenodesis protocols    Exercises/Interventions: DOS 5/6/20, MD appt.  7/7/20  Exercises:  Exercise/Equipment Resistance/Repetitions Other comments   Stretching/PROM     Wand             Pulleys 3'    Pendulum                 Ll/ld gentle bicep     Wall walk     Sitting SB rollout + wedge                  6 x 10\"   0   Isometrics     Retraction x30       shrugs x30    Flexion     Abduction     External Rotation     Internal Rotation     Biceps     Triceps          PRE's     Flexion     Abduction     External Rotation sidelying - eccentric: 20x     Internal Rotation     Shrugs     EXT     Reverse Flys     Serratus     Horizontal Abd with ER     Biceps     Triceps     Retraction 30x yellow     AA reacher scaptions  30x    Cable Column/Theraband     External Rotation     Internal Rotation     Shrugs     Lats     Ext     Flex     Scapular Retraction     BIC     TRIC     PNF          Dynamic Stability          Plyoback          Manual interventions     Prom/stm  25'                Therapeutic Exercise and NMR EXR  [x] (30650) Provided verbal/tactile cueing for activities related to strengthening, flexibility, endurance, ROM  for improvements in scapular, scapulothoracic and UE control with self care, reaching, carrying, lifting, house/yardwork, driving/computer work.    [] (08226) Provided verbal/tactile cueing for activities related to improving balance, coordination, kinesthetic sense, posture, motor skill, proprioception  to assist with  scapular, scapulothoracic and UE control with self care, reaching, carrying, lifting, house/yardwork, driving/computer work. Therapeutic Activities:    [] (53314 or 05454) Provided verbal/tactile cueing for activities related to improving balance, coordination, kinesthetic sense, posture, motor skill, proprioception and motor activation to allow for proper function of scapular, scapulothoracic and UE control with self care, carrying, lifting, driving/computer work.      Home Exercise Program:    [x] (56607) Reviewed/Progressed HEP activities related to strengthening, flexibility, endurance, ROM of scapular, scapulothoracic and UE control with self care, reaching, carrying, lifting, house/yardwork, driving/computer work  [] (97334) Reviewed/Progressed HEP activities related to improving balance, coordination, kinesthetic sense, posture, motor skill, proprioception of scapular, scapulothoracic and UE control with self care, reaching, carrying, lifting, house/yardwork, driving/computer work      Manual Treatments:  PROM / STM / Oscillations-Mobs:  G-I, II, III, IV (PA's, Inf., Post.)  [] (54666) Provided manual therapy to mobilize soft tissue/joints of cervical/CT, scapular GHJ and UE for the purpose of modulating pain, promoting relaxation,  increasing ROM, reducing/eliminating soft tissue swelling/inflammation/restriction, improving soft tissue extensibility and allowing for proper ROM for normal function with self care, reaching, carrying, lifting, house/yardwork, driving/computer work    Modalities:  Ice HEP on 20off 40 thru out day    Charges:  Timed Code Treatment Minutes: 61'    Total Treatment Minutes: 61'        [] EVAL (LOW) 12782 (typically 20 minutes face-to-face)  [] EVAL (MOD) 39856 (typically 30 minutes face-to-face)  [] EVAL (HIGH) 71337 (typically 45 minutes face-to-face)  [] RE-EVAL     [x] OQ(37775) x  1   [] IONTO  [x] NMR (42082)   1  [] VASO  [x] Manual (47737) x    2  [] Other:  [] TA x      [] Mech Traction (43249)  [] ES(attended) (34725)      [] ES (un) (69845):     GOALS:  Patient stated goal: return to running,golf, playing with young kids    []? Progressing: []? Met: []? Not Met: []? Adjusted     Therapist goals for Patient:   Short Term Goals: To be achieved in: 2 -4 weeks  1. Independent in HEP and progression per patient tolerance, in order to prevent re-injury. []? Progressing: []? Met: []? Not Met: []? Adjusted   2. Patient will have a decrease in pain to facilitate improvement in movement, function, and ADLs as indicated by Functional Deficits. []? Progressing: []? Met: []? Not Met: []? Adjusted     Long Term Goals: To be achieved in: 12 weeks  1. Disability index score of 20% or less for the UEFS to assist with reaching prior level of function. [x]? Progressing: []? Met: []? Not Met: []? Adjusted  2. Patient will demonstrate increased AROM to WNL to allow for proper joint functioning as indicated by patients Functional Deficits. [x]? Progressing: []? Met: []? Not Met: []? Adjusted  3. Patient will demonstrate an increase in strength to good scapular and core control  for UE to allow for proper functional mobility as indicated by patients Functional Deficits. [x]? Progressing: []? Met: []? Not Met: []? Adjusted  4. Patient will return to light adls personal hygiene, dressing, small meal prep functional activities without increased symptoms or restriction. [x]? Progressing: []? Met: []? Not Met: []? Adjusted  5. Patient will return to heavy adls yardwork, lifting greater 20+# functional activities without increased symptoms or restriction  [x]? Progressing: []? Met: []? Not Met: []? Adjusted        Overall Progression Towards Functional goals/ Treatment Progress Update:  [] Patient is progressing as expected towards functional goals listed. [] Progression is slowed due to complexities/Impairments listed. [] Progression has been slowed due to co-morbidities. [x] Plan just implemented, too soon to assess goals progression <30days   [] Goals require adjustment due to lack of progress  [] Patient is not progressing as expected and requires additional follow up with physician  [] Other    Prognosis for POC: [x] Good [] Fair  [] Poor      Patient requires continued skilled intervention: [x] Yes  [] No    Treatment/Activity Tolerance:  [x] Patient able to complete treatment  [] Patient limited by fatigue  [] Patient limited by pain     [] Patient limited by other medical complications  [x] Other: 5/19  No complaints with today's program.  ROM is progressing as expected. Incision site is healing well with (-) signs of infection. Pt demonstrated independence with current program and compliance with surgical restrictions. Patient Education:   Reviewed diagnosis, POC, HEP and its importance. HEP instruction:    Access Code: [email or web address]   URL: [email or web address]   Date: 05/12/2020   Prepared by: Alexandrea Asa     Exercises   · Seated Shoulder Shrugs - 10 reps - 3 sets - 1 - 2 hold - 3x daily - 7x weekly   · Seated Scapular Retraction - 10 reps - 3 sets - 1-2 hold - 3x daily - 7x weekly   · Supported Elbow Flexion Extension PROM - 10 reps - 1 sets - 10 hold - 3x daily - 7x weekly   · Putty Squeezes - 10 reps - 1 sets - 10 hold - 3x daily - 7x weekly   · Seated Cervical Sidebending Stretch - 5 reps - 3 sets - 30 hold - 3x daily - 7x weekly · Seated Shoulder Flexion Towel Slide at Table Top - 10 reps - 1 sets - 10 hold - 3x daily - 7x weekly   Seated Shoulder Abduction Towel Slide at Table Top - 10 reps - 1 sets - 10 hold - 3x daily - 7x weekly      PLAN: See eval 5/12/2020  [x] Continue per plan of care [] Alter current plan (see comments above)  [] Plan of care initiated [] Hold pending MD visit [] Discharge    Decrease frequency to 1x/week for the next several weeks. Electronically signed by: Talon Sadler PT, MPT     Note: If patient does not return for scheduled/ recommended follow up visits, this note will serve as a discharge from care along with most recent update on progress.

## 2020-07-15 ENCOUNTER — HOSPITAL ENCOUNTER (OUTPATIENT)
Dept: PHYSICAL THERAPY | Age: 40
Setting detail: THERAPIES SERIES
Discharge: HOME | End: 2020-07-15
Payer: COMMERCIAL

## 2020-07-15 PROCEDURE — 97140 MANUAL THERAPY 1/> REGIONS: CPT | Performed by: PHYSICAL THERAPIST

## 2020-07-15 PROCEDURE — 97110 THERAPEUTIC EXERCISES: CPT | Performed by: PHYSICAL THERAPIST

## 2020-07-16 ENCOUNTER — APPOINTMENT (OUTPATIENT)
Dept: PHYSICAL THERAPY | Age: 40
End: 2020-07-16
Payer: COMMERCIAL

## 2020-07-17 NOTE — PROGRESS NOTES
[]  The patient has associated variables that influence the amount of treatment to include:  Social support, self-efficacy/motivation, prognosis, time since onset/acuity. []  The patient has generalized musculoskeletal conditions or a condition affecting multiple sites that will have a direct impact on the rate of recovery. []  The patient had a prior episode of outpatient therapy during this calendar year for a different condition. []  The patient has a mental or cognitive disorder in addition to the condition being treated that will have a direct and significant impact on the rate of recovery. ASSESSMENT:    Response to Treatment:   - Patient is responding well to treatment and improvement is noted with regards to goals    Updated Functional deficiencies/Impairments: The patient demonstrated at least    % but less than  % impairment, limitation or restriction in:   - carrying, moving and handling objects.  - Decreased upper extremity functional strength and neuromuscular control - Reduced overall functional level with carrying /lifting  - Noted GHJ joint hypomobility- Reduced overall functional level with carrying /lifting   - Noted cervicothoracic joint hypomobility- Reduced overall functional level with carrying /lifting   - Pain/difficulty with driving and/or computer work- Reduced overall functional level   - Pain/difficulty associated with self care tasks- Reduced overall functional level and ADL status  - Pain/difficulty with lifting/reaching/carrying - Reduced overall functional level with carrying and lifting  - Pain/difficulty with household work- Reduced ADL status  - Unable to perform sport/recreational activity due to pain and dysfunction    Updated Classification:  - signs/symptoms consistent with post-surgical status including decreased ROM, strength and function. -- bony or soft tissue surgical procedure. (Pattern 4I)  - ligament or other connective tissue dysfunction.  (Pattern 4D)  - localized inflammation. (Pattern 4E)    Prognosis/Rehab Potential:       - Good     Tolerance of evaluation/treatment:     - Good     New/Updated Goals (if applicable):  [] No change to goals established upon initial eval/last progress note:  New Goals:  1. I with hep  2. GOALS:      Plan of Care:  [] Continue Current Therapy Intervention    Frequency/Duration:  *** days per week for *** Weeks:  Interventions:  1. Therapeutic exercise including: strength training, ROM, NMR and proprioception for the scapula, core and Upper extremity  2. Manual therapy as indicated including Dry Needling/IASTM, STM, PROM, Gr I-IV mobilizations, spinal mobilization/manipulation. 3. Modalities as needed including: thermal agents, E-stim, US, iontophoresis as indicated. 4. Patient education on joint protection, activity modification, progression of HEP.         Electronically signed by:  Pancho Childress PT

## 2020-07-17 NOTE — FLOWSHEET NOTE
[address] [facility]  Physical Therapy Daily Treatment Note  Date:  7/15/2020  Patient Name:  Neris Fajardo    :  1980  MRN: [id number]  Restrictions/Precautions:    Medical/Treatment Diagnosis Information:  Diagnosis: R rotator cuff tear  Treatment Diagnosis: R shoulder pain       DATE OF PROCEDURE:  2020     OPERATIONS PERFORMED:  Right shoulder arthroscopy with labral  debridement, open subpectoral biceps tenodesis, arthroscopic subacromial  decompression with rotator cuff repair.     SURGEON: Emani Nicholson MD     ASSISTANT: Shaheed Tovar MD     ANESTHESIA:  General.     PREOPERATIVE DIAGNOSES:  Rotator cuff tear, biceps tendinopathy with  SLAP tear and subacromial impingement.     POSTOPERATIVE DIAGNOSES:  Rotator cuff tear, biceps tendinopathy with  SLAP tear and subacromial impingement.               Insurance/Certification information:  PT Insurance Information: [facility] [place]   Physician Information:  Referring Practitioner: Emani Nicholson  Has the plan of care been signed (Y/N):        []  Yes  [x]  No     Date of Patient follow up with Physician: per MD      Is this a Progress Report:     []  Yes  [x]  No        If Yes:  Date Range for reporting period:  Beginning2020  Ending    Progress report will be due (10 Rx or 30 days whichever is less): 3/13/7914      Recertification will be due (POC Duration  / 90 days whichever is less): 2020        Visit # Insurance Allowable Auth Required   10 unl 80/ []  Yes [x]  No        Functional Scale: UEFS 90%   Date assessed:  2020     Latex Allergy:  [x]NO      []YES  Preferred Language for Healthcare:   [x]English       []other:    Pain level:  1-2/10 2020     SUBJECTIVE: progress note     OBJECTIVE: progress note     ROM PROM AROM  Comment     L R L R     Flexion   ~100 table slides         Abduction   ~90 table slide         ER             IR             Other  (cervical)           Other                    Strength not performed due to recent surgery L R Comment   Flexion         Abduction         ER         IR         Supraspinatus         Upper Trap         Lower Trap         Mid Trap         Rhomboids         Biceps         Triceps         Horizontal Abduction         Horizontal Adduction         Lats                  Reflexes/Sensation:               [x]? Dermatomes/Myotomes intact               []? Reflexes equal and normal bilaterally               []? Other:     Joint mobility:               []? Normal               [x]? Hypo due to recent surgery but not formally assessed              []? Hyper     Palpation: general TTP due to recent surgery     Functional Mobility/Transfers: modified I with ultra sling R UE     Posture: slouched  With ultra sling, reviewed proper  Fit for sling.     Bandages/Dressings/Incisions: healing well with (-) signs of infection 5/19      Gait: (include devices/WB status): modified I with ultrasling      RESTRICTIONS/PRECAUTIONS: RCR, bicep tenodesis protocols    Exercises/Interventions: DOS 5/6/20, MD appt.  7/7/20  Exercises:  Exercise/Equipment Resistance/Repetitions Other comments   Stretching/PROM     Wand             Pulleys 3'    Pendulum                 Ll/ld gentle bicep     Wall walk     Sitting SB rollout + wedge                  6 x 10\"   0   Isometrics     Retraction x30       shrugs x30    Flexion     Abduction     External Rotation     Internal Rotation     Biceps     Triceps          PRE's     Flexion     Abduction     External Rotation sidelying - eccentric: 20x     Internal Rotation     Shrugs     EXT     Reverse Flys     Serratus     Horizontal Abd with ER     Biceps     Triceps     Retraction 30x yellow     AA reacher scaptions  30x    Cable Column/Theraband     External Rotation     Internal Rotation     Shrugs     Lats     Ext     Flex     Scapular Retraction     BIC     TRIC     PNF          Dynamic Stability          Plyoback          Manual interventions     Prom/stm  25'                Therapeutic Exercise and NMR EXR  [x] (89414) Provided verbal/tactile cueing for activities related to strengthening, flexibility, endurance, ROM  for improvements in scapular, scapulothoracic and UE control with self care, reaching, carrying, lifting, house/yardwork, driving/computer work.    [] (33453) Provided verbal/tactile cueing for activities related to improving balance, coordination, kinesthetic sense, posture, motor skill, proprioception  to assist with  scapular, scapulothoracic and UE control with self care, reaching, carrying, lifting, house/yardwork, driving/computer work. Therapeutic Activities:    [] (30600 or 24344) Provided verbal/tactile cueing for activities related to improving balance, coordination, kinesthetic sense, posture, motor skill, proprioception and motor activation to allow for proper function of scapular, scapulothoracic and UE control with self care, carrying, lifting, driving/computer work.      Home Exercise Program:    [x] (85448) Reviewed/Progressed HEP activities related to strengthening, flexibility, endurance, ROM of scapular, scapulothoracic and UE control with self care, reaching, carrying, lifting, house/yardwork, driving/computer work  [] (13596) Reviewed/Progressed HEP activities related to improving balance, coordination, kinesthetic sense, posture, motor skill, proprioception of scapular, scapulothoracic and UE control with self care, reaching, carrying, lifting, house/yardwork, driving/computer work      Manual Treatments:  PROM / STM / Oscillations-Mobs:  G-I, II, III, IV (PA's, Inf., Post.)  [] (80812) Provided manual therapy to mobilize soft tissue/joints of cervical/CT, scapular GHJ and UE for the purpose of modulating pain, promoting relaxation,  increasing ROM, reducing/eliminating soft tissue swelling/inflammation/restriction, improving soft tissue extensibility and allowing for proper ROM for normal function with self care, reaching, carrying, lifting, house/yardwork, driving/computer work    Modalities:  Ice HEP on 20off 40 thru out day    Charges:  Timed Code Treatment Minutes: 61'    Total Treatment Minutes: 61'        [] EVAL (LOW) 60681 (typically 20 minutes face-to-face)  [] EVAL (MOD) 37476 (typically 30 minutes face-to-face)  [] EVAL (HIGH) 02267 (typically 45 minutes face-to-face)  [] RE-EVAL     [x] CD(19985) x  1   [] IONTO  [x] NMR (03150)   1  [] VASO  [x] Manual (79686) x    2  [] Other:  [] TA x      [] Mech Traction (02510)  [] ES(attended) (43080)      [] ES (un) (40260):     GOALS:  Patient stated goal: return to running,golf, playing with young kids    []? Progressing: []? Met: []? Not Met: []? Adjusted     Therapist goals for Patient:   Short Term Goals: To be achieved in: 2 -4 weeks  1. Independent in HEP and progression per patient tolerance, in order to prevent re-injury. []? Progressing: []? Met: []? Not Met: []? Adjusted   2. Patient will have a decrease in pain to facilitate improvement in movement, function, and ADLs as indicated by Functional Deficits. []? Progressing: []? Met: []? Not Met: []? Adjusted     Long Term Goals: To be achieved in: 12 weeks  1. Disability index score of 20% or less for the UEFS to assist with reaching prior level of function. [x]? Progressing: []? Met: []? Not Met: []? Adjusted  2. Patient will demonstrate increased AROM to WNL to allow for proper joint functioning as indicated by patients Functional Deficits. [x]? Progressing: []? Met: []? Not Met: []? Adjusted  3. Patient will demonstrate an increase in strength to good scapular and core control  for UE to allow for proper functional mobility as indicated by patients Functional Deficits. [x]? Progressing: []? Met: []? Not Met: []? Adjusted  4. Patient will return to light adls personal hygiene, dressing, small meal prep functional activities without increased symptoms or restriction. [x]? · Seated Shoulder Flexion Towel Slide at Table Top - 10 reps - 1 sets - 10 hold - 3x daily - 7x weekly   Seated Shoulder Abduction Towel Slide at Table Top - 10 reps - 1 sets - 10 hold - 3x daily - 7x weekly      PLAN: See eval 5/12/2020  [x] Continue per plan of care [] Alter current plan (see comments above)  [] Plan of care initiated [] Hold pending MD visit [] Discharge    Decrease frequency to 1x/week for the next several weeks. Electronically signed by: Shashank Sidhu PT, MPT     Note: If patient does not return for scheduled/ recommended follow up visits, this note will serve as a discharge from care along with most recent update on progress.

## 2020-07-21 ENCOUNTER — HOSPITAL ENCOUNTER (OUTPATIENT)
Dept: PHYSICAL THERAPY | Age: 40
Setting detail: THERAPIES SERIES
Discharge: HOME | End: 2020-07-21
Payer: COMMERCIAL

## 2020-07-21 PROCEDURE — 97140 MANUAL THERAPY 1/> REGIONS: CPT | Performed by: SPECIALIST/TECHNOLOGIST

## 2020-07-21 PROCEDURE — 97110 THERAPEUTIC EXERCISES: CPT | Performed by: SPECIALIST/TECHNOLOGIST

## 2020-07-21 NOTE — FLOWSHEET NOTE
The [address] [place] [address] and Sports Rehabilitation  Physical Therapy Daily Treatment Note  Date:  7/15/2020  Patient Name:  Eli Yip    :  1980  MRN: [id number]  Restrictions/Precautions:    Medical/Treatment Diagnosis Information:  Diagnosis: R rotator cuff tear  Treatment Diagnosis: R shoulder pain       DATE OF PROCEDURE:  2020     OPERATIONS PERFORMED:  Right shoulder arthroscopy with labral  debridement, open subpectoral biceps tenodesis, arthroscopic subacromial  decompression with rotator cuff repair.     SURGEON: Jennifer Adams MD     ASSISTANT: Thomas Delatorre MD     ANESTHESIA:  General.     PREOPERATIVE DIAGNOSES:  Rotator cuff tear, biceps tendinopathy with  SLAP tear and subacromial impingement.     POSTOPERATIVE DIAGNOSES:  Rotator cuff tear, biceps tendinopathy with  SLAP tear and subacromial impingement. Insurance/Certification information:  PT Insurance Information: [facility]   Physician Information:  Referring Practitioner: Jennifer Adams  Has the plan of care been signed (Y/N):        []  Yes  [x]  No     Date of Patient follow up with Physician: per MD      Is this a Progress Report:     []  Yes  [x]  No        If Yes:  Date Range for reporting period:  Beginning2020  Ending    Progress report will be due (10 Rx or 30 days whichever is less): 7910      Recertification will be due (POC Duration  / 90 days whichever is less): 2020        Visit # Insurance Allowable Auth Required   11 unl 80 []  Yes [x]  No        Functional Scale: UEFS 90%   Date assessed:  2020     Latex Allergy:  [x]NO      []YES  Preferred Language for Healthcare:   [x]English       []other:    Pain level:  1-2/10 2020     SUBJECTIVE: Pt. Reports his shoulder continues to feel good.     OBJECTIVE: progress note     ROM PROM AROM  Comment     L R L R     Flexion   ~100 table slides         Abduction   ~90 table slide         ER             IR             Other  (cervical)             Other                    Strength not performed due to recent surgery L R Comment   Flexion         Abduction         ER         IR         Supraspinatus         Upper Trap         Lower Trap         Mid Trap         Rhomboids         Biceps         Triceps         Horizontal Abduction         Horizontal Adduction         Lats                  Reflexes/Sensation:               [x]? Dermatomes/Myotomes intact               []? Reflexes equal and normal bilaterally               []? Other:     Joint mobility:               []? Normal               [x]? Hypo due to recent surgery but not formally assessed              []? Hyper     Palpation: general TTP due to recent surgery     Functional Mobility/Transfers: modified I with ultra sling R UE     Posture: slouched  With ultra sling, reviewed proper  Fit for sling.     Bandages/Dressings/Incisions: healing well with (-) signs of infection 5/19      Gait: (include devices/WB status): modified I with ultrasling      RESTRICTIONS/PRECAUTIONS: RCR, bicep tenodesis protocols    Exercises/Interventions: DOS 5/6/20, MD appt.  9/8/20  Exercises:  Exercise/Equipment Resistance/Repetitions Other comments   Stretching/PROM     Wand             Pulleys 3'    Cane press 2 x 10                Ll/ld gentle bicep     Wall walk     Sitting SB rollout + wedge                  6 x 10\"      Isometrics     Retraction x30       shrugs x30    Flexion 10 x 5\" Sub max pain free   Extension 10 x 5\" Sub max pain free   External Rotation 10 x 5\" Sub max pain free   Internal Rotation     Biceps     Triceps          PRE's     Flexion     Abduction     External Rotation sidelying - eccentric: 20x     Internal Rotation     Shrugs     EXT     Reverse Flys     Serratus     Horizontal Abd with ER     Biceps     Triceps     Retraction 30x yellow     AA reacher scaptions  30x    Cable Column/Theraband     External Rotation     Internal Rotation     Shrugs     Lats     Ext Flex     Scapular Retraction     BIC     TRIC     PNF          Dynamic Stability          Plyoback          Manual interventions     Prom/stm  15'                Therapeutic Exercise and NMR EXR  [x] (71096) Provided verbal/tactile cueing for activities related to strengthening, flexibility, endurance, ROM  for improvements in scapular, scapulothoracic and UE control with self care, reaching, carrying, lifting, house/yardwork, driving/computer work.    [] (48986) Provided verbal/tactile cueing for activities related to improving balance, coordination, kinesthetic sense, posture, motor skill, proprioception  to assist with  scapular, scapulothoracic and UE control with self care, reaching, carrying, lifting, house/yardwork, driving/computer work. Therapeutic Activities:    [] (01740 or 48070) Provided verbal/tactile cueing for activities related to improving balance, coordination, kinesthetic sense, posture, motor skill, proprioception and motor activation to allow for proper function of scapular, scapulothoracic and UE control with self care, carrying, lifting, driving/computer work.      Home Exercise Program:    [x] (24143) Reviewed/Progressed HEP activities related to strengthening, flexibility, endurance, ROM of scapular, scapulothoracic and UE control with self care, reaching, carrying, lifting, house/yardwork, driving/computer work  [] (82011) Reviewed/Progressed HEP activities related to improving balance, coordination, kinesthetic sense, posture, motor skill, proprioception of scapular, scapulothoracic and UE control with self care, reaching, carrying, lifting, house/yardwork, driving/computer work      Manual Treatments:  PROM / STM / Oscillations-Mobs:  G-I, II, III, IV (PA's, Inf., Post.)  [x] (37187) Provided manual therapy to mobilize soft tissue/joints of cervical/CT, scapular GHJ and UE for the purpose of modulating pain, promoting relaxation,  increasing ROM, reducing/eliminating soft tissue meal prep functional activities without increased symptoms or restriction. [x]? Progressing: []? Met: []? Not Met: []? Adjusted  5. Patient will return to heavy adls yardwork, lifting greater 20+# functional activities without increased symptoms or restriction  [x]? Progressing: []? Met: []? Not Met: []? Adjusted        Overall Progression Towards Functional goals/ Treatment Progress Update:  [] Patient is progressing as expected towards functional goals listed. [] Progression is slowed due to complexities/Impairments listed. [] Progression has been slowed due to co-morbidities. [x] Plan just implemented, too soon to assess goals progression <30days   [] Goals require adjustment due to lack of progress  [] Patient is not progressing as expected and requires additional follow up with physician  [] Other    Prognosis for POC: [x] Good [] Fair  [] Poor      Patient requires continued skilled intervention: [x] Yes  [] No    Treatment/Activity Tolerance:  [x] Patient able to complete treatment  [] Patient limited by fatigue  [] Patient limited by pain     [] Patient limited by other medical complications  [x] Other: 5/19  No complaints with today's program.  ROM is progressing as expected. Incision site is healing well with (-) signs of infection. Pt demonstrated independence with current program and compliance with surgical restrictions. Patient Education:   Reviewed diagnosis, POC, HEP and its importance. HEP instruction:    Access Code: [email or web address]   URL: [email or web address]   Date: 05/12/2020   Prepared by: Modesto Socks     Exercises   · Seated Shoulder Shrugs - 10 reps - 3 sets - 1 - 2 hold - 3x daily - 7x weekly   · Seated Scapular Retraction - 10 reps - 3 sets - 1-2 hold - 3x daily - 7x weekly   · Supported Elbow Flexion Extension PROM - 10 reps - 1 sets - 10 hold - 3x daily - 7x weekly   · Putty Squeezes - 10 reps - 1 sets - 10 hold - 3x daily - 7x weekly   · Seated Cervical Sidebending Stretch - 5 reps - 3 sets - 30 hold - 3x daily - 7x weekly   · Seated Shoulder Flexion Towel Slide at Table Top - 10 reps - 1 sets - 10 hold - 3x daily - 7x weekly   Seated Shoulder Abduction Towel Slide at Table Top - 10 reps - 1 sets - 10 hold - 3x daily - 7x weekly      PLAN: See eval 5/12/2020  [x] Continue per plan of care [] Alter current plan (see comments above)  [] Plan of care initiated [] Hold pending MD visit [] Discharge      Electronically signed by: Bertrand Kamara, PTA  [id number], Ashley Bañuelos, PT, MPT     Note: If patient does not return for scheduled/ recommended follow up visits, this note will serve as a discharge from care along with most recent update on progress.

## 2020-07-23 ENCOUNTER — HOSPITAL ENCOUNTER (OUTPATIENT)
Dept: PHYSICAL THERAPY | Age: 40
Setting detail: THERAPIES SERIES
Discharge: HOME | End: 2020-07-23
Payer: COMMERCIAL

## 2020-07-23 PROCEDURE — 97140 MANUAL THERAPY 1/> REGIONS: CPT | Performed by: SPECIALIST/TECHNOLOGIST

## 2020-07-23 PROCEDURE — 97110 THERAPEUTIC EXERCISES: CPT | Performed by: SPECIALIST/TECHNOLOGIST

## 2020-07-23 NOTE — FLOWSHEET NOTE
The [address] [place] [address] and Sports Rehabilitation  Physical Therapy Daily Treatment Note  Date:  7/15/2020  Patient Name:  Marnie Owens    :  1980  MRN: [id number]  Restrictions/Precautions:    Medical/Treatment Diagnosis Information:  Diagnosis: R rotator cuff tear  Treatment Diagnosis: R shoulder pain       DATE OF PROCEDURE:  2020     OPERATIONS PERFORMED:  Right shoulder arthroscopy with labral  debridement, open subpectoral biceps tenodesis, arthroscopic subacromial  decompression with rotator cuff repair.     SURGEON: Alessandra Deleon MD     ASSISTANT: Krystyna Aceves MD     ANESTHESIA:  General.     PREOPERATIVE DIAGNOSES:  Rotator cuff tear, biceps tendinopathy with  SLAP tear and subacromial impingement.     POSTOPERATIVE DIAGNOSES:  Rotator cuff tear, biceps tendinopathy with  SLAP tear and subacromial impingement. Insurance/Certification information:  PT Insurance Information: [facility]   Physician Information:  Referring Practitioner: Alessandra Deleon  Has the plan of care been signed (Y/N):        []  Yes  [x]  No     Date of Patient follow up with Physician: per MD      Is this a Progress Report:     []  Yes  [x]  No        If Yes:  Date Range for reporting period:  Beginning2020  Ending    Progress report will be due (10 Rx or 30 days whichever is less):       Recertification will be due (POC Duration  / 90 days whichever is less): 2020        Visit # Insurance Allowable Auth Required    unl  []  Yes [x]  No        Functional Scale: UEFS 90%   Date assessed:  2020     Latex Allergy:  [x]NO      []YES  Preferred Language for Healthcare:   [x]English       []other:    Pain level:      SUBJECTIVE: Pt. Reports his shoulder continues to feel good.     OBJECTIVE: progress note     ROM PROM AROM  Comment     L R L R     Flexion   ~100 table slides         Abduction   ~90 table slide         ER             IR           Other  (cervical)             Other                    Strength not performed due to recent surgery L R Comment   Flexion         Abduction         ER         IR         Supraspinatus         Upper Trap         Lower Trap         Mid Trap         Rhomboids         Biceps         Triceps         Horizontal Abduction         Horizontal Adduction         Lats                  Reflexes/Sensation:               [x]? Dermatomes/Myotomes intact               []? Reflexes equal and normal bilaterally               []? Other:     Joint mobility:               []? Normal               [x]? Hypo due to recent surgery but not formally assessed              []? Hyper     Palpation: general TTP due to recent surgery     Functional Mobility/Transfers: modified I with ultra sling R UE     Posture: slouched  With ultra sling, reviewed proper  Fit for sling.     Bandages/Dressings/Incisions: healing well with (-) signs of infection 5/19      Gait: (include devices/WB status): modified I with ultrasling      RESTRICTIONS/PRECAUTIONS: RCR, bicep tenodesis protocols    Exercises/Interventions: DOS 5/6/20, MD appt.  9/8/20  Exercises:  Exercise/Equipment Resistance/Repetitions Other comments   Stretching/PROM     Wand             Pulleys 3'    Cane press  flex 2 x 10  10 x 5\"                Ll/ld gentle bicep     Wall walk     Sitting SB rollout + wedge                  10 x 10\" - independent       Isometrics                  Flexion 10 x 5\" Sub max pain free   Extension 10 x 5\" Sub max pain free   External Rotation 10 x 5\" Sub max pain free   Internal Rotation     Biceps     Triceps          PRE's     Flexion     Abduction     External Rotation sidelying - active: 20x     Internal Rotation     Shrugs     EXT     Reverse Flys     Serratus     Prone row / ext X 20 ea              Retraction 30x red    [place] on floor scaption, CCW,CW 15x ea    Cable Column/Theraband     External Rotation     Internal Rotation     Shrugs     Lats     Ext

## 2020-07-28 ENCOUNTER — HOSPITAL ENCOUNTER (OUTPATIENT)
Dept: PHYSICAL THERAPY | Age: 40
Setting detail: THERAPIES SERIES
Discharge: HOME | End: 2020-07-28
Payer: COMMERCIAL

## 2020-07-28 PROCEDURE — 97110 THERAPEUTIC EXERCISES: CPT | Performed by: SPECIALIST/TECHNOLOGIST

## 2020-07-28 PROCEDURE — 97140 MANUAL THERAPY 1/> REGIONS: CPT | Performed by: SPECIALIST/TECHNOLOGIST

## 2020-07-28 NOTE — FLOWSHEET NOTE
The [address] [place] [address] and Sports Rehabilitation  Physical Therapy Daily Treatment Note  Date:  7/15/2020  Patient Name:  Fawn Lennox    :  1980  MRN: [id number]  Restrictions/Precautions:    Medical/Treatment Diagnosis Information:  Diagnosis: R rotator cuff tear  Treatment Diagnosis: R shoulder pain       DATE OF PROCEDURE:  2020     OPERATIONS PERFORMED:  Right shoulder arthroscopy with labral  debridement, open subpectoral biceps tenodesis, arthroscopic subacromial  decompression with rotator cuff repair.     SURGEON: Ju Page MD     ASSISTANT: Jose Luis Lopez MD     ANESTHESIA:  General.     PREOPERATIVE DIAGNOSES:  Rotator cuff tear, biceps tendinopathy with  SLAP tear and subacromial impingement.     POSTOPERATIVE DIAGNOSES:  Rotator cuff tear, biceps tendinopathy with  SLAP tear and subacromial impingement. Insurance/Certification information:  PT Insurance Information: [facility]   Physician Information:  Referring Practitioner: Ju Page  Has the plan of care been signed (Y/N):        []  Yes  [x]  No     Date of Patient follow up with Physician: per MD      Is this a Progress Report:     []  Yes  [x]  No        If Yes:  Date Range for reporting period:  Beginning2020  Ending    Progress report will be due (10 Rx or 30 days whichever is less):       Recertification will be due (POC Duration  / 90 days whichever is less): 2020        Visit # Insurance Allowable Auth Required   13 unl 80 []  Yes [x]  No        Functional Scale: UEFS 90%   Date assessed:  2020     Latex Allergy:  [x]NO      []YES  Preferred Language for Healthcare:   [x]English       []other:    Pain level:      SUBJECTIVE: Pt. Reports his shoulder continues to feel good.     OBJECTIVE: progress note     ROM PROM AROM  Comment     L R L R     Flexion   ~100 table slides         Abduction   ~90 table slide         ER             IR           Other  (cervical)             Other                    Strength not performed due to recent surgery L R Comment   Flexion         Abduction         ER         IR         Supraspinatus         Upper Trap         Lower Trap         Mid Trap         Rhomboids         Biceps         Triceps         Horizontal Abduction         Horizontal Adduction         Lats                  Reflexes/Sensation:               [x]? Dermatomes/Myotomes intact               []? Reflexes equal and normal bilaterally               []? Other:     Joint mobility:               []? Normal               [x]? Hypo due to recent surgery but not formally assessed              []? Hyper     Palpation: general TTP due to recent surgery     Functional Mobility/Transfers: modified I with ultra sling R UE     Posture: slouched  With ultra sling, reviewed proper  Fit for sling.     Bandages/Dressings/Incisions: healing well with (-) signs of infection 5/19      Gait: (include devices/WB status): modified I with ultrasling      RESTRICTIONS/PRECAUTIONS: RCR, bicep tenodesis protocols    Exercises/Interventions: DOS 5/6/20, MD appt.  9/8/20  Exercises:  Exercise/Equipment Resistance/Repetitions Other comments   Stretching/PROM     Wand             Pulleys 3'    Cane press  flex 2 x 10  10 x 5\"                Ll/ld gentle bicep     Wall walk     Sitting SB rollout + wedge                  10 x 10\" - independent       Isometrics                  Flexion 10 x 5\" Sub max pain free - NV   Extension 10 x 5\" Sub max pain free - NV   External Rotation 10 x 5\" Sub max pain free - NV   Internal Rotation     Biceps     Triceps          PRE's          SA punch  ABC's X 20  X 1     External Rotation sidelying - active: 20x     Internal Rotation     Shrugs     EXT     Reverse Flys     Serratus     Prone row / ext X 20 ea              Retraction 30x red    [place] on floor scaption, CCW,CW 15x ea - NV    Cable Column/Theraband     External Rotation     Internal Rotation Shrugs     Lats     Ext     Flex     Scapular Retraction     BIC     TRIC     PNF          Dynamic Stability          Plyoback          Manual interventions     Prom/stm  23' Bobby PT performed manual therapy and Harvey PTA performed tx.  = 7/28/20               Therapeutic Exercise and NMR EXR  [x] (59942) Provided verbal/tactile cueing for activities related to strengthening, flexibility, endurance, ROM  for improvements in scapular, scapulothoracic and UE control with self care, reaching, carrying, lifting, house/yardwork, driving/computer work.    [] (91938) Provided verbal/tactile cueing for activities related to improving balance, coordination, kinesthetic sense, posture, motor skill, proprioception  to assist with  scapular, scapulothoracic and UE control with self care, reaching, carrying, lifting, house/yardwork, driving/computer work. Therapeutic Activities:    [] (93011 or 50825) Provided verbal/tactile cueing for activities related to improving balance, coordination, kinesthetic sense, posture, motor skill, proprioception and motor activation to allow for proper function of scapular, scapulothoracic and UE control with self care, carrying, lifting, driving/computer work.      Home Exercise Program:    [x] (72889) Reviewed/Progressed HEP activities related to strengthening, flexibility, endurance, ROM of scapular, scapulothoracic and UE control with self care, reaching, carrying, lifting, house/yardwork, driving/computer work  [] (12106) Reviewed/Progressed HEP activities related to improving balance, coordination, kinesthetic sense, posture, motor skill, proprioception of scapular, scapulothoracic and UE control with self care, reaching, carrying, lifting, house/yardwork, driving/computer work      Manual Treatments:  PROM / STM / Oscillations-Mobs:  G-I, II, III, IV (PA's, Inf., Post.)  [x] (10880) Provided manual therapy to mobilize soft tissue/joints of cervical/CT, scapular GHJ and UE for the purpose of modulating pain, promoting relaxation,  increasing ROM, reducing/eliminating soft tissue swelling/inflammation/restriction, improving soft tissue extensibility and allowing for proper ROM for normal function with self care, reaching, carrying, lifting, house/yardwork, driving/computer work    Modalities:  CP 15'    Charges:  Timed Code Treatment Minutes: 40'    Total Treatment Minutes: 40'        [] EVAL (LOW) 38922 (typically 20 minutes face-to-face)  [] EVAL (MOD) 81510 (typically 30 minutes face-to-face)  [] EVAL (HIGH) 88150 (typically 45 minutes face-to-face)  [] RE-EVAL     [x] WG(54782) x  1   [] IONTO  [] NMR (09215)     [] VASO  [x] Manual (12584) x    2  [] Other:  [] TA x      [] Mech Traction (96902)  [] ES(attended) (76879)      [] ES (un) (80789):     GOALS:  Patient stated goal: return to running,golf, playing with young kids    []? Progressing: []? Met: []? Not Met: []? Adjusted     Therapist goals for Patient:   Short Term Goals: To be achieved in: 2 -4 weeks  1. Independent in HEP and progression per patient tolerance, in order to prevent re-injury. []? Progressing: []? Met: []? Not Met: []? Adjusted   2. Patient will have a decrease in pain to facilitate improvement in movement, function, and ADLs as indicated by Functional Deficits. []? Progressing: []? Met: []? Not Met: []? Adjusted     Long Term Goals: To be achieved in: 12 weeks  1. Disability index score of 20% or less for the UEFS to assist with reaching prior level of function. [x]? Progressing: []? Met: []? Not Met: []? Adjusted  2. Patient will demonstrate increased AROM to WNL to allow for proper joint functioning as indicated by patients Functional Deficits. [x]? Progressing: []? Met: []? Not Met: []? Adjusted  3. Patient will demonstrate an increase in strength to good scapular and core control  for UE to allow for proper functional mobility as indicated by patients Functional Deficits. [x]?  Progressing: []? Met: []? Not Met: []? Adjusted  4. Patient will return to light adls personal hygiene, dressing, small meal prep functional activities without increased symptoms or restriction. [x]? Progressing: []? Met: []? Not Met: []? Adjusted  5. Patient will return to heavy adls yardwork, lifting greater 20+# functional activities without increased symptoms or restriction  [x]? Progressing: []? Met: []? Not Met: []? Adjusted        Overall Progression Towards Functional goals/ Treatment Progress Update:  [] Patient is progressing as expected towards functional goals listed. [] Progression is slowed due to complexities/Impairments listed. [] Progression has been slowed due to co-morbidities. [x] Plan just implemented, too soon to assess goals progression <30days   [] Goals require adjustment due to lack of progress  [] Patient is not progressing as expected and requires additional follow up with physician  [] Other    Prognosis for POC: [x] Good [] Fair  [] Poor      Patient requires continued skilled intervention: [x] Yes  [] No    Treatment/Activity Tolerance:  [x] Patient able to complete treatment  [] Patient limited by fatigue  [] Patient limited by pain     [] Patient limited by other medical complications  [] Other:   Pt demonstrated independence with current program and compliance with surgical restrictions. Patient Education:   Reviewed diagnosis, POC, HEP and its importance. HEP instruction:    Access Code: [email or web address]   URL: [email or web address]   Date: 05/12/2020   Prepared by: Bertha Hilton     Exercises   · Seated Shoulder Shrugs - 10 reps - 3 sets - 1 - 2 hold - 3x daily - 7x weekly   · Seated Scapular Retraction - 10 reps - 3 sets - 1-2 hold - 3x daily - 7x weekly   · Supported Elbow Flexion Extension PROM - 10 reps - 1 sets - 10 hold - 3x daily - 7x weekly   · Putty Squeezes - 10 reps - 1 sets - 10 hold - 3x daily - 7x weekly   · Seated Cervical Sidebending Stretch - 5 reps - 3 sets - 30 hold - 3x daily - 7x weekly   · Seated Shoulder Flexion Towel Slide at Table Top - 10 reps - 1 sets - 10 hold - 3x daily - 7x weekly   Seated Shoulder Abduction Towel Slide at Table Top - 10 reps - 1 sets - 10 hold - 3x daily - 7x weekly      PLAN: See eval 5/12/2020  [x] Continue per plan of care [] Alter current plan (see comments above)  [] Plan of care initiated [] Hold pending MD visit [] Discharge      Electronically signed by: Juancarlos Palencia, PTA  [id number], Qi Sanchez, PT, MPT     Note: If patient does not return for scheduled/ recommended follow up visits, this note will serve as a discharge from care along with most recent update on progress.

## 2020-07-30 ENCOUNTER — HOSPITAL ENCOUNTER (OUTPATIENT)
Dept: PHYSICAL THERAPY | Age: 40
Setting detail: THERAPIES SERIES
Discharge: HOME | End: 2020-07-30
Payer: COMMERCIAL

## 2020-07-30 PROCEDURE — 97140 MANUAL THERAPY 1/> REGIONS: CPT | Performed by: PHYSICAL THERAPIST

## 2020-07-30 PROCEDURE — 97110 THERAPEUTIC EXERCISES: CPT | Performed by: PHYSICAL THERAPIST

## 2020-07-31 NOTE — FLOWSHEET NOTE
The [address] [place] [address] and Sports Rehabilitation  Physical Therapy Daily Treatment Note  Date:  2020  Patient Name:  Alisha Peralta    :  1980  MRN: [id number]  Restrictions/Precautions:    Medical/Treatment Diagnosis Information:  Diagnosis: R rotator cuff tear  Treatment Diagnosis: R shoulder pain       DATE OF PROCEDURE:  2020     OPERATIONS PERFORMED:  Right shoulder arthroscopy with labral  debridement, open subpectoral biceps tenodesis, arthroscopic subacromial  decompression with rotator cuff repair.     SURGEON: Akua Newton MD     ASSISTANT: Dedra Tavarez MD     ANESTHESIA:  General.     PREOPERATIVE DIAGNOSES:  Rotator cuff tear, biceps tendinopathy with  SLAP tear and subacromial impingement.     POSTOPERATIVE DIAGNOSES:  Rotator cuff tear, biceps tendinopathy with  SLAP tear and subacromial impingement. Insurance/Certification information:  PT Insurance Information: [facility]   Physician Information:  Referring Practitioner: Akua Newton  Has the plan of care been signed (Y/N):        []  Yes  [x]  No     Date of Patient follow up with Physician: per MD      Is this a Progress Report:     []  Yes  [x]  No        If Yes:  Date Range for reporting period:  Beginning2020  Ending    Progress report will be due (10 Rx or 30 days whichever is less):       Recertification will be due (POC Duration  / 90 days whichever is less): 2020        Visit # Insurance Allowable Auth Required   14 unl  []  Yes [x]  No        Functional Scale: UEFS 90%   Date assessed:  2020     Latex Allergy:  [x]NO      []YES  Preferred Language for Healthcare:   [x]English       []other:    Pain level:      SUBJECTIVE: Pt. Feeling pretty good. .. improvements for sure       OBJECTIVE: progress note     ROM PROM AROM  Comment     L R L R     Flexion   ~100 table slides         Abduction   ~90 table slide         ER             IR           Other  (cervical)             Other                    Strength not performed due to recent surgery L R Comment   Flexion         Abduction         ER         IR         Supraspinatus         Upper Trap         Lower Trap         Mid Trap         Rhomboids         Biceps         Triceps         Horizontal Abduction         Horizontal Adduction         Lats                  Reflexes/Sensation:               [x]? Dermatomes/Myotomes intact               []? Reflexes equal and normal bilaterally               []? Other:     Joint mobility:               []? Normal               [x]? Hypo due to recent surgery but not formally assessed              []? Hyper     Palpation: general TTP due to recent surgery     Functional Mobility/Transfers: modified I with ultra sling R UE     Posture: slouched  With ultra sling, reviewed proper  Fit for sling.     Bandages/Dressings/Incisions: healing well with (-) signs of infection 5/19      Gait: (include devices/WB status): modified I with ultrasling      RESTRICTIONS/PRECAUTIONS: RCR, bicep tenodesis protocols    Exercises/Interventions: DOS 5/6/20, MD appt.  9/8/20  Exercises:  Exercise/Equipment Resistance/Repetitions Other comments   Stretching/PROM     Wand             Pulleys 3'    Cane press  flex 2 x 10  10 x 5\"                Ll/ld gentle bicep     Wall walk     Sitting SB rollout + wedge                  10 x 10\" - independent       Isometrics                  Flexion 10 x 5\" Sub max pain free - NV   Extension 10 x 5\" Sub max pain free - NV   External Rotation 10 x 5\" Sub max pain free - NV   Internal Rotation     Biceps     Triceps          PRE's          SA punch  ABC's X 20  X 1     External Rotation sidelying - active: 20x     Internal Rotation     Shrugs     EXT     Reverse Flys     Serratus     Prone row / ext X 20 ea              Retraction 30x red    [place] on floor scaption, CCW,CW 15x ea - NV    Cable Column/Theraband     External Rotation     Internal Rotation Shrugs     Lats     Ext     Flex     Scapular Retraction     BIC     TRIC     PNF          Dynamic Stability          Plyoback          Manual interventions     Prom/stm  23' Bobby PT performed manual therapy and Harvey PTA performed tx.  = 7/28/20               Therapeutic Exercise and NMR EXR  [x] (40910) Provided verbal/tactile cueing for activities related to strengthening, flexibility, endurance, ROM  for improvements in scapular, scapulothoracic and UE control with self care, reaching, carrying, lifting, house/yardwork, driving/computer work.    [] (82471) Provided verbal/tactile cueing for activities related to improving balance, coordination, kinesthetic sense, posture, motor skill, proprioception  to assist with  scapular, scapulothoracic and UE control with self care, reaching, carrying, lifting, house/yardwork, driving/computer work. Therapeutic Activities:    [] (46698 or 67239) Provided verbal/tactile cueing for activities related to improving balance, coordination, kinesthetic sense, posture, motor skill, proprioception and motor activation to allow for proper function of scapular, scapulothoracic and UE control with self care, carrying, lifting, driving/computer work.      Home Exercise Program:    [x] (10603) Reviewed/Progressed HEP activities related to strengthening, flexibility, endurance, ROM of scapular, scapulothoracic and UE control with self care, reaching, carrying, lifting, house/yardwork, driving/computer work  [] (09669) Reviewed/Progressed HEP activities related to improving balance, coordination, kinesthetic sense, posture, motor skill, proprioception of scapular, scapulothoracic and UE control with self care, reaching, carrying, lifting, house/yardwork, driving/computer work      Manual Treatments:  PROM / STM / Oscillations-Mobs:  G-I, II, III, IV (PA's, Inf., Post.)  [x] (68615) Provided manual therapy to mobilize soft tissue/joints of cervical/CT, scapular GHJ and UE for the purpose of modulating pain, promoting relaxation,  increasing ROM, reducing/eliminating soft tissue swelling/inflammation/restriction, improving soft tissue extensibility and allowing for proper ROM for normal function with self care, reaching, carrying, lifting, house/yardwork, driving/computer work    Modalities:  CP 15'    Charges:  Timed Code Treatment Minutes: 40'    Total Treatment Minutes: 40'        [] EVAL (LOW) 61953 (typically 20 minutes face-to-face)  [] EVAL (MOD) 76313 (typically 30 minutes face-to-face)  [] EVAL (HIGH) 52093 (typically 45 minutes face-to-face)  [] RE-EVAL     [x] NE(12878) x  1   [] IONTO  [] NMR (46516)     [] VASO  [x] Manual (27951) x    2  [] Other:  [] TA x      [] Mech Traction (25353)  [] ES(attended) (64991)      [] ES (un) (59604):     GOALS:  Patient stated goal: return to running,golf, playing with young kids    []? Progressing: []? Met: []? Not Met: []? Adjusted     Therapist goals for Patient:   Short Term Goals: To be achieved in: 2 -4 weeks  1. Independent in HEP and progression per patient tolerance, in order to prevent re-injury. []? Progressing: []? Met: []? Not Met: []? Adjusted   2. Patient will have a decrease in pain to facilitate improvement in movement, function, and ADLs as indicated by Functional Deficits. []? Progressing: []? Met: []? Not Met: []? Adjusted     Long Term Goals: To be achieved in: 12 weeks  1. Disability index score of 20% or less for the UEFS to assist with reaching prior level of function. [x]? Progressing: []? Met: []? Not Met: []? Adjusted  2. Patient will demonstrate increased AROM to WNL to allow for proper joint functioning as indicated by patients Functional Deficits. [x]? Progressing: []? Met: []? Not Met: []? Adjusted  3. Patient will demonstrate an increase in strength to good scapular and core control  for UE to allow for proper functional mobility as indicated by patients Functional Deficits. [x]?  Progressing: []? Met: []? Not Met: []? Adjusted  4. Patient will return to light adls personal hygiene, dressing, small meal prep functional activities without increased symptoms or restriction. [x]? Progressing: []? Met: []? Not Met: []? Adjusted  5. Patient will return to heavy adls yardwork, lifting greater 20+# functional activities without increased symptoms or restriction  [x]? Progressing: []? Met: []? Not Met: []? Adjusted        Overall Progression Towards Functional goals/ Treatment Progress Update:  [] Patient is progressing as expected towards functional goals listed. [] Progression is slowed due to complexities/Impairments listed. [] Progression has been slowed due to co-morbidities. [x] Plan just implemented, too soon to assess goals progression <30days   [] Goals require adjustment due to lack of progress  [] Patient is not progressing as expected and requires additional follow up with physician  [] Other    Prognosis for POC: [x] Good [] Fair  [] Poor      Patient requires continued skilled intervention: [x] Yes  [] No    Treatment/Activity Tolerance:  [x] Patient able to complete treatment  [] Patient limited by fatigue  [] Patient limited by pain     [] Patient limited by other medical complications  [] Other:   Pt demonstrated independence with current program and compliance with surgical restrictions. Patient Education:   Reviewed diagnosis, POC, HEP and its importance. HEP instruction:    Access Code: [email or web address]   URL: [email or web address]   Date: 05/12/2020   Prepared by: Sandy Powell     Exercises   · Seated Shoulder Shrugs - 10 reps - 3 sets - 1 - 2 hold - 3x daily - 7x weekly   · Seated Scapular Retraction - 10 reps - 3 sets - 1-2 hold - 3x daily - 7x weekly   · Supported Elbow Flexion Extension PROM - 10 reps - 1 sets - 10 hold - 3x daily - 7x weekly   · Putty Squeezes - 10 reps - 1 sets - 10 hold - 3x daily - 7x weekly   · Seated Cervical Sidebending Stretch - 5 reps - 3 sets - 30 hold - 3x daily - 7x weekly   · Seated Shoulder Flexion Towel Slide at Table Top - 10 reps - 1 sets - 10 hold - 3x daily - 7x weekly   Seated Shoulder Abduction Towel Slide at Table Top - 10 reps - 1 sets - 10 hold - 3x daily - 7x weekly      PLAN: See eval 5/12/2020  [x] Continue per plan of care [] Alter current plan (see comments above)  [] Plan of care initiated [] Hold pending MD visit [] Discharge      Electronically signed by: Brandie Cevallos, PTA  [id number], Meaghan Dee, PT, MPT     Note: If patient does not return for scheduled/ recommended follow up visits, this note will serve as a discharge from care along with most recent update on progress.

## 2020-08-04 ENCOUNTER — HOSPITAL ENCOUNTER (OUTPATIENT)
Dept: PHYSICAL THERAPY | Age: 40
Setting detail: THERAPIES SERIES
Discharge: HOME | End: 2020-08-04
Payer: COMMERCIAL

## 2020-08-04 PROCEDURE — 97140 MANUAL THERAPY 1/> REGIONS: CPT | Performed by: SPECIALIST/TECHNOLOGIST

## 2020-08-04 PROCEDURE — 97110 THERAPEUTIC EXERCISES: CPT | Performed by: SPECIALIST/TECHNOLOGIST

## 2020-08-04 NOTE — FLOWSHEET NOTE
The [address] [place] [address] and Sports Rehabilitation  Physical Therapy Daily Treatment Note  Date:  7/15/2020  Patient Name:  Javier Cunningham    :  1980  MRN: [id number]  Restrictions/Precautions:    Medical/Treatment Diagnosis Information:  Diagnosis: R rotator cuff tear  Treatment Diagnosis: R shoulder pain       DATE OF PROCEDURE:  2020     OPERATIONS PERFORMED:  Right shoulder arthroscopy with labral  debridement, open subpectoral biceps tenodesis, arthroscopic subacromial  decompression with rotator cuff repair.     SURGEON: Alina Calixto MD     ASSISTANT: Jozef Lindsey MD     ANESTHESIA:  General.     PREOPERATIVE DIAGNOSES:  Rotator cuff tear, biceps tendinopathy with  SLAP tear and subacromial impingement.     POSTOPERATIVE DIAGNOSES:  Rotator cuff tear, biceps tendinopathy with  SLAP tear and subacromial impingement. Insurance/Certification information:  PT Insurance Information: [facility]   Physician Information:  Referring Practitioner: Alina Calixto  Has the plan of care been signed (Y/N):        []  Yes  [x]  No     Date of Patient follow up with Physician: per MD      Is this a Progress Report:     []  Yes  [x]  No        If Yes:  Date Range for reporting period:  Beginning2020  Ending    Progress report will be due (10 Rx or 30 days whichever is less):       Recertification will be due (POC Duration  / 90 days whichever is less): 2020        Visit # Insurance Allowable Auth Required   14 unl 80 []  Yes [x]  No        Functional Scale: UEFS 90%   Date assessed:  2020     Latex Allergy:  [x]NO      []YES  Preferred Language for Healthcare:   [x]English       []other:    Pain level:      SUBJECTIVE: Pt. Reports his shoulder is getting better everyday.     OBJECTIVE: progress note     ROM PROM AROM  Comment     L R L R     Flexion   ~100 table slides         Abduction   ~90 table slide         ER             IR           Other  (cervical)             Other                    Strength not performed due to recent surgery L R Comment   Flexion         Abduction         ER         IR         Supraspinatus         Upper Trap         Lower Trap         Mid Trap         Rhomboids         Biceps         Triceps         Horizontal Abduction         Horizontal Adduction         Lats                  Reflexes/Sensation:               [x]? Dermatomes/Myotomes intact               []? Reflexes equal and normal bilaterally               []? Other:     Joint mobility:               []? Normal               [x]? Hypo due to recent surgery but not formally assessed              []? Hyper     Palpation: general TTP due to recent surgery     Functional Mobility/Transfers: modified I with ultra sling R UE     Posture: slouched  With ultra sling, reviewed proper  Fit for sling.     Bandages/Dressings/Incisions: healing well with (-) signs of infection 5/19      Gait: (include devices/WB status): modified I with ultrasling      RESTRICTIONS/PRECAUTIONS: RCR, bicep tenodesis protocols    Exercises/Interventions: DOS 5/6/20, MD appt.  9/8/20  Exercises:  Exercise/Equipment Resistance/Repetitions Other comments   Stretching/PROM     Wand             Pulleys 3'    Cane press  flex 2 x 10  15 x 5\"                Ll/ld gentle bicep     Wall walk     Sitting SB rollout + wedge                  10 x 10\" - independent       Isometrics                  Internal Rotation     Biceps     Triceps          PRE's          SA punch  ABC's X 30  X 1     External Rotation sidelying - active: 20x     ABC ball on wall Red 7 oz x 1     Shrugs     EXT     Reverse Flys     Serratus     Prone row / ext X 30 ea              Retraction / ext 30x ea red    [place] on floor scaption, CCW,CW 15x ea -     Cable Column/Theraband     External Rotation     Internal Rotation     Shrugs     Lats     Ext     Flex     Scapular Retraction     BIC     TRIC     PNF          Dynamic Stability Plyoback          Manual interventions     Prom/stm  15'               Therapeutic Exercise and NMR EXR  [x] (52739) Provided verbal/tactile cueing for activities related to strengthening, flexibility, endurance, ROM  for improvements in scapular, scapulothoracic and UE control with self care, reaching, carrying, lifting, house/yardwork, driving/computer work.    [] (25713) Provided verbal/tactile cueing for activities related to improving balance, coordination, kinesthetic sense, posture, motor skill, proprioception  to assist with  scapular, scapulothoracic and UE control with self care, reaching, carrying, lifting, house/yardwork, driving/computer work. Therapeutic Activities:    [] (36336 or 23602) Provided verbal/tactile cueing for activities related to improving balance, coordination, kinesthetic sense, posture, motor skill, proprioception and motor activation to allow for proper function of scapular, scapulothoracic and UE control with self care, carrying, lifting, driving/computer work.      Home Exercise Program:    [x] (81352) Reviewed/Progressed HEP activities related to strengthening, flexibility, endurance, ROM of scapular, scapulothoracic and UE control with self care, reaching, carrying, lifting, house/yardwork, driving/computer work  [] (85020) Reviewed/Progressed HEP activities related to improving balance, coordination, kinesthetic sense, posture, motor skill, proprioception of scapular, scapulothoracic and UE control with self care, reaching, carrying, lifting, house/yardwork, driving/computer work      Manual Treatments:  PROM / STM / Oscillations-Mobs:  G-I, II, III, IV (PA's, Inf., Post.)  [x] (46169) Provided manual therapy to mobilize soft tissue/joints of cervical/CT, scapular GHJ and UE for the purpose of modulating pain, promoting relaxation,  increasing ROM, reducing/eliminating soft tissue swelling/inflammation/restriction, improving soft tissue extensibility and allowing for proper ROM for normal function with self care, reaching, carrying, lifting, house/yardwork, driving/computer work    Modalities:  CP 15'    Charges:  Timed Code Treatment Minutes: 40'    Total Treatment Minutes: 40'        [] EVAL (LOW) 95676 (typically 20 minutes face-to-face)  [] EVAL (MOD) 84120 (typically 30 minutes face-to-face)  [] EVAL (HIGH) 11540 (typically 45 minutes face-to-face)  [] RE-EVAL     [x] RE(31580) x  2   [] IONTO  [] NMR (72234)     [] VASO  [x] Manual (67738) x    1  [] Other:  [] TA x      [] Mech Traction (75433)  [] ES(attended) (75733)      [] ES (un) (66190):     GOALS:  Patient stated goal: return to running,golf, playing with young kids    []? Progressing: []? Met: []? Not Met: []? Adjusted     Therapist goals for Patient:   Short Term Goals: To be achieved in: 2 -4 weeks  1. Independent in HEP and progression per patient tolerance, in order to prevent re-injury. []? Progressing: []? Met: []? Not Met: []? Adjusted   2. Patient will have a decrease in pain to facilitate improvement in movement, function, and ADLs as indicated by Functional Deficits. []? Progressing: []? Met: []? Not Met: []? Adjusted     Long Term Goals: To be achieved in: 12 weeks  1. Disability index score of 20% or less for the UEFS to assist with reaching prior level of function. [x]? Progressing: []? Met: []? Not Met: []? Adjusted  2. Patient will demonstrate increased AROM to WNL to allow for proper joint functioning as indicated by patients Functional Deficits. [x]? Progressing: []? Met: []? Not Met: []? Adjusted  3. Patient will demonstrate an increase in strength to good scapular and core control  for UE to allow for proper functional mobility as indicated by patients Functional Deficits. [x]? Progressing: []? Met: []? Not Met: []? Adjusted  4. Patient will return to light adls personal hygiene, dressing, small meal prep functional activities without increased symptoms or restriction. [x]?  Progressing: []? Met: []? Not Met: []? Adjusted  5. Patient will return to heavy adls yardwork, lifting greater 20+# functional activities without increased symptoms or restriction  [x]? Progressing: []? Met: []? Not Met: []? Adjusted        Overall Progression Towards Functional goals/ Treatment Progress Update:  [] Patient is progressing as expected towards functional goals listed. [] Progression is slowed due to complexities/Impairments listed. [] Progression has been slowed due to co-morbidities. [x] Plan just implemented, too soon to assess goals progression <30days   [] Goals require adjustment due to lack of progress  [] Patient is not progressing as expected and requires additional follow up with physician  [] Other    Prognosis for POC: [x] Good [] Fair  [] Poor      Patient requires continued skilled intervention: [x] Yes  [] No    Treatment/Activity Tolerance:  [x] Patient able to complete treatment  [] Patient limited by fatigue  [] Patient limited by pain     [] Patient limited by other medical complications  [] Other:   Pt demonstrated independence with current program and compliance with surgical restrictions. Patient Education:   Reviewed diagnosis, POC, HEP and its importance. HEP instruction:    Access Code: [email or web address]   URL: [employer]/   Date: 05/12/2020   Prepared by: Bertha Hilton     Exercises   · Seated Shoulder Shrugs - 10 reps - 3 sets - 1 - 2 hold - 3x daily - 7x weekly   · Seated Scapular Retraction - 10 reps - 3 sets - 1-2 hold - 3x daily - 7x weekly   · Supported Elbow Flexion Extension PROM - 10 reps - 1 sets - 10 hold - 3x daily - 7x weekly   · Putty Squeezes - 10 reps - 1 sets - 10 hold - 3x daily - 7x weekly   · Seated Cervical Sidebending Stretch - 5 reps - 3 sets - 30 hold - 3x daily - 7x weekly   · Seated Shoulder Flexion Towel Slide at Table Top - 10 reps - 1 sets - 10 hold - 3x daily - 7x weekly   Seated Shoulder Abduction Towel Slide at Table Top - 10 reps - 1 sets - 10 hold - 3x daily - 7x weekly      PLAN: See sharonda 5/12/2020  [x] Continue per plan of care [] Tyrel Moe current plan (see comments above)  [] Plan of care initiated [] Hold pending MD visit [] Discharge      Electronically signed by: Amaris Johnson, PTA  [id number], Francis Mendoza, PT, MPT     Note: If patient does not return for scheduled/ recommended follow up visits, this note will serve as a discharge from care along with most recent update on progress.

## 2020-08-06 ENCOUNTER — HOSPITAL ENCOUNTER (OUTPATIENT)
Dept: PHYSICAL THERAPY | Age: 40
Setting detail: THERAPIES SERIES
Discharge: HOME | End: 2020-08-06
Payer: COMMERCIAL

## 2020-08-06 PROCEDURE — 97110 THERAPEUTIC EXERCISES: CPT | Performed by: PHYSICAL THERAPIST

## 2020-08-06 PROCEDURE — 97140 MANUAL THERAPY 1/> REGIONS: CPT | Performed by: PHYSICAL THERAPIST

## 2020-08-08 NOTE — FLOWSHEET NOTE
Other  (cervical)             Other                    Strength not performed due to recent surgery L R Comment   Flexion         Abduction         ER         IR         Supraspinatus         Upper Trap         Lower Trap         Mid Trap         Rhomboids         Biceps         Triceps         Horizontal Abduction         Horizontal Adduction         Lats                  Reflexes/Sensation:               [x]? Dermatomes/Myotomes intact               []? Reflexes equal and normal bilaterally               []? Other:     Joint mobility:               []? Normal               [x]? Hypo due to recent surgery but not formally assessed              []? Hyper     Palpation: general TTP due to recent surgery     Functional Mobility/Transfers: modified I with ultra sling R UE     Posture: slouched  With ultra sling, reviewed proper  Fit for sling.     Bandages/Dressings/Incisions: healing well with (-) signs of infection 5/19      Gait: (include devices/WB status): modified I with ultrasling      RESTRICTIONS/PRECAUTIONS: RCR, bicep tenodesis protocols    Exercises/Interventions: DOS 5/6/20, MD appt.  9/8/20  Exercises:  Exercise/Equipment Resistance/Repetitions Other comments   Stretching/PROM     Wand             Pulleys 3'    Cane press  flex 2 x 10  15 x 5\"                Ll/ld gentle bicep     Wall walk     Sitting SB rollout + wedge                  10 x 10\" - independent       Isometrics                  Internal Rotation     Biceps     Triceps          PRE's          SA punch  ABC's X 30  X 1     External Rotation sidelying - active: 20x     ABC ball on wall Red 7 oz x 1     Shrugs     EXT     Reverse Flys     Serratus     Prone row / ext X 30 ea              Retraction / ext 30x ea red    [place] on floor scaption, CCW,CW 15x ea -     Cable Column/Theraband     External Rotation     Internal Rotation     Shrugs     Lats     Ext     Flex     Scapular Retraction     BIC     TRIC     PNF          Dynamic Stability Plyoback          Manual interventions     Prom/stm  15'               Therapeutic Exercise and NMR EXR  [x] (57076) Provided verbal/tactile cueing for activities related to strengthening, flexibility, endurance, ROM  for improvements in scapular, scapulothoracic and UE control with self care, reaching, carrying, lifting, house/yardwork, driving/computer work.    [] (53619) Provided verbal/tactile cueing for activities related to improving balance, coordination, kinesthetic sense, posture, motor skill, proprioception  to assist with  scapular, scapulothoracic and UE control with self care, reaching, carrying, lifting, house/yardwork, driving/computer work. Therapeutic Activities:    [] (10689 or 72186) Provided verbal/tactile cueing for activities related to improving balance, coordination, kinesthetic sense, posture, motor skill, proprioception and motor activation to allow for proper function of scapular, scapulothoracic and UE control with self care, carrying, lifting, driving/computer work.      Home Exercise Program:    [x] (94897) Reviewed/Progressed HEP activities related to strengthening, flexibility, endurance, ROM of scapular, scapulothoracic and UE control with self care, reaching, carrying, lifting, house/yardwork, driving/computer work  [] (61621) Reviewed/Progressed HEP activities related to improving balance, coordination, kinesthetic sense, posture, motor skill, proprioception of scapular, scapulothoracic and UE control with self care, reaching, carrying, lifting, house/yardwork, driving/computer work      Manual Treatments:  PROM / STM / Oscillations-Mobs:  G-I, II, III, IV (PA's, Inf., Post.)  [x] (59031) Provided manual therapy to mobilize soft tissue/joints of cervical/CT, scapular GHJ and UE for the purpose of modulating pain, promoting relaxation,  increasing ROM, reducing/eliminating soft tissue swelling/inflammation/restriction, improving soft tissue extensibility and allowing for proper ROM for normal function with self care, reaching, carrying, lifting, house/yardwork, driving/computer work    Modalities:  CP 15'    Charges:  Timed Code Treatment Minutes: 39'   Total Treatment Minutes: 39'        [] EVAL (LOW) 65257 (typically 20 minutes face-to-face)  [] EVAL (MOD) 60538 (typically 30 minutes face-to-face)  [] EVAL (HIGH) 70115 (typically 45 minutes face-to-face)  [] RE-EVAL     [x] GT(36279) x  1   [] IONTO  [] NMR (82270)     [] VASO  [x] Manual (31078) x  2  [] Other:  [] TA x      [] Mech Traction (63743)  [] ES(attended) (43437)      [] ES (un) (90763):     GOALS:  Patient stated goal: return to running,golf, playing with young kids    []? Progressing: []? Met: []? Not Met: []? Adjusted     Therapist goals for Patient:   Short Term Goals: To be achieved in: 2 -4 weeks  1. Independent in HEP and progression per patient tolerance, in order to prevent re-injury. []? Progressing: []? Met: []? Not Met: []? Adjusted   2. Patient will have a decrease in pain to facilitate improvement in movement, function, and ADLs as indicated by Functional Deficits. []? Progressing: []? Met: []? Not Met: []? Adjusted     Long Term Goals: To be achieved in: 12 weeks  1. Disability index score of 20% or less for the UEFS to assist with reaching prior level of function. [x]? Progressing: []? Met: []? Not Met: []? Adjusted  2. Patient will demonstrate increased AROM to WNL to allow for proper joint functioning as indicated by patients Functional Deficits. [x]? Progressing: []? Met: []? Not Met: []? Adjusted  3. Patient will demonstrate an increase in strength to good scapular and core control  for UE to allow for proper functional mobility as indicated by patients Functional Deficits. [x]? Progressing: []? Met: []? Not Met: []? Adjusted  4. Patient will return to light adls personal hygiene, dressing, small meal prep functional activities without increased symptoms or restriction. [x]?  Progressing: []? Met: []? Not Met: []? Adjusted  5. Patient will return to heavy adls yardwork, lifting greater 20+# functional activities without increased symptoms or restriction  [x]? Progressing: []? Met: []? Not Met: []? Adjusted        Overall Progression Towards Functional goals/ Treatment Progress Update:  [] Patient is progressing as expected towards functional goals listed. [] Progression is slowed due to complexities/Impairments listed. [] Progression has been slowed due to co-morbidities. [x] Plan just implemented, too soon to assess goals progression <30days   [] Goals require adjustment due to lack of progress  [] Patient is not progressing as expected and requires additional follow up with physician  [] Other    Prognosis for POC: [x] Good [] Fair  [] Poor      Patient requires continued skilled intervention: [x] Yes  [] No    Treatment/Activity Tolerance:  [x] Patient able to complete treatment  [] Patient limited by fatigue  [] Patient limited by pain     [] Patient limited by other medical complications  [] Other:   Pt demonstrated independence with current program and compliance with surgical restrictions. Patient Education:   Reviewed diagnosis, POC, HEP and its importance. HEP instruction:    Access Code: [email or web address]   URL: [employer]/   Date: 05/12/2020   Prepared by: Kelly Early     Exercises   · Seated Shoulder Shrugs - 10 reps - 3 sets - 1 - 2 hold - 3x daily - 7x weekly   · Seated Scapular Retraction - 10 reps - 3 sets - 1-2 hold - 3x daily - 7x weekly   · Supported Elbow Flexion Extension PROM - 10 reps - 1 sets - 10 hold - 3x daily - 7x weekly   · Putty Squeezes - 10 reps - 1 sets - 10 hold - 3x daily - 7x weekly   · Seated Cervical Sidebending Stretch - 5 reps - 3 sets - 30 hold - 3x daily - 7x weekly   · Seated Shoulder Flexion Towel Slide at Table Top - 10 reps - 1 sets - 10 hold - 3x daily - 7x weekly   Seated Shoulder Abduction Towel Slide at Table Top - 10 reps - 1 sets - 10 hold - 3x daily - 7x weekly      PLAN:   [x] Continue per plan of care [] Evertt Flatten current plan (see comments above)  [] Plan of care initiated [] Hold pending MD visit [] Discharge      Electronically signed by:  Pancho Childress PT, MPT     Note: If patient does not return for scheduled/ recommended follow up visits, this note will serve as a discharge from care along with most recent update on progress.

## 2020-08-13 ENCOUNTER — HOSPITAL ENCOUNTER (OUTPATIENT)
Dept: PHYSICAL THERAPY | Age: 40
Setting detail: THERAPIES SERIES
Discharge: HOME | End: 2020-08-13
Payer: COMMERCIAL

## 2020-08-13 PROCEDURE — 97112 NEUROMUSCULAR REEDUCATION: CPT | Performed by: PHYSICAL THERAPIST

## 2020-08-13 PROCEDURE — 97110 THERAPEUTIC EXERCISES: CPT | Performed by: PHYSICAL THERAPIST

## 2020-08-13 PROCEDURE — 97140 MANUAL THERAPY 1/> REGIONS: CPT | Performed by: PHYSICAL THERAPIST

## 2020-08-15 NOTE — FLOWSHEET NOTE
[address] [facility]  Physical Therapy Daily Treatment Note  Date:  2020  Patient Name:  Rudy Mcbride    :  1980  MRN: [id number]  Restrictions/Precautions:  Diagnosis Information:  Diagnosis: R rotator cuff tear  Treatment Diagnosis: R shoulder pain       DATE OF PROCEDURE:  2020     OPERATIONS PERFORMED:  Right shoulder arthroscopy with labral  debridement, open subpectoral biceps tenodesis, arthroscopic subacromial  decompression with rotator cuff repair.     SURGEON: Samir Bailey MD     ASSISTANT: Christophe Nuñez MD     ANESTHESIA:  General.     PREOPERATIVE DIAGNOSES:  Rotator cuff tear, biceps tendinopathy with  SLAP tear and subacromial impingement.     POSTOPERATIVE DIAGNOSES:  Rotator cuff tear, biceps tendinopathy with  SLAP tear and subacromial impingement.               Insurance/Certification information:  PT Insurance Information: [facility] [place]   Physician Information:  Referring Practitioner: Samir Bailey  Has the plan of care been signed (Y/N):        []  Yes  [x]  No     Date of Patient follow up with Physician: per MD      Is this a Progress Report:     []  Yes  [x]  No        If Yes:  Date Range for reporting period:  Beginning2020  Ending    Progress report will be due (10 Rx or 30 days whichever is less):       Recertification will be due (POC Duration  / 90 days whichever is less): 2020        Visit # Insurance Allowable Auth Required   [address] []  Yes [x]  No        Functional Scale: UEFS 90%   Date assessed:  2020     Latex Allergy:  [x]NO      []YES  Preferred Language for Healthcare:   [x]English       []other:    Pain level:      SUBJECTIVE:     OBJECTIVE: progress note     ROM PROM AROM  Comment     L R L R     Flexion   ~100 table slides         Abduction   ~90 table slide         ER             IR             Other  (cervical)             Other                    Strength not performed due to recent surgery L R Comment   Flexion         Abduction         ER         IR         Supraspinatus         Upper Trap         Lower Trap         Mid Trap         Rhomboids         Biceps         Triceps         Horizontal Abduction         Horizontal Adduction         Lats                  Reflexes/Sensation:               [x]? Dermatomes/Myotomes intact               []? Reflexes equal and normal bilaterally               []? Other:     Joint mobility:               []? Normal               [x]? Hypo due to recent surgery but not formally assessed              []? Hyper     Palpation: general TTP due to recent surgery     Functional Mobility/Transfers: modified I with ultra sling R UE     Posture: slouched  With ultra sling, reviewed proper  Fit for sling.     Bandages/Dressings/Incisions: healing well with (-) signs of infection 5/19      Gait: (include devices/WB status): modified I with ultrasling      RESTRICTIONS/PRECAUTIONS: RCR, bicep tenodesis protocols    Exercises/Interventions: DOS 5/6/20, MD appt.  9/8/20  Exercises:  Exercise/Equipment Resistance/Repetitions Other comments   Stretching/PROM     Wand             Pulleys 3'    Cane press  flex 2 x 10  10 x 5\"                Ll/ld gentle bicep     Wall walk     Sitting SB rollout + wedge                  10 x 10\" - independent                              Biceps     Triceps          PRE's          SA punch  ABC's X 30 mr  X 1     External Rotation sidelying - active: 30x     ABC ball on wall Red 7 oz x 1     Shrugs     EXT     Reverse Flys     Serratus     Prone row / ext X 30 ea    Wall weight shifts  15x     Standing raises  25x    Retraction / ext 30x ea red    [place] on floor scaption, CCW,CW 15x ea -     Cable Column/Theraband 30x blue     External Rotation     Internal Rotation     Shrugs     Lats     Ext     Flex     Scapular Retraction     BIC     TRIC     PNF          Dynamic Stability          Plyoback          Manual interventions     Prom/stm carrying, lifting, house/yardwork, driving/computer work    Modalities:  CP 15'    Charges:  Timed Code Treatment Minutes: 45'   Total Treatment Minutes: 39'        [] EVAL (LOW) 53163 (typically 20 minutes face-to-face)  [] EVAL (MOD) 49327 (typically 30 minutes face-to-face)  [] EVAL (HIGH) 58071 (typically 45 minutes face-to-face)  [] RE-EVAL     [x] LS(34340) x  1   [] IONTO  [x] NMR (55003)  X 1   [] VASO  [x] Manual (01008) x   1 [] Other:  [] TA x      [] Mech Traction (54110)  [] ES(attended) (13906)      [] ES (un) (23678):     GOALS:  Patient stated goal: return to running,golf, playing with young kids    []? Progressing: []? Met: []? Not Met: []? Adjusted     Therapist goals for Patient:   Short Term Goals: To be achieved in: 2 -4 weeks  1. Independent in HEP and progression per patient tolerance, in order to prevent re-injury. []? Progressing: []? Met: []? Not Met: []? Adjusted   2. Patient will have a decrease in pain to facilitate improvement in movement, function, and ADLs as indicated by Functional Deficits. []? Progressing: []? Met: []? Not Met: []? Adjusted     Long Term Goals: To be achieved in: 12 weeks  1. Disability index score of 20% or less for the UEFS to assist with reaching prior level of function. [x]? Progressing: []? Met: []? Not Met: []? Adjusted  2. Patient will demonstrate increased AROM to WNL to allow for proper joint functioning as indicated by patients Functional Deficits. [x]? Progressing: []? Met: []? Not Met: []? Adjusted  3. Patient will demonstrate an increase in strength to good scapular and core control  for UE to allow for proper functional mobility as indicated by patients Functional Deficits. [x]? Progressing: []? Met: []? Not Met: []? Adjusted  4. Patient will return to light adls personal hygiene, dressing, small meal prep functional activities without increased symptoms or restriction. [x]? Progressing: []? Met: []? Not Met: []? Adjusted  5.   Patient will return to heavy adls yardwork, lifting greater 20+# functional activities without increased symptoms or restriction  [x]? Progressing: []? Met: []? Not Met: []? Adjusted        Overall Progression Towards Functional goals/ Treatment Progress Update:  [] Patient is progressing as expected towards functional goals listed. [] Progression is slowed due to complexities/Impairments listed. [] Progression has been slowed due to co-morbidities. [x] Plan just implemented, too soon to assess goals progression <30days   [] Goals require adjustment due to lack of progress  [] Patient is not progressing as expected and requires additional follow up with physician  [] Other    Prognosis for POC: [x] Good [] Fair  [] Poor      Patient requires continued skilled intervention: [x] Yes  [] No    Treatment/Activity Tolerance:  [x] Patient able to complete treatment  [] Patient limited by fatigue  [] Patient limited by pain     [] Patient limited by other medical complications  [] Other:   Pt demonstrated independence with current program and compliance with surgical restrictions. Patient Education:   Reviewed diagnosis, POC, HEP and its importance. HEP instruction:    Access Code: [email or web address]   URL: [email or web address]   Date: 05/12/2020   Prepared by: Cralos Minor     Exercises   · Seated Shoulder Shrugs - 10 reps - 3 sets - 1 - 2 hold - 3x daily - 7x weekly   · Seated Scapular Retraction - 10 reps - 3 sets - 1-2 hold - 3x daily - 7x weekly   · Supported Elbow Flexion Extension PROM - 10 reps - 1 sets - 10 hold - 3x daily - 7x weekly   · Putty Squeezes - 10 reps - 1 sets - 10 hold - 3x daily - 7x weekly   · Seated Cervical Sidebending Stretch - 5 reps - 3 sets - 30 hold - 3x daily - 7x weekly   · Seated Shoulder Flexion Towel Slide at Table Top - 10 reps - 1 sets - 10 hold - 3x daily - 7x weekly   Seated Shoulder Abduction Towel Slide at Table Top - 10 reps - 1 sets - 10 hold - 3x daily - 7x weekly      PLAN:   [x] Continue per plan of care [] Alter current plan (see comments above)  [] Plan of care initiated [] Hold pending MD visit [] Discharge      Electronically signed by:  Vielka Owen, PT, MPT     Note: If patient does not return for scheduled/ recommended follow up visits, this note will serve as a discharge from care along with most recent update on progress.

## 2020-08-19 ENCOUNTER — HOSPITAL ENCOUNTER (OUTPATIENT)
Dept: PHYSICAL THERAPY | Age: 40
Setting detail: THERAPIES SERIES
Discharge: HOME | End: 2020-08-19
Payer: COMMERCIAL

## 2020-08-19 PROCEDURE — 97112 NEUROMUSCULAR REEDUCATION: CPT | Performed by: PHYSICAL THERAPIST

## 2020-08-19 PROCEDURE — 97110 THERAPEUTIC EXERCISES: CPT | Performed by: PHYSICAL THERAPIST

## 2020-08-19 PROCEDURE — 97140 MANUAL THERAPY 1/> REGIONS: CPT | Performed by: PHYSICAL THERAPIST

## 2020-08-20 ENCOUNTER — APPOINTMENT (OUTPATIENT)
Dept: PHYSICAL THERAPY | Age: 40
End: 2020-08-20
Payer: COMMERCIAL

## 2020-08-21 NOTE — FLOWSHEET NOTE
The [address] [place] [address] and Sports Rehabilitation  Physical Therapy Daily Treatment Note  Date:  2020  Patient Name:  Kashif Nazario    :  1980  MRN: [id number]  Restrictions/Precautions:  Diagnosis Information:  Diagnosis: R rotator cuff tear  Treatment Diagnosis: R shoulder pain       DATE OF PROCEDURE:  2020     OPERATIONS PERFORMED:  Right shoulder arthroscopy with labral  debridement, open subpectoral biceps tenodesis, arthroscopic subacromial  decompression with rotator cuff repair.     SURGEON: Nash Delaney MD     ASSISTANT: Pradeep Garcia MD     ANESTHESIA:  General.     PREOPERATIVE DIAGNOSES:  Rotator cuff tear, biceps tendinopathy with  SLAP tear and subacromial impingement.     POSTOPERATIVE DIAGNOSES:  Rotator cuff tear, biceps tendinopathy with  SLAP tear and subacromial impingement. Insurance/Certification information:  PT Insurance Information: [facility]   Physician Information:  Referring Practitioner: Nash Delaney  Has the plan of care been signed (Y/N):        []  Yes  [x]  No     Date of Patient follow up with Physician: per MD      Is this a Progress Report:     []  Yes  [x]  No        If Yes:  Date Range for reporting period:  Beginning2020  Ending    Progress report will be due (10 Rx or 30 days whichever is less): 2567      Recertification will be due (POC Duration  / 90 days whichever is less): 2020        Visit # Insurance Allowable Auth Required    unl 80 []  Yes [x]  No        Functional Scale: UEFS 90%   Date assessed:  2020     Latex Allergy:  [x]NO      []YES  Preferred Language for Healthcare:   [x]English       []other:    Pain level:      SUBJECTIVE: \"I feel ok. .. Yadiel Hickman  pretty good actually\"    OBJECTIVE:  4/5 safe zone     ROM PROM AROM  Comment     L R L R     Flexion   ~100 table slides         Abduction   ~90 table slide         ER             IR             Other  (cervical)             Other                    Strength not performed due to recent surgery L R Comment   Flexion         Abduction         ER         IR         Supraspinatus         Upper Trap         Lower Trap         Mid Trap         Rhomboids         Biceps         Triceps         Horizontal Abduction         Horizontal Adduction         Lats                  Reflexes/Sensation:               [x]? Dermatomes/Myotomes intact               []? Reflexes equal and normal bilaterally               []? Other:     Joint mobility:               []? Normal               [x]? Hypo due to recent surgery but not formally assessed              []? Hyper     Palpation: general TTP due to recent surgery     Functional Mobility/Transfers: modified I with ultra sling R UE     Posture: slouched  With ultra sling, reviewed proper  Fit for sling.     Bandages/Dressings/Incisions: healing well with (-) signs of infection 5/19      Gait: (include devices/WB status): modified I with ultrasling      RESTRICTIONS/PRECAUTIONS: RCR, bicep tenodesis protocols    Exercises/Interventions: DOS 5/6/20, MD appt.  9/8/20  Exercises:  Exercise/Equipment Resistance/Repetitions Other comments   Stretching/PROM     Wand             Pulleys 3'    Cane press  flex 2 x 10  10 x 5\"                Ll/ld gentle bicep     Wall walk     Sitting SB rollout + wedge                  10 x 10\" - independent                              Biceps     Triceps          PRE's          SA punch  ABC's X 30 mr  X 1     External Rotation sidelying - active: 30x     ABC ball on wall Red 7 oz x 1     Shrugs     EXT     Reverse Flys     Serratus     Prone row / ext X 30 ea    Wall weight shifts  15x     Standing raises  25x    Retraction / ext 30x ea red    [place] on floor scaption, CCW,CW 15x ea -     Cable Column/Theraband 30x blue     External Rotation     Internal Rotation     Shrugs     Lats     Ext     Flex     Scapular Retraction     BIC     TRIC     PNF     Quadruped weight shifts  20x     Dynamic Stability          Plyoback          Manual interventions     Prom/stm  15'               Therapeutic Exercise and NMR EXR  [x] (05718) Provided verbal/tactile cueing for activities related to strengthening, flexibility, endurance, ROM  for improvements in scapular, scapulothoracic and UE control with self care, reaching, carrying, lifting, house/yardwork, driving/computer work.    [] (40842) Provided verbal/tactile cueing for activities related to improving balance, coordination, kinesthetic sense, posture, motor skill, proprioception  to assist with  scapular, scapulothoracic and UE control with self care, reaching, carrying, lifting, house/yardwork, driving/computer work. Therapeutic Activities:    [] (29159 or 45996) Provided verbal/tactile cueing for activities related to improving balance, coordination, kinesthetic sense, posture, motor skill, proprioception and motor activation to allow for proper function of scapular, scapulothoracic and UE control with self care, carrying, lifting, driving/computer work.      Home Exercise Program:    [x] (84859) Reviewed/Progressed HEP activities related to strengthening, flexibility, endurance, ROM of scapular, scapulothoracic and UE control with self care, reaching, carrying, lifting, house/yardwork, driving/computer work  [] (96098) Reviewed/Progressed HEP activities related to improving balance, coordination, kinesthetic sense, posture, motor skill, proprioception of scapular, scapulothoracic and UE control with self care, reaching, carrying, lifting, house/yardwork, driving/computer work      Manual Treatments:  PROM / STM / Oscillations-Mobs:  G-I, II, III, IV (PA's, Inf., Post.)  [x] (24272) Provided manual therapy to mobilize soft tissue/joints of cervical/CT, scapular GHJ and UE for the purpose of modulating pain, promoting relaxation,  increasing ROM, reducing/eliminating soft tissue swelling/inflammation/restriction, improving soft tissue extensibility and allowing for proper ROM for normal function with self care, reaching, carrying, lifting, house/yardwork, driving/computer work    Modalities:  CP 15'    Charges:  Timed Code Treatment Minutes: 45'   Total Treatment Minutes: 39'        [] EVAL (LOW) 00917 (typically 20 minutes face-to-face)  [] EVAL (MOD) 51633 (typically 30 minutes face-to-face)  [] EVAL (HIGH) 92433 (typically 45 minutes face-to-face)  [] RE-EVAL     [x] KU(30410) x  1   [] IONTO  [x] NMR (45064)  X 1   [] VASO  [x] Manual (42857) x   1 [] Other:  [] TA x      [] Mech Traction (59684)  [] ES(attended) (12134)      [] ES (un) (71157):     GOALS:  Patient stated goal: return to running,golf, playing with young kids    []? Progressing: []? Met: []? Not Met: []? Adjusted     Therapist goals for Patient:   Short Term Goals: To be achieved in: 2 -4 weeks  1. Independent in HEP and progression per patient tolerance, in order to prevent re-injury. [x]? Progressing: []? Met: []? Not Met: []? Adjusted   2. Patient will have a decrease in pain to facilitate improvement in movement, function, and ADLs as indicated by Functional Deficits. [x]? Progressing: []? Met: []? Not Met: []? Adjusted     Long Term Goals: To be achieved in: 12 weeks  1. Disability index score of 20% or less for the UEFS to assist with reaching prior level of function. [x]? Progressing: []? Met: []? Not Met: []? Adjusted  2. Patient will demonstrate increased AROM to WNL to allow for proper joint functioning as indicated by patients Functional Deficits. [x]? Progressing: []? Met: []? Not Met: []? Adjusted  3. Patient will demonstrate an increase in strength to good scapular and core control  for UE to allow for proper functional mobility as indicated by patients Functional Deficits. [x]? Progressing: []? Met: []? Not Met: []? Adjusted  4.  Patient will return to light adls personal hygiene, dressing, small meal prep functional activities without increased symptoms or restriction. [x]? Progressing: []? Met: []? Not Met: []? Adjusted  5. Patient will return to heavy adls yardwork, lifting greater 20+# functional activities without increased symptoms or restriction  [x]? Progressing: []? Met: []? Not Met: []? Adjusted        Overall Progression Towards Functional goals/ Treatment Progress Update:  [] Patient is progressing as expected towards functional goals listed. [] Progression is slowed due to complexities/Impairments listed. [] Progression has been slowed due to co-morbidities. [x] Plan just implemented, too soon to assess goals progression <30days   [] Goals require adjustment due to lack of progress  [] Patient is not progressing as expected and requires additional follow up with physician  [] Other    Prognosis for POC: [x] Good [] Fair  [] Poor      Patient requires continued skilled intervention: [x] Yes  [] No    Treatment/Activity Tolerance:  [x] Patient able to complete treatment  [] Patient limited by fatigue  [] Patient limited by pain     [] Patient limited by other medical complications  [] Other:   Pt demonstrated independence with current program and compliance with surgical restrictions. Patient Education:   Reviewed diagnosis, POC, HEP and its importance. HEP instruction:    Access Code: [email or web address]   URL: [email or web address]   Date: 05/12/2020   Prepared by: Sabino Guerrero     Exercises   · Seated Shoulder Shrugs - 10 reps - 3 sets - 1 - 2 hold - 3x daily - 7x weekly   · Seated Scapular Retraction - 10 reps - 3 sets - 1-2 hold - 3x daily - 7x weekly   · Supported Elbow Flexion Extension PROM - 10 reps - 1 sets - 10 hold - 3x daily - 7x weekly   · Putty Squeezes - 10 reps - 1 sets - 10 hold - 3x daily - 7x weekly   · Seated Cervical Sidebending Stretch - 5 reps - 3 sets - 30 hold - 3x daily - 7x weekly   · Seated Shoulder Flexion Towel Slide at Table Top - 10 reps - 1 sets - 10 hold - 3x daily - 7x weekly   Seated Shoulder Abduction Towel Slide at Table Top - 10 reps - 1 sets - 10 hold - 3x daily - 7x weekly      PLAN:   [x] Continue per plan of care [] Alter current plan (see comments above)  [] Plan of care initiated [] Hold pending MD visit [] Discharge      Electronically signed by:  Ashley Bañuelos PT, MPT     Note: If patient does not return for scheduled/ recommended follow up visits, this note will serve as a discharge from care along with most recent update on progress.

## 2020-08-25 ENCOUNTER — HOSPITAL ENCOUNTER (OUTPATIENT)
Dept: PHYSICAL THERAPY | Age: 40
Setting detail: THERAPIES SERIES
Discharge: HOME | End: 2020-08-25
Payer: COMMERCIAL

## 2020-08-25 PROCEDURE — 97140 MANUAL THERAPY 1/> REGIONS: CPT | Performed by: PHYSICAL THERAPIST

## 2020-08-25 PROCEDURE — 97112 NEUROMUSCULAR REEDUCATION: CPT | Performed by: PHYSICAL THERAPIST

## 2020-08-25 PROCEDURE — 97110 THERAPEUTIC EXERCISES: CPT | Performed by: PHYSICAL THERAPIST

## 2020-08-26 ENCOUNTER — HOSPITAL ENCOUNTER (OUTPATIENT)
Dept: PHYSICAL THERAPY | Age: 40
Setting detail: THERAPIES SERIES
Discharge: HOME | End: 2020-08-26
Payer: COMMERCIAL

## 2020-08-26 PROCEDURE — 97140 MANUAL THERAPY 1/> REGIONS: CPT | Performed by: PHYSICAL THERAPIST

## 2020-08-26 PROCEDURE — 97110 THERAPEUTIC EXERCISES: CPT | Performed by: PHYSICAL THERAPIST

## 2020-08-26 PROCEDURE — 97112 NEUROMUSCULAR REEDUCATION: CPT | Performed by: PHYSICAL THERAPIST

## 2020-08-27 ENCOUNTER — APPOINTMENT (OUTPATIENT)
Dept: PHYSICAL THERAPY | Age: 40
End: 2020-08-27
Payer: COMMERCIAL

## 2020-08-27 NOTE — FLOWSHEET NOTE
The [address] [place] [address] and Sports Rehabilitation  Physical Therapy Daily Treatment Note  Date:  2020  Patient Name:  Lazara Estrada    :  1980  MRN: [id number]  Restrictions/Precautions:  Diagnosis Information:  Diagnosis: R rotator cuff tear  Treatment Diagnosis: R shoulder pain       DATE OF PROCEDURE:  2020     OPERATIONS PERFORMED:  Right shoulder arthroscopy with labral  debridement, open subpectoral biceps tenodesis, arthroscopic subacromial  decompression with rotator cuff repair.     SURGEON: Esvin Santillan MD     ASSISTANT: Alex Henry MD     ANESTHESIA:  General.     PREOPERATIVE DIAGNOSES:  Rotator cuff tear, biceps tendinopathy with  SLAP tear and subacromial impingement.     POSTOPERATIVE DIAGNOSES:  Rotator cuff tear, biceps tendinopathy with  SLAP tear and subacromial impingement.               Insurance/Certification information:  PT Insurance Information: R UNL 80/20  Physician Information:  Referring Practitioner: Esvin Santillan  Has the plan of care been signed (Y/N):        []  Yes  [x]  No     Date of Patient follow up with Physician: per MD      Is this a Progress Report:     []  Yes  [x]  No        If Yes:  Date Range for reporting period:  Beginning2020  Ending    Progress report will be due (10 Rx or 30 days whichever is less):       Recertification will be due (POC Duration  / 90 days whichever is less): 2020        Visit # Insurance Allowable Auth Required   20 unl 80/20 []  Yes [x]  No        Functional Scale: UEFS 90%   Date assessed:  2020     Latex Allergy:  [x]NO      []YES  Preferred Language for Healthcare:   [x]English       []other:    Pain level:      SUBJECTIVE: progress note     OBJECTIVE:   Progress note     ROM PROM AROM  Comment     L R L R     Flexion   ~100 table slides         Abduction   ~90 table slide         ER             IR             Other  (cervical)             Other                    Strength not performed due to recent surgery L R Comment   Flexion         Abduction         ER         IR         Supraspinatus         Upper Trap         Lower Trap         Mid Trap         Rhomboids         Biceps         Triceps         Horizontal Abduction         Horizontal Adduction         Lats                  Reflexes/Sensation:               [x]? Dermatomes/Myotomes intact               []? Reflexes equal and normal bilaterally               []? Other:     Joint mobility:               []? Normal               [x]? Hypo due to recent surgery but not formally assessed              []? Hyper     Palpation: general TTP due to recent surgery     Functional Mobility/Transfers: modified I with ultra sling R UE     Posture: slouched  With ultra sling, reviewed proper  Fit for sling.     Bandages/Dressings/Incisions: healing well with (-) signs of infection 5/19      Gait: (include devices/WB status): modified I with ultrasling      RESTRICTIONS/PRECAUTIONS: RCR, bicep tenodesis protocols    Exercises/Interventions: DOS 5/6/20, MD appt.  9/8/20  Exercises:  Exercise/Equipment Resistance/Repetitions Other comments   Stretching/PROM     Wand             Pulleys 3'    Cane press  flex 2 x 10  10 x 5\"                Ll/ld gentle bicep     Wall walk     Sitting SB rollout + wedge                  10 x 10\" - independent                              Biceps     Triceps          PRE's          SA punch  ABC's X 30 mr  X 1     External Rotation sidelying - active: 30x     ABC ball on wall Red 7 oz x 1     Shrugs     EXT     Reverse Flys     Serratus     Prone row / ext X 30 ea    Wall weight shifts  15x     Standing raises  25x    Retraction / ext 30x ea red    [place] on floor scaption, CCW,CW 15x ea -     Cable Column/Theraband 30x blue     External Rotation     Internal Rotation     Shrugs     Lats     Ext     Flex     Scapular Retraction     BIC     TRIC     PNF     Quadruped weight shifts  20x     Dynamic Stability          Plyoback Manual interventions     Prom/stm  15'               Therapeutic Exercise and NMR EXR  [x] (03756) Provided verbal/tactile cueing for activities related to strengthening, flexibility, endurance, ROM  for improvements in scapular, scapulothoracic and UE control with self care, reaching, carrying, lifting, house/yardwork, driving/computer work.    [] (94898) Provided verbal/tactile cueing for activities related to improving balance, coordination, kinesthetic sense, posture, motor skill, proprioception  to assist with  scapular, scapulothoracic and UE control with self care, reaching, carrying, lifting, house/yardwork, driving/computer work. Therapeutic Activities:    [] (66831 or 23040) Provided verbal/tactile cueing for activities related to improving balance, coordination, kinesthetic sense, posture, motor skill, proprioception and motor activation to allow for proper function of scapular, scapulothoracic and UE control with self care, carrying, lifting, driving/computer work.      Home Exercise Program:    [x] (21588) Reviewed/Progressed HEP activities related to strengthening, flexibility, endurance, ROM of scapular, scapulothoracic and UE control with self care, reaching, carrying, lifting, house/yardwork, driving/computer work  [] (52010) Reviewed/Progressed HEP activities related to improving balance, coordination, kinesthetic sense, posture, motor skill, proprioception of scapular, scapulothoracic and UE control with self care, reaching, carrying, lifting, house/yardwork, driving/computer work      Manual Treatments:  PROM / STM / Oscillations-Mobs:  G-I, II, III, IV (PA's, Inf., Post.)  [x] (68553) Provided manual therapy to mobilize soft tissue/joints of cervical/CT, scapular GHJ and UE for the purpose of modulating pain, promoting relaxation,  increasing ROM, reducing/eliminating soft tissue swelling/inflammation/restriction, improving soft tissue extensibility and allowing for proper ROM for normal function with self care, reaching, carrying, lifting, house/yardwork, driving/computer work    Modalities:  CP 15'    Charges:  Timed Code Treatment Minutes: 45'   Total Treatment Minutes: 39'        [] EVAL (LOW) 63918 (typically 20 minutes face-to-face)  [] EVAL (MOD) 69303 (typically 30 minutes face-to-face)  [] EVAL (HIGH) 56467 (typically 45 minutes face-to-face)  [] RE-EVAL     [x] CW(81326) x  1   [] IONTO  [x] NMR (10089)  X 1   [] VASO  [x] Manual (13864) x   1 [] Other:  [] TA x      [] Mech Traction (44891)  [] ES(attended) (33327)      [] ES (un) (94709):     GOALS:  Patient stated goal: return to running,golf, playing with young kids    []? Progressing: []? Met: []? Not Met: []? Adjusted     Therapist goals for Patient:   Short Term Goals: To be achieved in: 2 -4 weeks  1. Independent in HEP and progression per patient tolerance, in order to prevent re-injury. [x]? Progressing: []? Met: []? Not Met: []? Adjusted   2. Patient will have a decrease in pain to facilitate improvement in movement, function, and ADLs as indicated by Functional Deficits. [x]? Progressing: []? Met: []? Not Met: []? Adjusted     Long Term Goals: To be achieved in: 12 weeks  1. Disability index score of 20% or less for the UEFS to assist with reaching prior level of function. [x]? Progressing: []? Met: []? Not Met: []? Adjusted  2. Patient will demonstrate increased AROM to WNL to allow for proper joint functioning as indicated by patients Functional Deficits. [x]? Progressing: []? Met: []? Not Met: []? Adjusted  3. Patient will demonstrate an increase in strength to good scapular and core control  for UE to allow for proper functional mobility as indicated by patients Functional Deficits. [x]? Progressing: []? Met: []? Not Met: []? Adjusted  4. Patient will return to light adls personal hygiene, dressing, small meal prep functional activities without increased symptoms or restriction. [x]?  Progressing: []? Met: []? Not Met: []? Adjusted  5. Patient will return to heavy adls yardwork, lifting greater 20+# functional activities without increased symptoms or restriction  [x]? Progressing: []? Met: []? Not Met: []? Adjusted        Overall Progression Towards Functional goals/ Treatment Progress Update:  [] Patient is progressing as expected towards functional goals listed. [] Progression is slowed due to complexities/Impairments listed. [] Progression has been slowed due to co-morbidities. [x] Plan just implemented, too soon to assess goals progression <30days   [] Goals require adjustment due to lack of progress  [] Patient is not progressing as expected and requires additional follow up with physician  [] Other    Prognosis for POC: [x] Good [] Fair  [] Poor      Patient requires continued skilled intervention: [x] Yes  [] No    Treatment/Activity Tolerance:  [x] Patient able to complete treatment  [] Patient limited by fatigue  [] Patient limited by pain     [] Patient limited by other medical complications  [] Other:   Pt demonstrated independence with current program and compliance with surgical restrictions. Patient Education:   Reviewed diagnosis, POC, HEP and its importance. HEP instruction:    Access Code: [email or web address]   URL: [email or web address]   Date: 05/12/2020   Prepared by: Victor Hugo Severino     Exercises   · Seated Shoulder Shrugs - 10 reps - 3 sets - 1 - 2 hold - 3x daily - 7x weekly   · Seated Scapular Retraction - 10 reps - 3 sets - 1-2 hold - 3x daily - 7x weekly   · Supported Elbow Flexion Extension PROM - 10 reps - 1 sets - 10 hold - 3x daily - 7x weekly   · Putty Squeezes - 10 reps - 1 sets - 10 hold - 3x daily - 7x weekly   · Seated Cervical Sidebending Stretch - 5 reps - 3 sets - 30 hold - 3x daily - 7x weekly   · Seated Shoulder Flexion Towel Slide at Table Top - 10 reps - 1 sets - 10 hold - 3x daily - 7x weekly   Seated Shoulder Abduction Towel Slide at Table Top - 10 reps - 1 sets - 10 hold - 3x daily - 7x weekly      PLAN:   [x] Continue per plan of care [] Alter current plan (see comments above)  [] Plan of care initiated [] Hold pending MD visit [] Discharge      Electronically signed by:  Ghada Hale, PT, MPT     Note: If patient does not return for scheduled/ recommended follow up visits, this note will serve as a discharge from care along with most recent update on progress.

## 2020-08-28 NOTE — FLOWSHEET NOTE
The [address] [place] [address] and [facility]  Physical Therapy Daily Treatment Note  Date:  2020  Patient Name:  Eligio Urbina    :  1980  MRN: [id number]  Restrictions/Precautions:  Diagnosis Information:  Diagnosis: R rotator cuff tear  Treatment Diagnosis: R shoulder pain       DATE OF PROCEDURE:  2020     OPERATIONS PERFORMED:  Right shoulder arthroscopy with labral  debridement, open subpectoral biceps tenodesis, arthroscopic subacromial  decompression with rotator cuff repair.     SURGEON: Neto Galeas MD     ASSISTANT: Fide Liao MD     ANESTHESIA:  General.     PREOPERATIVE DIAGNOSES:  Rotator cuff tear, biceps tendinopathy with  SLAP tear and subacromial impingement.     POSTOPERATIVE DIAGNOSES:  Rotator cuff tear, biceps tendinopathy with  SLAP tear and subacromial impingement.               Insurance/Certification information:  PT Insurance Information: [facility] UNL 80  Physician Information:  Referring Practitioner: Neto Galeas  Has the plan of care been signed (Y/N):        []  Yes  [x]  No     Date of Patient follow up with Physician: per MD      Is this a Progress Report:     []  Yes  [x]  No        If Yes:  Date Range for reporting period:  Beginning2020  Ending    Progress report will be due (10 Rx or 30 days whichever is less):       Recertification will be due (POC Duration  / 90 days whichever is less): 2020        Visit # Insurance Allowable Auth Required   21 unl 80/20 []  Yes [x]  No        Functional Scale: UEFS 90%   Date assessed:  2020     Latex Allergy:  [x]NO      []YES  Preferred Language for Healthcare:   [x]English       []other:    Pain level:      SUBJECTIVE: progress note     OBJECTIVE:   Progress note     ROM PROM AROM  Comment     L R L R     Flexion   ~100 table slides         Abduction   ~90 table slide         ER             IR             Other  (cervical)             Other                    Strength not performed due to recent surgery L R Comment   Flexion         Abduction         ER         IR         Supraspinatus         Upper Trap         Lower Trap         Mid Trap         Rhomboids         Biceps         Triceps         Horizontal Abduction         Horizontal Adduction         Lats                  Reflexes/Sensation:               [x]? Dermatomes/Myotomes intact               []? Reflexes equal and normal bilaterally               []? Other:     Joint mobility:               []? Normal               [x]? Hypo due to recent surgery but not formally assessed              []? Hyper     Palpation: general TTP due to recent surgery     Functional Mobility/Transfers: modified I with ultra sling R UE     Posture: slouched  With ultra sling, reviewed proper  Fit for sling.     Bandages/Dressings/Incisions: healing well with (-) signs of infection 5/19      Gait: (include devices/WB status): modified I with ultrasling      RESTRICTIONS/PRECAUTIONS: RCR, bicep tenodesis protocols    Exercises/Interventions: DOS 5/6/20, MD appt.  9/8/20  Exercises:  Exercise/Equipment Resistance/Repetitions Other comments   Stretching/PROM     Wand             Pulleys 3'                     Ll/ld gentle bicep     Wall walk     Sitting SB rollout + wedge                  5 x 10\" - independent                              Biceps     Triceps          PRE's     Supine raises  1# - 25x     SA punch  ABC's X 30 mr  X 1 1#    External Rotation sidelying - active: 30x     ABC ball on wall Red 7 oz x 1     Shrugs     EXT     Reverse Flys     Serratus     Prone row / ext/m trap  X 30 ea; 25x 1/2     Wall mini push ups 15x     Standing raises  25x    Retraction / ext 30x ea red    [place] on floor scaption, CCW,CW 15x ea -     Cable Column/Theraband 30x blue     External Rotation     Internal Rotation     Shrugs     Lats     Ext     Flex     Scapular Retraction     BIC     TRIC     PNF     Quadruped weight shifts     Mini push ups on bosu 20x 20x     Dynamic Stability          Plyoback          Manual interventions     Prom/stm  15'               Therapeutic Exercise and NMR EXR  [x] (46238) Provided verbal/tactile cueing for activities related to strengthening, flexibility, endurance, ROM  for improvements in scapular, scapulothoracic and UE control with self care, reaching, carrying, lifting, house/yardwork, driving/computer work.    [] (16602) Provided verbal/tactile cueing for activities related to improving balance, coordination, kinesthetic sense, posture, motor skill, proprioception  to assist with  scapular, scapulothoracic and UE control with self care, reaching, carrying, lifting, house/yardwork, driving/computer work. Therapeutic Activities:    [] (88192 or 15209) Provided verbal/tactile cueing for activities related to improving balance, coordination, kinesthetic sense, posture, motor skill, proprioception and motor activation to allow for proper function of scapular, scapulothoracic and UE control with self care, carrying, lifting, driving/computer work.      Home Exercise Program:    [x] (48918) Reviewed/Progressed HEP activities related to strengthening, flexibility, endurance, ROM of scapular, scapulothoracic and UE control with self care, reaching, carrying, lifting, house/yardwork, driving/computer work  [] (74032) Reviewed/Progressed HEP activities related to improving balance, coordination, kinesthetic sense, posture, motor skill, proprioception of scapular, scapulothoracic and UE control with self care, reaching, carrying, lifting, house/yardwork, driving/computer work      Manual Treatments:  PROM / STM / Oscillations-Mobs:  G-I, II, III, IV (PA's, Inf., Post.)  [x] (50877) Provided manual therapy to mobilize soft tissue/joints of cervical/CT, scapular GHJ and UE for the purpose of modulating pain, promoting relaxation,  increasing ROM, reducing/eliminating soft tissue swelling/inflammation/restriction, improving soft tissue extensibility and allowing for proper ROM for normal function with self care, reaching, carrying, lifting, house/yardwork, driving/computer work    Modalities:  CP 15'    Charges:  Timed Code Treatment Minutes: 45'   Total Treatment Minutes: 39'        [] EVAL (LOW) 91573 (typically 20 minutes face-to-face)  [] EVAL (MOD) 56412 (typically 30 minutes face-to-face)  [] EVAL (HIGH) 86155 (typically 45 minutes face-to-face)  [] RE-EVAL     [x] OL(70391) x  1   [] IONTO  [x] NMR (83163)  X 1   [] VASO  [x] Manual (40682) x   1 [] Other:  [] TA x      [] Mech Traction (02076)  [] ES(attended) (07436)      [] ES (un) (47076):     GOALS:  Patient stated goal: return to running,golf, playing with young kids    []? Progressing: []? Met: []? Not Met: []? Adjusted     Therapist goals for Patient:   Short Term Goals: To be achieved in: 2 -4 weeks  1. Independent in HEP and progression per patient tolerance, in order to prevent re-injury. [x]? Progressing: []? Met: []? Not Met: []? Adjusted   2. Patient will have a decrease in pain to facilitate improvement in movement, function, and ADLs as indicated by Functional Deficits. [x]? Progressing: []? Met: []? Not Met: []? Adjusted     Long Term Goals: To be achieved in: 12 weeks  1. Disability index score of 20% or less for the UEFS to assist with reaching prior level of function. [x]? Progressing: []? Met: []? Not Met: []? Adjusted  2. Patient will demonstrate increased AROM to WNL to allow for proper joint functioning as indicated by patients Functional Deficits. [x]? Progressing: []? Met: []? Not Met: []? Adjusted  3. Patient will demonstrate an increase in strength to good scapular and core control  for UE to allow for proper functional mobility as indicated by patients Functional Deficits. [x]? Progressing: []? Met: []? Not Met: []? Adjusted  4.  Patient will return to light adls personal hygiene, dressing, small meal prep functional activities without increased symptoms or restriction. [x]? Progressing: []? Met: []? Not Met: []? Adjusted  5. Patient will return to heavy adls yardwork, lifting greater 20+# functional activities without increased symptoms or restriction  [x]? Progressing: []? Met: []? Not Met: []? Adjusted        Overall Progression Towards Functional goals/ Treatment Progress Update:  [] Patient is progressing as expected towards functional goals listed. [] Progression is slowed due to complexities/Impairments listed. [] Progression has been slowed due to co-morbidities. [x] Plan just implemented, too soon to assess goals progression <30days   [] Goals require adjustment due to lack of progress  [] Patient is not progressing as expected and requires additional follow up with physician  [] Other    Prognosis for POC: [x] Good [] Fair  [] Poor      Patient requires continued skilled intervention: [x] Yes  [] No    Treatment/Activity Tolerance:  [x] Patient able to complete treatment  [] Patient limited by fatigue  [] Patient limited by pain     [] Patient limited by other medical complications  [] Other:   Pt demonstrated independence with current program and compliance with surgical restrictions. Patient Education:   Reviewed diagnosis, POC, HEP and its importance. HEP instruction:    Access Code: [email or web address]   URL: [email or web address]   Date: 05/12/2020   Prepared by: Blenda Cutting     Exercises   · Seated Shoulder Shrugs - 10 reps - 3 sets - 1 - 2 hold - 3x daily - 7x weekly   · Seated Scapular Retraction - 10 reps - 3 sets - 1-2 hold - 3x daily - 7x weekly   · Supported Elbow Flexion Extension PROM - 10 reps - 1 sets - 10 hold - 3x daily - 7x weekly   · Putty Squeezes - 10 reps - 1 sets - 10 hold - 3x daily - 7x weekly   · Seated Cervical Sidebending Stretch - 5 reps - 3 sets - 30 hold - 3x daily - 7x weekly   · Seated Shoulder Flexion Towel Slide at Table Top - 10 reps - 1 sets - 10 hold - 3x daily - 7x weekly

## 2020-09-01 ENCOUNTER — HOSPITAL ENCOUNTER (OUTPATIENT)
Dept: PHYSICAL THERAPY | Age: 40
Setting detail: THERAPIES SERIES
Discharge: HOME | End: 2020-09-01
Payer: COMMERCIAL

## 2020-09-01 PROCEDURE — 97140 MANUAL THERAPY 1/> REGIONS: CPT | Performed by: PHYSICAL THERAPIST

## 2020-09-01 PROCEDURE — 97110 THERAPEUTIC EXERCISES: CPT | Performed by: PHYSICAL THERAPIST

## 2020-09-01 NOTE — FLOWSHEET NOTE
[address] and [facility]  Physical Therapy Daily Treatment Note  Date:  2020  Patient Name:  Richard Krishnamurthy    :  1980  MRN: [id number]  Restrictions/Precautions:  Diagnosis Information:  Diagnosis: R rotator cuff tear  Treatment Diagnosis: R shoulder pain       DATE OF PROCEDURE:  2020     OPERATIONS PERFORMED:  Right shoulder arthroscopy with labral  debridement, open subpectoral biceps tenodesis, arthroscopic subacromial  decompression with rotator cuff repair.     SURGEON: Megan Reed MD     ASSISTANT: Chetna Rosado MD     ANESTHESIA:  General.     PREOPERATIVE DIAGNOSES:  Rotator cuff tear, biceps tendinopathy with  SLAP tear and subacromial impingement.     POSTOPERATIVE DIAGNOSES:  Rotator cuff tear, biceps tendinopathy with  SLAP tear and subacromial impingement.               Insurance/Certification information:  PT Insurance Information: [facility] UNL 80  Physician Information:  Referring Practitioner: Megan Reed  Has the plan of care been signed (Y/N):        []  Yes  [x]  No     Date of Patient follow up with Physician: per MD      Is this a Progress Report:     []  Yes  [x]  No        If Yes:  Date Range for reporting period:  Beginning2020  Ending    Progress report will be due (10 Rx or 30 days whichever is less):       Recertification will be due (POC Duration  / 90 days whichever is less): 2020        Visit # Insurance Allowable Auth Required   22 unl 80/20 []  Yes [x]  No        Functional Scale: UEFS 90%   Date assessed:  2020     Latex Allergy:  [x]NO      []YES  Preferred Language for Healthcare:   [x]English       []other:    Pain level:      SUBJECTIVE: Pt     OBJECTIVE:   Progress note     ROM PROM AROM  Comment     L R L R     Flexion   ~100 table slides         Abduction   ~90 table slide         ER             IR             Other  (cervical)             Other                    Strength not performed due to recent surgery L R Comment   Flexion         Abduction         ER         IR         Supraspinatus         Upper Trap         Lower Trap         Mid Trap         Rhomboids         Biceps         Triceps         Horizontal Abduction         Horizontal Adduction         Lats                  Reflexes/Sensation:               [x]? Dermatomes/Myotomes intact               []? Reflexes equal and normal bilaterally               []? Other:     Joint mobility:               []? Normal               [x]? Hypo due to recent surgery but not formally assessed              []? Hyper     Palpation: general TTP due to recent surgery     Functional Mobility/Transfers: modified I with ultra sling R UE     Posture: slouched  With ultra sling, reviewed proper  Fit for sling.     Bandages/Dressings/Incisions: healing well with (-) signs of infection 5/19      Gait: (include devices/WB status): modified I with ultrasling      RESTRICTIONS/PRECAUTIONS: RCR, bicep tenodesis protocols    Exercises/Interventions: DOS 5/6/20, MD appt.  9/8/20  Exercises:  Exercise/Equipment Resistance/Repetitions Other comments   Stretching/PROM     Wand             Pulleys 5'                     Ll/ld gentle bicep     Wall walk     Sitting SB rollout + wedge                                                Biceps     Triceps          PRE's     Supine raises  1# - 25x     SA punch  ABC's X 30 1#  X 1 1#    External Rotation sidelying - active: 30x     ABC ball on wall Red 7 oz x 1     Shrugs     EXT     Reverse Flys     Serratus     Prone row / ext/m trap  X 30 ea; 30x 1/2     Wall mini push ups 15x     Standing raises  30x    Retraction / ext 30x ea red       Cable Column/Theraband 30x blue     External Rotation     Internal Rotation     Shrugs     Lats     Ext     Flex     Scapular Retraction     BIC     TRIC     PNF     Quadruped weight shifts     Bosu Circles (Quadruped) 20x       10x each dirction    Dynamic Stability          Plyoback Manual interventions     Prom/stm  15'               Therapeutic Exercise and NMR EXR  [x] (51715) Provided verbal/tactile cueing for activities related to strengthening, flexibility, endurance, ROM  for improvements in scapular, scapulothoracic and UE control with self care, reaching, carrying, lifting, house/yardwork, driving/computer work.    [] (76331) Provided verbal/tactile cueing for activities related to improving balance, coordination, kinesthetic sense, posture, motor skill, proprioception  to assist with  scapular, scapulothoracic and UE control with self care, reaching, carrying, lifting, house/yardwork, driving/computer work. Therapeutic Activities:    [] (28284 or 52850) Provided verbal/tactile cueing for activities related to improving balance, coordination, kinesthetic sense, posture, motor skill, proprioception and motor activation to allow for proper function of scapular, scapulothoracic and UE control with self care, carrying, lifting, driving/computer work.      Home Exercise Program:    [x] (40546) Reviewed/Progressed HEP activities related to strengthening, flexibility, endurance, ROM of scapular, scapulothoracic and UE control with self care, reaching, carrying, lifting, house/yardwork, driving/computer work  [] (36854) Reviewed/Progressed HEP activities related to improving balance, coordination, kinesthetic sense, posture, motor skill, proprioception of scapular, scapulothoracic and UE control with self care, reaching, carrying, lifting, house/yardwork, driving/computer work      Manual Treatments:  PROM / STM / Oscillations-Mobs:  G-I, II, III, IV (PA's, Inf., Post.)  [x] (51726) Provided manual therapy to mobilize soft tissue/joints of cervical/CT, scapular GHJ and UE for the purpose of modulating pain, promoting relaxation,  increasing ROM, reducing/eliminating soft tissue swelling/inflammation/restriction, improving soft tissue extensibility and allowing for proper ROM for normal function with self care, reaching, carrying, lifting, house/yardwork, driving/computer work    Modalities:  CP 15'    Charges:  Timed Code Treatment Minutes: 50'   Total Treatment Minutes: 50'        [] EVAL (LOW) 26714 (typically 20 minutes face-to-face)  [] EVAL (MOD) 70482 (typically 30 minutes face-to-face)  [] EVAL (HIGH) 19789 (typically 45 minutes face-to-face)  [] RE-EVAL     [x] NK(32530) x  2   [] IONTO  [] NMR (79718)  X    [] VASO  [x] Manual (53727) x   1 [] Other:  [] TA x      [] Mech Traction (92078)  [] ES(attended) (71205)      [] ES (un) (78026):     GOALS:  Patient stated goal: return to running,golf, playing with young kids    []? Progressing: []? Met: []? Not Met: []? Adjusted     Therapist goals for Patient:   Short Term Goals: To be achieved in: 2 -4 weeks  1. Independent in HEP and progression per patient tolerance, in order to prevent re-injury. [x]? Progressing: []? Met: []? Not Met: []? Adjusted   2. Patient will have a decrease in pain to facilitate improvement in movement, function, and ADLs as indicated by Functional Deficits. [x]? Progressing: []? Met: []? Not Met: []? Adjusted     Long Term Goals: To be achieved in: 12 weeks  1. Disability index score of 20% or less for the UEFS to assist with reaching prior level of function. [x]? Progressing: []? Met: []? Not Met: []? Adjusted  2. Patient will demonstrate increased AROM to WNL to allow for proper joint functioning as indicated by patients Functional Deficits. [x]? Progressing: []? Met: []? Not Met: []? Adjusted  3. Patient will demonstrate an increase in strength to good scapular and core control  for UE to allow for proper functional mobility as indicated by patients Functional Deficits. [x]? Progressing: []? Met: []? Not Met: []? Adjusted  4. Patient will return to light adls personal hygiene, dressing, small meal prep functional activities without increased symptoms or restriction. [x]? Progressing: []?  Met: []? Not Met: []? Adjusted  5. Patient will return to heavy adls yardwork, lifting greater 20+# functional activities without increased symptoms or restriction  [x]? Progressing: []? Met: []? Not Met: []? Adjusted        Overall Progression Towards Functional goals/ Treatment Progress Update:  [] Patient is progressing as expected towards functional goals listed. [] Progression is slowed due to complexities/Impairments listed. [] Progression has been slowed due to co-morbidities. [x] Plan just implemented, too soon to assess goals progression <30days   [] Goals require adjustment due to lack of progress  [] Patient is not progressing as expected and requires additional follow up with physician  [] Other    Prognosis for POC: [x] Good [] Fair  [] Poor      Patient requires continued skilled intervention: [x] Yes  [] No    Treatment/Activity Tolerance:  [x] Patient able to complete treatment  [] Patient limited by fatigue  [] Patient limited by pain     [] Patient limited by other medical complications  [] Other:   Pt demonstrated independence with current program and compliance with surgical restrictions. Patient Education:   Reviewed diagnosis, POC, HEP and its importance. HEP instruction:    Access Code: [email or web address]   URL: [email or web address]   Date: 05/12/2020   Prepared by: Sandy Powell     Exercises   · Seated Shoulder Shrugs - 10 reps - 3 sets - 1 - 2 hold - 3x daily - 7x weekly   · Seated Scapular Retraction - 10 reps - 3 sets - 1-2 hold - 3x daily - 7x weekly   · Supported Elbow Flexion Extension PROM - 10 reps - 1 sets - 10 hold - 3x daily - 7x weekly   · Putty Squeezes - 10 reps - 1 sets - 10 hold - 3x daily - 7x weekly   · Seated Cervical Sidebending Stretch - 5 reps - 3 sets - 30 hold - 3x daily - 7x weekly   · Seated Shoulder Flexion Towel Slide at Table Top - 10 reps - 1 sets - 10 hold - 3x daily - 7x weekly   Seated Shoulder Abduction Towel Slide at Table Top - 10 reps - 1 sets - 10 hold - 3x daily - 7x weekly      PLAN:   [x] Continue per plan of care [] Evertt Flatten current plan (see comments above)  [] Plan of care initiated [] Hold pending MD visit [] Discharge      Electronically signed by: Roger Barbosa, SPT, Bobby Merritt, PT, MPT     Note: If patient does not return for scheduled/ recommended follow up visits, this note will serve as a discharge from care along with most recent update on progress.

## 2020-09-03 ENCOUNTER — HOSPITAL ENCOUNTER (OUTPATIENT)
Dept: PHYSICAL THERAPY | Age: 40
Setting detail: THERAPIES SERIES
Discharge: HOME | End: 2020-09-03
Payer: COMMERCIAL

## 2020-09-03 PROCEDURE — 97110 THERAPEUTIC EXERCISES: CPT | Performed by: PHYSICAL THERAPIST

## 2020-09-03 PROCEDURE — 97112 NEUROMUSCULAR REEDUCATION: CPT | Performed by: PHYSICAL THERAPIST

## 2020-09-03 PROCEDURE — 97140 MANUAL THERAPY 1/> REGIONS: CPT | Performed by: PHYSICAL THERAPIST

## 2020-09-03 NOTE — FLOWSHEET NOTE
The [address] [place] [address] and Sports Rehabilitation  Physical Therapy Daily Treatment Note  Date:  9/3/2020  Patient Name:  Sharon Hood    :  1980  MRN: [id number]  Restrictions/Precautions:  Diagnosis Information:  Diagnosis: R rotator cuff tear  Treatment Diagnosis: R shoulder pain       DATE OF PROCEDURE:  2020     OPERATIONS PERFORMED:  Right shoulder arthroscopy with labral  debridement, open subpectoral biceps tenodesis, arthroscopic subacromial  decompression with rotator cuff repair.     SURGEON: Amadeo Valenzuela MD     ASSISTANT: Junior Gallo MD     ANESTHESIA:  General.     PREOPERATIVE DIAGNOSES:  Rotator cuff tear, biceps tendinopathy with  SLAP tear and subacromial impingement.     POSTOPERATIVE DIAGNOSES:  Rotator cuff tear, biceps tendinopathy with  SLAP tear and subacromial impingement. Insurance/Certification information:  PT Insurance Information: [facility] UNL 80  Physician Information:  Referring Practitioner: Amadeo Valenzuela  Has the plan of care been signed (Y/N):        []  Yes  [x]  No     Date of Patient follow up with Physician: per MD      Is this a Progress Report:     []  Yes  [x]  No        If Yes:  Date Range for reporting period:  Beginning2020  Ending    Progress report will be due (10 Rx or 30 days whichever is less):       Recertification will be due (POC Duration  / 90 days whichever is less): 2020        Visit # Insurance Allowable Auth Required   23 unl 80/20 []  Yes [x]  No        Functional Scale: UEFS 90%   Date assessed:  2020     Latex Allergy:  [x]NO      []YES  Preferred Language for Healthcare:   [x]English       []other:    Pain level:   nr/10    SUBJECTIVE: Pt stated their shoulder is doing good.     OBJECTIVE:   Progress note     ROM PROM AROM  Comment     L R L R     Flexion   ~100 table slides         Abduction   ~90 table slide         ER             IR             Other  (cervical)           Other                    Strength not performed due to recent surgery L R Comment   Flexion         Abduction         ER         IR         Supraspinatus         Upper Trap         Lower Trap         Mid Trap         Rhomboids         Biceps         Triceps         Horizontal Abduction         Horizontal Adduction         Lats                  Reflexes/Sensation:               [x]? Dermatomes/Myotomes intact               []? Reflexes equal and normal bilaterally               []? Other:     Joint mobility:               []? Normal               [x]? Hypo due to recent surgery but not formally assessed              []? Hyper     Palpation: general TTP due to recent surgery     Functional Mobility/Transfers: modified I with ultra sling R UE     Posture: slouched  With ultra sling, reviewed proper  Fit for sling.     Bandages/Dressings/Incisions: healing well with (-) signs of infection 5/19      Gait: (include devices/WB status): modified I with ultrasling      RESTRICTIONS/PRECAUTIONS: RCR, bicep tenodesis protocols    Exercises/Interventions: DOS 5/6/20, MD appt.  9/8/20  Exercises:  Exercise/Equipment Resistance/Repetitions Other comments   Stretching/PROM     Wand             Pulleys 5'                     Ll/ld gentle bicep     Wall walk     Sitting SB rollout + wedge                                               Biceps     Triceps          PRE's     Supine raises  1# - 25x     Supine Chest Press 1# - 25x    SA punch  ABC's X 30 mr  X 1 1#    External Rotation sidelying - active: 30x     Side lying Abd 15x    ABC ball on wall Red 7 oz x 1     Shrugs     EXT     Reverse Flys     Serratus     Prone row / ext/m trap  X 30 ea; 25x 1/2     Wall mini push ups 15x     Standing raises  25x    Retraction / ext 30x ea blue    [employer] 8x     Flomaton on floor scaption, CCW,CW     Cable Column/Theraband 30x blue     External Rotation     Internal Rotation     Shrugs     Lats     Ext     Flex     Scapular Retraction BIC     TRIC     PNF     Quadruped weight shifts     Mini push ups on bosu     Dynamic Stability          Plyoback          Manual interventions     Prom/stm  15'               Therapeutic Exercise and NMR EXR  [x] (45775) Provided verbal/tactile cueing for activities related to strengthening, flexibility, endurance, ROM  for improvements in scapular, scapulothoracic and UE control with self care, reaching, carrying, lifting, house/yardwork, driving/computer work.    [] (09685) Provided verbal/tactile cueing for activities related to improving balance, coordination, kinesthetic sense, posture, motor skill, proprioception  to assist with  scapular, scapulothoracic and UE control with self care, reaching, carrying, lifting, house/yardwork, driving/computer work. Therapeutic Activities:    [] (20605 or 30950) Provided verbal/tactile cueing for activities related to improving balance, coordination, kinesthetic sense, posture, motor skill, proprioception and motor activation to allow for proper function of scapular, scapulothoracic and UE control with self care, carrying, lifting, driving/computer work.      Home Exercise Program:    [x] (75662) Reviewed/Progressed HEP activities related to strengthening, flexibility, endurance, ROM of scapular, scapulothoracic and UE control with self care, reaching, carrying, lifting, house/yardwork, driving/computer work  [] (97124) Reviewed/Progressed HEP activities related to improving balance, coordination, kinesthetic sense, posture, motor skill, proprioception of scapular, scapulothoracic and UE control with self care, reaching, carrying, lifting, house/yardwork, driving/computer work      Manual Treatments:  PROM / STM / Oscillations-Mobs:  G-I, II, III, IV (PA's, Inf., Post.)  [x] (78509) Provided manual therapy to mobilize soft tissue/joints of cervical/CT, scapular GHJ and UE for the purpose of modulating pain, promoting relaxation,  increasing ROM, reducing/eliminating dressing, small meal prep functional activities without increased symptoms or restriction. [x]? Progressing: []? Met: []? Not Met: []? Adjusted  5. Patient will return to heavy adls yardwork, lifting greater 20+# functional activities without increased symptoms or restriction  [x]? Progressing: []? Met: []? Not Met: []? Adjusted        Overall Progression Towards Functional goals/ Treatment Progress Update:  [] Patient is progressing as expected towards functional goals listed. [] Progression is slowed due to complexities/Impairments listed. [] Progression has been slowed due to co-morbidities. [x] Plan just implemented, too soon to assess goals progression <30days   [] Goals require adjustment due to lack of progress  [] Patient is not progressing as expected and requires additional follow up with physician  [] Other    Prognosis for POC: [x] Good [] Fair  [] Poor      Patient requires continued skilled intervention: [x] Yes  [] No    Treatment/Activity Tolerance:  [x] Patient able to complete treatment  [] Patient limited by fatigue  [] Patient limited by pain     [] Patient limited by other medical complications  [] Other:   Pt demonstrated independence with current program and compliance with surgical restrictions. Patient Education:   Reviewed diagnosis, POC, HEP and its importance. HEP instruction:    Access Code: [email or web address]   URL: [email or web address]   Date: 05/12/2020   Prepared by: Alexandrea Asa     Exercises   · Seated Shoulder Shrugs - 10 reps - 3 sets - 1 - 2 hold - 3x daily - 7x weekly   · Seated Scapular Retraction - 10 reps - 3 sets - 1-2 hold - 3x daily - 7x weekly   · Supported Elbow Flexion Extension PROM - 10 reps - 1 sets - 10 hold - 3x daily - 7x weekly   · Putty Squeezes - 10 reps - 1 sets - 10 hold - 3x daily - 7x weekly   · Seated Cervical Sidebending Stretch - 5 reps - 3 sets - 30 hold - 3x daily - 7x weekly   · Seated Shoulder Flexion Towel Slide at Table Top - 10 reps - 1 sets - 10 hold - 3x daily - 7x weekly   Seated Shoulder Abduction Towel Slide at Table Top - 10 reps - 1 sets - 10 hold - 3x daily - 7x weekly      PLAN:   [x] Continue per plan of care [] Alter current plan (see comments above)  [] Plan of care initiated [] Hold pending MD visit [] Discharge    Therapist was present, directed the patient's care, made skilled judgement, and was responsible for assessment and treatment of the patient. Electronically signed by:  Alaina Falk, SPT, Bobby Merritt, PT, MPT     Note: If patient does not return for scheduled/ recommended follow up visits, this note will serve as a discharge from care along with most recent update on progress.

## 2020-09-06 NOTE — PROGRESS NOTES
The [address] [facility] and [facility]  [address], [place], [address] [facility]  Phone: [phone]   Fax:     [phone]                                                      Physical Therapy Re-Certification Plan of Care    Dear dr Sara Brandt   ,    We had the pleasure of treating the following patient for physical therapy services at [address] [place] [address]. A summary of our findings can be found in the updated assessment below. This includes our plan of care. If you have any questions or concerns regarding these findings, please do not hesitate to contact me at the office phone number checked above. Thank you for the referral.       Physician Signature:_______________________________Date:__________________  By signing above (or electronic signature), therapists plan is approved by physician      Patient: Vielka Rojas   : 1980   MRN: [id number]  Referring Physician:        Evaluation Date: 7/15/2020     Medical Diagnosis Information:  ·   Diagnosis: R rotator cuff tear  Treatment Diagnosis: R shoulder pain                                            Insurance information:   anthem      Date Range:  - 7/15  Total visits:       G-Codes: (if applicable)       SUBJECTIVE: \"I am feeling better\"      Pain Scale: 5/10  :     OBJECTIVE:   Impairments:    ROM:  Decreased 25% ue (arom)     Strength/Neuromuscular control:  N/a       MEDICARE CAP EXCEPTION DOCUMENTATION  I certify that this patient meets one of the below criteria necessary for becoming an exception to the Medicare cap on therapy services:  []  The patient has a condition that has a direct and significant impact on the need for therapy. (Significantly impacts the rate of recovery)  []  The patient has a complexity that has a direct and significant impact on the need for therapy.   (Significantly impacts the rate of recovery and is associated with a primary condition.) []  The patient has associated variables that influence the amount of treatment to include:  Social support, self-efficacy/motivation, prognosis, time since onset/acuity. []  The patient has generalized musculoskeletal conditions or a condition affecting multiple sites that will have a direct impact on the rate of recovery. []  The patient had a prior episode of outpatient therapy during this calendar year for a different condition. []  The patient has a mental or cognitive disorder in addition to the condition being treated that will have a direct and significant impact on the rate of recovery. ASSESSMENT:    Response to Treatment:   - Patient is responding well to treatment and improvement is noted with regards to goals    Updated Functional deficiencies/Impairments: The patient demonstrated at least    35% but less than  36% impairment, limitation or restriction in:   - carrying, moving and handling objects.  - Decreased upper extremity functional strength and neuromuscular control - Reduced overall functional level with carrying /lifting  - Noted GHJ joint hypomobility- Reduced overall functional level with carrying /lifting   - Noted cervicothoracic joint hypomobility- Reduced overall functional level with carrying /lifting   - Pain/difficulty with driving and/or computer work- Reduced overall functional level   - Pain/difficulty associated with self care tasks- Reduced overall functional level and ADL status  - Pain/difficulty with lifting/reaching/carrying - Reduced overall functional level with carrying and lifting  - Pain/difficulty with household work- Reduced ADL status  - Unable to perform sport/recreational activity due to pain and dysfunction    Updated Classification:  - signs/symptoms consistent with post-surgical status including decreased ROM, strength and function. -- bony or soft tissue surgical procedure. (Pattern 4I)  - ligament or other connective tissue dysfunction. (Pattern 4D)  - localized inflammation. (Pattern 4E)    Prognosis/Rehab Potential:       - Good     Tolerance of evaluation/treatment:     - Good     New/Updated Goals (if applicable):  [x] No change to goals established upon initial eval/last progress note:  New Goals:      Plan of Care:  [x] Continue Current Therapy Intervention    Frequency/Duration: 2  days per week for  4 Weeks:  Interventions:  1. Therapeutic exercise including: strength training, ROM, NMR and proprioception for the scapula, core and Upper extremity  2. Manual therapy as indicated including Dry Needling/IASTM, STM, PROM, Gr I-IV mobilizations, spinal mobilization/manipulation. 3. Modalities as needed including: thermal agents, E-stim, US, iontophoresis as indicated. 4. Patient education on joint protection, activity modification, progression of HEP.         Electronically signed by:  Avery Mcgowan PT , MPT

## 2020-09-08 ENCOUNTER — OFFICE VISIT (OUTPATIENT)
Dept: ORTHOPEDIC SURGERY | Age: 40
End: 2020-09-08
Payer: COMMERCIAL

## 2020-09-08 ENCOUNTER — HOSPITAL ENCOUNTER (OUTPATIENT)
Dept: PHYSICAL THERAPY | Age: 40
Setting detail: THERAPIES SERIES
Discharge: HOME | End: 2020-09-08
Payer: COMMERCIAL

## 2020-09-08 VITALS — BODY MASS INDEX: 32.44 KG/M2 | HEIGHT: 64 IN | WEIGHT: 190 LBS | TEMPERATURE: 97.3 F

## 2020-09-08 PROCEDURE — 97140 MANUAL THERAPY 1/> REGIONS: CPT | Performed by: PHYSICAL THERAPIST

## 2020-09-08 PROCEDURE — 97112 NEUROMUSCULAR REEDUCATION: CPT | Performed by: PHYSICAL THERAPIST

## 2020-09-08 PROCEDURE — 97110 THERAPEUTIC EXERCISES: CPT | Performed by: PHYSICAL THERAPIST

## 2020-09-08 PROCEDURE — 99212 OFFICE O/P EST SF 10 MIN: CPT | Performed by: ORTHOPAEDIC SURGERY

## 2020-09-08 NOTE — FLOWSHEET NOTE
The [address] [place] [address] and Sports Rehabilitation  Physical Therapy Daily Treatment Note  Date:  2020  Patient Name:  Alisha Peralta    :  1980  MRN: [id number]  Restrictions/Precautions:  Diagnosis Information:  Diagnosis: R rotator cuff tear  Treatment Diagnosis: R shoulder pain       DATE OF PROCEDURE:  2020     OPERATIONS PERFORMED:  Right shoulder arthroscopy with labral  debridement, open subpectoral biceps tenodesis, arthroscopic subacromial  decompression with rotator cuff repair.     SURGEON: Akua Newton MD     ASSISTANT: Dedra Tavarez MD     ANESTHESIA:  General.     PREOPERATIVE DIAGNOSES:  Rotator cuff tear, biceps tendinopathy with  SLAP tear and subacromial impingement.     POSTOPERATIVE DIAGNOSES:  Rotator cuff tear, biceps tendinopathy with  SLAP tear and subacromial impingement. Insurance/Certification information:  PT Insurance Information: [facility] UNL 80  Physician Information:  Referring Practitioner: Akua Newton  Has the plan of care been signed (Y/N):        []  Yes  [x]  No     Date of Patient follow up with Physician: per MD      Is this a Progress Report:     []  Yes  [x]  No        If Yes:  Date Range for reporting period:  Beginning2020  Ending    Progress report will be due (10 Rx or 30 days whichever is less):       Recertification will be due (POC Duration  / 90 days whichever is less): 2020        Visit # Insurance Allowable Auth Required   24 unl 80/20 []  Yes [x]  No        Functional Scale: UEFS 90%   Date assessed:  2020     Latex Allergy:  [x]NO      []YES  Preferred Language for Healthcare:   [x]English       []other:    Pain level:   nr/10    SUBJECTIVE: Pt stated their shoulder is doing good.     OBJECTIVE:   Progress note     ROM PROM AROM  Comment     L R L R     Flexion   ~100 table slides         Abduction   ~90 table slide         ER             IR             Other  (cervical)           Scapular Retraction     BIC     TRIC     PNF     Quadruped weight shifts     Mini push ups on bosu     Dynamic Stability          Plyoback          Manual interventions     Prom/stm  15'               Therapeutic Exercise and NMR EXR  [x] (20470) Provided verbal/tactile cueing for activities related to strengthening, flexibility, endurance, ROM  for improvements in scapular, scapulothoracic and UE control with self care, reaching, carrying, lifting, house/yardwork, driving/computer work.    [] (36340) Provided verbal/tactile cueing for activities related to improving balance, coordination, kinesthetic sense, posture, motor skill, proprioception  to assist with  scapular, scapulothoracic and UE control with self care, reaching, carrying, lifting, house/yardwork, driving/computer work. Therapeutic Activities:    [] (55196 or 27521) Provided verbal/tactile cueing for activities related to improving balance, coordination, kinesthetic sense, posture, motor skill, proprioception and motor activation to allow for proper function of scapular, scapulothoracic and UE control with self care, carrying, lifting, driving/computer work.      Home Exercise Program:    [x] (02382) Reviewed/Progressed HEP activities related to strengthening, flexibility, endurance, ROM of scapular, scapulothoracic and UE control with self care, reaching, carrying, lifting, house/yardwork, driving/computer work  [] (20755) Reviewed/Progressed HEP activities related to improving balance, coordination, kinesthetic sense, posture, motor skill, proprioception of scapular, scapulothoracic and UE control with self care, reaching, carrying, lifting, house/yardwork, driving/computer work      Manual Treatments:  PROM / STM / Oscillations-Mobs:  G-I, II, III, IV (PA's, Inf., Post.)  [x] (43778) Provided manual therapy to mobilize soft tissue/joints of cervical/CT, scapular GHJ and UE for the purpose of modulating pain, promoting relaxation,  increasing ROM, reducing/eliminating soft tissue swelling/inflammation/restriction, improving soft tissue extensibility and allowing for proper ROM for normal function with self care, reaching, carrying, lifting, house/yardwork, driving/computer work    Modalities:  CP 15'    Charges:  Timed Code Treatment Minutes: 48'   Total Treatment Minutes: 50'        [] EVAL (LOW) 84959 (typically 20 minutes face-to-face)  [] EVAL (MOD) 64719 (typically 30 minutes face-to-face)  [] EVAL (HIGH) 23527 (typically 45 minutes face-to-face)  [] RE-EVAL     [x] CL(50521) x  1   [] IONTO  [x] NMR (65997)  X 1   [] VASO  [x] Manual (45705) x   1 [] Other:  [] TA x      [] Mech Traction (38288)  [] ES(attended) (18961)      [] ES (un) (66755):     GOALS:  Patient stated goal: return to running,golf, playing with young kids    []? Progressing: []? Met: []? Not Met: []? Adjusted     Therapist goals for Patient:   Short Term Goals: To be achieved in: 2 -4 weeks  1. Independent in HEP and progression per patient tolerance, in order to prevent re-injury. [x]? Progressing: []? Met: []? Not Met: []? Adjusted   2. Patient will have a decrease in pain to facilitate improvement in movement, function, and ADLs as indicated by Functional Deficits. [x]? Progressing: []? Met: []? Not Met: []? Adjusted     Long Term Goals: To be achieved in: 12 weeks  1. Disability index score of 20% or less for the UEFS to assist with reaching prior level of function. [x]? Progressing: []? Met: []? Not Met: []? Adjusted  2. Patient will demonstrate increased AROM to WNL to allow for proper joint functioning as indicated by patients Functional Deficits. [x]? Progressing: []? Met: []? Not Met: []? Adjusted  3. Patient will demonstrate an increase in strength to good scapular and core control  for UE to allow for proper functional mobility as indicated by patients Functional Deficits. [x]? Progressing: []? Met: []? Not Met: []? Adjusted  4.  Patient will return to light adls personal hygiene, dressing, small meal prep functional activities without increased symptoms or restriction. [x]? Progressing: []? Met: []? Not Met: []? Adjusted  5. Patient will return to heavy adls yardwork, lifting greater 20+# functional activities without increased symptoms or restriction  [x]? Progressing: []? Met: []? Not Met: []? Adjusted        Overall Progression Towards Functional goals/ Treatment Progress Update:  [] Patient is progressing as expected towards functional goals listed. [] Progression is slowed due to complexities/Impairments listed. [] Progression has been slowed due to co-morbidities. [x] Plan just implemented, too soon to assess goals progression <30days   [] Goals require adjustment due to lack of progress  [] Patient is not progressing as expected and requires additional follow up with physician  [] Other    Prognosis for POC: [x] Good [] Fair  [] Poor      Patient requires continued skilled intervention: [x] Yes  [] No    Treatment/Activity Tolerance:  [x] Patient able to complete treatment  [] Patient limited by fatigue  [] Patient limited by pain     [] Patient limited by other medical complications  [] Other:   Pt demonstrated independence with current program and compliance with surgical restrictions. Patient Education:   Reviewed diagnosis, POC, HEP and its importance. HEP instruction:    Access Code: [email or web address]   URL: [email or web address]   Date: 05/12/2020   Prepared by: Dewayne Gu     Exercises   · Seated Shoulder Shrugs - 10 reps - 3 sets - 1 - 2 hold - 3x daily - 7x weekly   · Seated Scapular Retraction - 10 reps - 3 sets - 1-2 hold - 3x daily - 7x weekly   · Supported Elbow Flexion Extension PROM - 10 reps - 1 sets - 10 hold - 3x daily - 7x weekly   · Putty Squeezes - 10 reps - 1 sets - 10 hold - 3x daily - 7x weekly   · Seated Cervical Sidebending Stretch - 5 reps - 3 sets - 30 hold - 3x daily - 7x weekly   · Seated Shoulder Flexion Towel Slide at Table Top - 10 reps - 1 sets - 10 hold - 3x daily - 7x weekly   Seated Shoulder Abduction Towel Slide at Table Top - 10 reps - 1 sets - 10 hold - 3x daily - 7x weekly      PLAN:   [x] Continue per plan of care [] Alter current plan (see comments above)  [] Plan of care initiated [] Hold pending MD visit [] Discharge    Therapist was present, directed the patient's care, made skilled judgement, and was responsible for assessment and treatment of the patient. Electronically signed by:  Tito Ma, SPT, Bobby Merritt, PT, MPT     Note: If patient does not return for scheduled/ recommended follow up visits, this note will serve as a discharge from care along with most recent update on progress.

## 2020-09-08 NOTE — PROGRESS NOTES
High Blood Pressure Mother     High Cholesterol Mother     High Cholesterol Brother     Emphysema Maternal Grandmother     High Blood Pressure Maternal Grandmother     High Cholesterol Maternal Grandmother     High Blood Pressure Maternal Grandfather     High Cholesterol Maternal Grandfather     Heart Disease Paternal Grandmother         CHF    High Blood Pressure Paternal Grandmother     High Cholesterol Paternal Grandmother     High Cholesterol Brother        Social History     Socioeconomic History    Marital status:      Spouse name: None    Number of children: None    Years of education: None    Highest education level: None   Occupational History    None   Social Needs    Financial resource strain: None    Food insecurity     Worry: None     Inability: None    Transportation needs     Medical: None     Non-medical: None   Tobacco Use    Smoking status: Never Smoker    Smokeless tobacco: Former User   Substance and Sexual Activity    Alcohol use: Yes     Comment: occasional - 2-3 drinks weekly    Drug use: No    Sexual activity: Yes     Partners: Female   Lifestyle    Physical activity     Days per week: None     Minutes per session: None    Stress: None   Relationships    Social connections     Talks on phone: None     Gets together: None     Attends Restoration service: None     Active member of club or organization: None     Attends meetings of clubs or organizations: None     Relationship status: None    Intimate partner violence     Fear of current or ex partner: None     Emotionally abused: None     Physically abused: None     Forced sexual activity: None   Other Topics Concern    None   Social History Narrative    None       Current Outpatient Medications   Medication Sig Dispense Refill    fenofibrate (TRIGLIDE) 160 MG tablet TAKE 1 TABLET BY MOUTH ONE TIME A DAY 30 tablet 5    DUPIXENT 300 MG/2ML SOSY injection Inject 300 mLs as directed every 14 days       and subscapularis muscle strength. Stability: No anterior instability. No posterior instability. Special Tests:  Crossover sign is negative. Belly press sign is negative. Lift off sign is negative. Impingement findings are negative. Labral findings are negative. Speed sign and Yergason signs are both negative. Other findings: The skin is warm dry and well perfused. Distally neurovascularly intact. Sensation is intact to light touch over the deltoid. Radiology:     Pertinent imaging reviewed. No new imaging was obtained during today's visit. Assessment :  4 months status post rotator cuff repair on 5/6/2020    Impression:  Encounter Diagnosis   Name Primary?  S/P right rotator cuff repair Yes       Office Procedures:  No orders of the defined types were placed in this encounter. Plan: Pertinent imaging was reviewed. The etiology, natural history, and treatment options for the disorder were discussed. The roles of activity medication, antiinflammatories, injections, bracing, physical therapy, and surgical interventions were all described to the patient and questions were answered. He is doing well at this time. Continue post operative physical therapy. Discussed precautions. I will see him back in 2 months, sooner if needed. Bladimir Amber is in agreement with this plan. All questions were answered to patient's satisfaction and was encouraged to call with any further questions. Álvaro Dee ATC, am scribing for and in the presence of Dr. Master Chen. 09/08/20 11:47 AM Anaid Paiz ATC                 I personally reviewed the patient's pain scale, review of systems, family history, social history, past medical history, allergies and medications. Review of systems was collected today, reviewed and is included in the medical record. It is available under the media tab.     I personally performed the services described in this documentation and scribed by Wanda Saini ATC. Danielle Mcgowan MD  Sports Medicine, Arthroscopic Knee and Shoulder Surgery    This dictation was performed with a verbal recognition program [facility]) and it was checked for errors. It is possible that there are still dictated errors within this office note. If so, please bring any errors to my attention for an addendum. All efforts were made to ensure that this office note is accurate.

## 2020-09-15 ENCOUNTER — HOSPITAL ENCOUNTER (OUTPATIENT)
Dept: PHYSICAL THERAPY | Age: 40
Setting detail: THERAPIES SERIES
Discharge: HOME | End: 2020-09-15
Payer: COMMERCIAL

## 2020-09-15 PROCEDURE — 97140 MANUAL THERAPY 1/> REGIONS: CPT | Performed by: PHYSICAL THERAPIST

## 2020-09-15 PROCEDURE — 97110 THERAPEUTIC EXERCISES: CPT | Performed by: PHYSICAL THERAPIST

## 2020-09-15 PROCEDURE — 97112 NEUROMUSCULAR REEDUCATION: CPT | Performed by: PHYSICAL THERAPIST

## 2020-09-17 NOTE — FLOWSHEET NOTE
[address] [facility]; [facility]  Physical Therapy Daily Treatment Note  Date:  9/15/2020  Patient Name:  Rodrigo Leggett    :  1980  MRN: [id number]  Restrictions/Precautions:  Diagnosis Information:  Diagnosis: R rotator cuff tear  Treatment Diagnosis: R shoulder pain       DATE OF PROCEDURE:  2020     OPERATIONS PERFORMED:  Right shoulder arthroscopy with labral  debridement, open subpectoral biceps tenodesis, arthroscopic subacromial  decompression with rotator cuff repair.     SURGEON: Amalia Villavicencio MD     ASSISTANT: Radha Shaffer MD     ANESTHESIA:  General.     PREOPERATIVE DIAGNOSES:  Rotator cuff tear, biceps tendinopathy with  SLAP tear and subacromial impingement.     POSTOPERATIVE DIAGNOSES:  Rotator cuff tear, biceps tendinopathy with  SLAP tear and subacromial impingement. Insurance/Certification information:  PT Insurance Information: R UNL 80/20  Physician Information:  Referring Practitioner: Amalia Villavicencio  Has the plan of care been signed (Y/N):        []  Yes  [x]  No     Date of Patient follow up with Physician: per MD      Is this a Progress Report:     []  Yes  [x]  No        If Yes:  Date Range for reporting period:  Beginning2020  Ending    Progress report will be due (10 Rx or 30 days whichever is less):       Recertification will be due (POC Duration  / 90 days whichever is less): 2020        Visit # Insurance Allowable Auth Required   25 unl 80/20 []  Yes [x]  No        Functional Scale: UEFS 90%   Date assessed:  2020     Latex Allergy:  [x]NO      []YES  Preferred Language for Healthcare:   [x]English       []other:    Pain level:   nr/10    SUBJECTIVE: Pt stated their shoulder is doing good.     OBJECTIVE:   Progress note     ROM PROM AROM  Comment     L R L R     Flexion   ~100 table slides         Abduction   ~90 table slide         ER             IR             Other  (cervical)     Ext     Flex     Scapular Retraction     BIC     TRIC     PNF     Quadruped weight shifts     Mini push ups on bosu 20x       Dynamic Stability          Plyoback          Manual interventions     Prom/stm  15'               Therapeutic Exercise and NMR EXR  [x] (22289) Provided verbal/tactile cueing for activities related to strengthening, flexibility, endurance, ROM  for improvements in scapular, scapulothoracic and UE control with self care, reaching, carrying, lifting, house/yardwork, driving/computer work.    [] (93945) Provided verbal/tactile cueing for activities related to improving balance, coordination, kinesthetic sense, posture, motor skill, proprioception  to assist with  scapular, scapulothoracic and UE control with self care, reaching, carrying, lifting, house/yardwork, driving/computer work. Therapeutic Activities:    [] (42461 or 66814) Provided verbal/tactile cueing for activities related to improving balance, coordination, kinesthetic sense, posture, motor skill, proprioception and motor activation to allow for proper function of scapular, scapulothoracic and UE control with self care, carrying, lifting, driving/computer work.      Home Exercise Program:    [x] (97045) Reviewed/Progressed HEP activities related to strengthening, flexibility, endurance, ROM of scapular, scapulothoracic and UE control with self care, reaching, carrying, lifting, house/yardwork, driving/computer work  [] (87122) Reviewed/Progressed HEP activities related to improving balance, coordination, kinesthetic sense, posture, motor skill, proprioception of scapular, scapulothoracic and UE control with self care, reaching, carrying, lifting, house/yardwork, driving/computer work      Manual Treatments:  PROM / STM / Oscillations-Mobs:  G-I, II, III, IV (PA's, Inf., Post.)  [x] (31362) Provided manual therapy to mobilize soft tissue/joints of cervical/CT, scapular GHJ and UE for the purpose of modulating pain, promoting relaxation,  increasing ROM, reducing/eliminating soft tissue swelling/inflammation/restriction, improving soft tissue extensibility and allowing for proper ROM for normal function with self care, reaching, carrying, lifting, house/yardwork, driving/computer work    Modalities:  CP 15'    Charges:  Timed Code Treatment Minutes: 48'   Total Treatment Minutes: 50'        [] EVAL (LOW) 71790 (typically 20 minutes face-to-face)  [] EVAL (MOD) 41141 (typically 30 minutes face-to-face)  [] EVAL (HIGH) 61116 (typically 45 minutes face-to-face)  [] RE-EVAL     [x] AH(92890) x  1   [] IONTO  [x] NMR (60462)  X 1   [] VASO  [x] Manual (87012) x   1 [] Other:  [] TA x      [] Mech Traction (99089)  [] ES(attended) (69048)      [] ES (un) (47856):     GOALS:  Patient stated goal: return to running,golf, playing with young kids    []? Progressing: []? Met: []? Not Met: []? Adjusted     Therapist goals for Patient:   Short Term Goals: To be achieved in: 2 -4 weeks  1. Independent in HEP and progression per patient tolerance, in order to prevent re-injury. [x]? Progressing: []? Met: []? Not Met: []? Adjusted   2. Patient will have a decrease in pain to facilitate improvement in movement, function, and ADLs as indicated by Functional Deficits. [x]? Progressing: []? Met: []? Not Met: []? Adjusted     Long Term Goals: To be achieved in: 12 weeks  1. Disability index score of 20% or less for the UEFS to assist with reaching prior level of function. [x]? Progressing: []? Met: []? Not Met: []? Adjusted  2. Patient will demonstrate increased AROM to WNL to allow for proper joint functioning as indicated by patients Functional Deficits. [x]? Progressing: []? Met: []? Not Met: []? Adjusted  3. Patient will demonstrate an increase in strength to good scapular and core control  for UE to allow for proper functional mobility as indicated by patients Functional Deficits. [x]? Progressing: []? Met: []? Not Met: []? Adjusted  4. - 7x weekly   · Seated Shoulder Flexion Towel Slide at Table Top - 10 reps - 1 sets - 10 hold - 3x daily - 7x weekly   Seated Shoulder Abduction Towel Slide at Table Top - 10 reps - 1 sets - 10 hold - 3x daily - 7x weekly      PLAN:   [x] Continue per plan of care [] Alter current plan (see comments above)  [] Plan of care initiated [] Hold pending MD visit [] Discharge    Therapist was present, directed the patient's care, made skilled judgement, and was responsible for assessment and treatment of the patient. Electronically signed by:   Marty Griffin, PT, MPT     Note: If patient does not return for scheduled/ recommended follow up visits, this note will serve as a discharge from care along with most recent update on progress.

## 2020-09-22 ENCOUNTER — HOSPITAL ENCOUNTER (OUTPATIENT)
Dept: PHYSICAL THERAPY | Age: 40
Setting detail: THERAPIES SERIES
Discharge: HOME | End: 2020-09-22
Payer: COMMERCIAL

## 2020-09-22 PROCEDURE — 97140 MANUAL THERAPY 1/> REGIONS: CPT | Performed by: PHYSICAL THERAPIST

## 2020-09-22 PROCEDURE — 97112 NEUROMUSCULAR REEDUCATION: CPT | Performed by: PHYSICAL THERAPIST

## 2020-09-22 PROCEDURE — 97110 THERAPEUTIC EXERCISES: CPT | Performed by: PHYSICAL THERAPIST

## 2020-09-26 NOTE — FLOWSHEET NOTE
[address] [facility]; [facility]  Physical Therapy Daily Treatment Note  Date:  2020  Patient Name:  Alisha Peralta    :  1980  MRN: [id number]  Restrictions/Precautions:  Diagnosis Information:  Diagnosis: R rotator cuff tear  Treatment Diagnosis: R shoulder pain       DATE OF PROCEDURE:  2020     OPERATIONS PERFORMED:  Right shoulder arthroscopy with labral  debridement, open subpectoral biceps tenodesis, arthroscopic subacromial  decompression with rotator cuff repair.     SURGEON: Akua Newton MD     ASSISTANT: Dedra Tavarez MD     ANESTHESIA:  General.     PREOPERATIVE DIAGNOSES:  Rotator cuff tear, biceps tendinopathy with  SLAP tear and subacromial impingement.     POSTOPERATIVE DIAGNOSES:  Rotator cuff tear, biceps tendinopathy with  SLAP tear and subacromial impingement. Insurance/Certification information:  PT Insurance Information: [facility] UNL 80  Physician Information:  Referring Practitioner: Akua Newton  Has the plan of care been signed (Y/N):        []  Yes  [x]  No     Date of Patient follow up with Physician: per MD      Is this a Progress Report:     []  Yes  [x]  No        If Yes:  Date Range for reporting period:  Beginning2020  Ending    Progress report will be due (10 Rx or 30 days whichever is less):       Recertification will be due (POC Duration  / 90 days whichever is less): 2020        Visit # Insurance Allowable Auth Required   26 unl 80/ []  Yes [x]  No        Functional Scale: UEFS 90%   Date assessed:  2020     Latex Allergy:  [x]NO      []YES  Preferred Language for Healthcare:   [x]English       []other:    Pain level:   nr/10    SUBJECTIVE:  \"feeling pretty good. Thomasena Dilling \"     OBJECTIVE:        ROM PROM AROM  Comment     L R L R     Flexion   ~100 table slides         Abduction   ~90 table slide         ER             IR             Other  (cervical)             Other            Scapular Retraction     BIC     TRIC     PNF     Quadruped weight shifts     Mini push ups on bosu 20x       Dynamic Stability          Plyoback          Manual interventions     Prom/stm  15'               Therapeutic Exercise and NMR EXR  [x] (55484) Provided verbal/tactile cueing for activities related to strengthening, flexibility, endurance, ROM  for improvements in scapular, scapulothoracic and UE control with self care, reaching, carrying, lifting, house/yardwork, driving/computer work.    [] (63284) Provided verbal/tactile cueing for activities related to improving balance, coordination, kinesthetic sense, posture, motor skill, proprioception  to assist with  scapular, scapulothoracic and UE control with self care, reaching, carrying, lifting, house/yardwork, driving/computer work. Therapeutic Activities:    [] (18469 or 36220) Provided verbal/tactile cueing for activities related to improving balance, coordination, kinesthetic sense, posture, motor skill, proprioception and motor activation to allow for proper function of scapular, scapulothoracic and UE control with self care, carrying, lifting, driving/computer work.      Home Exercise Program:    [x] (17553) Reviewed/Progressed HEP activities related to strengthening, flexibility, endurance, ROM of scapular, scapulothoracic and UE control with self care, reaching, carrying, lifting, house/yardwork, driving/computer work  [] (08123) Reviewed/Progressed HEP activities related to improving balance, coordination, kinesthetic sense, posture, motor skill, proprioception of scapular, scapulothoracic and UE control with self care, reaching, carrying, lifting, house/yardwork, driving/computer work      Manual Treatments:  PROM / STM / Oscillations-Mobs:  G-I, II, III, IV (PA's, Inf., Post.)  [x] (63847) Provided manual therapy to mobilize soft tissue/joints of cervical/CT, scapular GHJ and UE for the purpose of modulating pain, promoting relaxation, increasing ROM, reducing/eliminating soft tissue swelling/inflammation/restriction, improving soft tissue extensibility and allowing for proper ROM for normal function with self care, reaching, carrying, lifting, house/yardwork, driving/computer work    Modalities:  CP 15'    Charges:  Timed Code Treatment Minutes: 48'   Total Treatment Minutes: 50'        [] EVAL (LOW) 91119 (typically 20 minutes face-to-face)  [] EVAL (MOD) 03601 (typically 30 minutes face-to-face)  [] EVAL (HIGH) 28725 (typically 45 minutes face-to-face)  [] RE-EVAL     [x] YY(92283) x  1   [] IONTO  [x] NMR (82353)  X 1   [] VASO  [x] Manual (50234) x   1 [] Other:  [] TA x      [] Mech Traction (63156)  [] ES(attended) (19159)      [] ES (un) (01992):     GOALS:  Patient stated goal: return to running,golf, playing with young kids    []? Progressing: []? Met: []? Not Met: []? Adjusted     Therapist goals for Patient:   Short Term Goals: To be achieved in: 2 -4 weeks  1. Independent in HEP and progression per patient tolerance, in order to prevent re-injury. [x]? Progressing: []? Met: []? Not Met: []? Adjusted   2. Patient will have a decrease in pain to facilitate improvement in movement, function, and ADLs as indicated by Functional Deficits. [x]? Progressing: []? Met: []? Not Met: []? Adjusted     Long Term Goals: To be achieved in: 12 weeks  1. Disability index score of 20% or less for the UEFS to assist with reaching prior level of function. [x]? Progressing: []? Met: []? Not Met: []? Adjusted  2. Patient will demonstrate increased AROM to WNL to allow for proper joint functioning as indicated by patients Functional Deficits. [x]? Progressing: []? Met: []? Not Met: []? Adjusted  3. Patient will demonstrate an increase in strength to good scapular and core control  for UE to allow for proper functional mobility as indicated by patients Functional Deficits. [x]? Progressing: []? Met: []? Not Met: []? Adjusted  4.  Patient will return to light adls personal hygiene, dressing, small meal prep functional activities without increased symptoms or restriction. [x]? Progressing: []? Met: []? Not Met: []? Adjusted  5. Patient will return to heavy adls yardwork, lifting greater 20+# functional activities without increased symptoms or restriction  [x]? Progressing: []? Met: []? Not Met: []? Adjusted        Overall Progression Towards Functional goals/ Treatment Progress Update:  [] Patient is progressing as expected towards functional goals listed. [] Progression is slowed due to complexities/Impairments listed. [] Progression has been slowed due to co-morbidities. [x] Plan just implemented, too soon to assess goals progression <30days   [] Goals require adjustment due to lack of progress  [] Patient is not progressing as expected and requires additional follow up with physician  [] Other    Prognosis for POC: [x] Good [] Fair  [] Poor      Patient requires continued skilled intervention: [x] Yes  [] No    Treatment/Activity Tolerance:  [x] Patient able to complete treatment  [] Patient limited by fatigue  [] Patient limited by pain     [] Patient limited by other medical complications  [] Other:   Pt demonstrated independence with current program and compliance with surgical restrictions. Patient Education:   Reviewed diagnosis, POC, HEP and its importance. HEP instruction:    Access Code: [email or web address]   URL: [email or web address]   Date: 05/12/2020   Prepared by: Sissy Sharif     Exercises   · Seated Shoulder Shrugs - 10 reps - 3 sets - 1 - 2 hold - 3x daily - 7x weekly   · Seated Scapular Retraction - 10 reps - 3 sets - 1-2 hold - 3x daily - 7x weekly   · Supported Elbow Flexion Extension PROM - 10 reps - 1 sets - 10 hold - 3x daily - 7x weekly   · Putty Squeezes - 10 reps - 1 sets - 10 hold - 3x daily - 7x weekly   · Seated Cervical Sidebending Stretch - 5 reps - 3 sets - 30 hold - 3x daily - 7x weekly · Seated Shoulder Flexion Towel Slide at Table Top - 10 reps - 1 sets - 10 hold - 3x daily - 7x weekly   Seated Shoulder Abduction Towel Slide at Table Top - 10 reps - 1 sets - 10 hold - 3x daily - 7x weekly      PLAN:   [x] Continue per plan of care [] Alter current plan (see comments above)  [] Plan of care initiated [] Hold pending MD visit [] Discharge    Therapist was present, directed the patient's care, made skilled judgement, and was responsible for assessment and treatment of the patient. Electronically signed by:   Pauline Goldberg, PT, MPT     Note: If patient does not return for scheduled/ recommended follow up visits, this note will serve as a discharge from care along with most recent update on progress.

## 2020-09-29 ENCOUNTER — HOSPITAL ENCOUNTER (OUTPATIENT)
Dept: PHYSICAL THERAPY | Age: 40
Setting detail: THERAPIES SERIES
Discharge: HOME | End: 2020-09-29
Payer: COMMERCIAL

## 2020-09-29 PROCEDURE — 97112 NEUROMUSCULAR REEDUCATION: CPT | Performed by: PHYSICAL THERAPIST

## 2020-09-29 PROCEDURE — 97110 THERAPEUTIC EXERCISES: CPT | Performed by: PHYSICAL THERAPIST

## 2020-09-29 PROCEDURE — 97140 MANUAL THERAPY 1/> REGIONS: CPT | Performed by: PHYSICAL THERAPIST

## 2020-10-01 NOTE — FLOWSHEET NOTE
[address] [place] [facility]; [facility]  Physical Therapy Daily Treatment Note  Date:  2020  Patient Name:  Christiano Walker    :  1980  MRN: [id number]  Restrictions/Precautions:  Diagnosis Information:  Diagnosis: R rotator cuff tear  Treatment Diagnosis: R shoulder pain       DATE OF PROCEDURE:  2020     OPERATIONS PERFORMED:  Right shoulder arthroscopy with labral  debridement, open subpectoral biceps tenodesis, arthroscopic subacromial  decompression with rotator cuff repair.     SURGEON: Salome Brock MD     ASSISTANT: Alicja Panda MD     ANESTHESIA:  General.     PREOPERATIVE DIAGNOSES:  Rotator cuff tear, biceps tendinopathy with  SLAP tear and subacromial impingement.     POSTOPERATIVE DIAGNOSES:  Rotator cuff tear, biceps tendinopathy with  SLAP tear and subacromial impingement.               Insurance/Certification information:  PT Insurance Information: [facility]   Physician Information:  Referring Practitioner: Salome Brock  Has the plan of care been signed (Y/N):        []  Yes  [x]  No     Date of Patient follow up with Physician: per MD      Is this a Progress Report:     []  Yes  [x]  No        If Yes:  Date Range for reporting period:  Beginning2020  Ending    Progress report will be due (10 Rx or 30 days whichever is less):       Recertification will be due (POC Duration  / 90 days whichever is less): 2020        Visit # Insurance Allowable Auth Required    unl 80 []  Yes [x]  No        Functional Scale: UEFS 90%   Date assessed:  2020     Latex Allergy:  [x]NO      []YES  Preferred Language for Healthcare:   [x]English       []other:    Pain level:   nr/10    SUBJECTIVE:  \"doing well\"     OBJECTIVE:     scaption, er     ROM PROM AROM  Comment     L R L R     Flexion            Abduction            ER             IR             Other  (cervical)             Other                    Strength not Scapular Retraction     BIC     TRIC     PNF     Quadruped weight shifts     Mini push ups on bosu       20x   20x     Dynamic Stability          Plyoback          Manual interventions     Prom/stm  15'               Therapeutic Exercise and NMR EXR  [x] (41511) Provided verbal/tactile cueing for activities related to strengthening, flexibility, endurance, ROM  for improvements in scapular, scapulothoracic and UE control with self care, reaching, carrying, lifting, house/yardwork, driving/computer work.    [] (21907) Provided verbal/tactile cueing for activities related to improving balance, coordination, kinesthetic sense, posture, motor skill, proprioception  to assist with  scapular, scapulothoracic and UE control with self care, reaching, carrying, lifting, house/yardwork, driving/computer work. Therapeutic Activities:    [] (82640 or 04639) Provided verbal/tactile cueing for activities related to improving balance, coordination, kinesthetic sense, posture, motor skill, proprioception and motor activation to allow for proper function of scapular, scapulothoracic and UE control with self care, carrying, lifting, driving/computer work.      Home Exercise Program:    [x] (64312) Reviewed/Progressed HEP activities related to strengthening, flexibility, endurance, ROM of scapular, scapulothoracic and UE control with self care, reaching, carrying, lifting, house/yardwork, driving/computer work  [] (45268) Reviewed/Progressed HEP activities related to improving balance, coordination, kinesthetic sense, posture, motor skill, proprioception of scapular, scapulothoracic and UE control with self care, reaching, carrying, lifting, house/yardwork, driving/computer work      Manual Treatments:  PROM / STM / Oscillations-Mobs:  G-I, II, III, IV (PA's, Inf., Post.)  [x] (59726) Provided manual therapy to mobilize soft tissue/joints of cervical/CT, scapular GHJ and UE for the purpose of modulating pain, promoting relaxation, increasing ROM, reducing/eliminating soft tissue swelling/inflammation/restriction, improving soft tissue extensibility and allowing for proper ROM for normal function with self care, reaching, carrying, lifting, house/yardwork, driving/computer work    Modalities:  CP 15'    Charges:  Timed Code Treatment Minutes: 48'   Total Treatment Minutes: 50'        [] EVAL (LOW) 42770 (typically 20 minutes face-to-face)  [] EVAL (MOD) 59467 (typically 30 minutes face-to-face)  [] EVAL (HIGH) 84764 (typically 45 minutes face-to-face)  [] RE-EVAL     [x] GI(30129) x  1   [] IONTO  [x] NMR (25110)  X 1   [] VASO  [x] Manual (63694) x   1 [] Other:  [] TA x      [] Mech Traction (00537)  [] ES(attended) (91809)      [] ES (un) (93810):     GOALS:  Patient stated goal: return to running,golf, playing with young kids    []? Progressing: []? Met: []? Not Met: []? Adjusted     Therapist goals for Patient:   Short Term Goals: To be achieved in: 2 -4 weeks  1. Independent in HEP and progression per patient tolerance, in order to prevent re-injury. [x]? Progressing: []? Met: []? Not Met: []? Adjusted   2. Patient will have a decrease in pain to facilitate improvement in movement, function, and ADLs as indicated by Functional Deficits. [x]? Progressing: []? Met: []? Not Met: []? Adjusted     Long Term Goals: To be achieved in: 12 weeks  1. Disability index score of 20% or less for the UEFS to assist with reaching prior level of function. [x]? Progressing: []? Met: []? Not Met: []? Adjusted  2. Patient will demonstrate increased AROM to WNL to allow for proper joint functioning as indicated by patients Functional Deficits. [x]? Progressing: []? Met: []? Not Met: []? Adjusted  3. Patient will demonstrate an increase in strength to good scapular and core control  for UE to allow for proper functional mobility as indicated by patients Functional Deficits. [x]? Progressing: []? Met: []? Not Met: []? Adjusted  4.  Patient will return to light adls personal hygiene, dressing, small meal prep functional activities without increased symptoms or restriction. [x]? Progressing: []? Met: []? Not Met: []? Adjusted  5. Patient will return to heavy adls yardwork, lifting greater 20+# functional activities without increased symptoms or restriction  [x]? Progressing: []? Met: []? Not Met: []? Adjusted        Overall Progression Towards Functional goals/ Treatment Progress Update:  [] Patient is progressing as expected towards functional goals listed. [] Progression is slowed due to complexities/Impairments listed. [] Progression has been slowed due to co-morbidities. [x] Plan just implemented, too soon to assess goals progression <30days   [] Goals require adjustment due to lack of progress  [] Patient is not progressing as expected and requires additional follow up with physician  [] Other    Prognosis for POC: [x] Good [] Fair  [] Poor      Patient requires continued skilled intervention: [x] Yes  [] No    Treatment/Activity Tolerance:  [x] Patient able to complete treatment  [] Patient limited by fatigue  [] Patient limited by pain     [] Patient limited by other medical complications  [] Other:   Pt demonstrated independence with current program and compliance with surgical restrictions. Patient Education:   Reviewed diagnosis, POC, HEP and its importance. HEP instruction:    Access Code: [email or web address]   URL: [email or web address]   Date: 05/12/2020   Prepared by: Hue Westbrook     Exercises   · Seated Shoulder Shrugs - 10 reps - 3 sets - 1 - 2 hold - 3x daily - 7x weekly   · Seated Scapular Retraction - 10 reps - 3 sets - 1-2 hold - 3x daily - 7x weekly   · Supported Elbow Flexion Extension PROM - 10 reps - 1 sets - 10 hold - 3x daily - 7x weekly   · Putty Squeezes - 10 reps - 1 sets - 10 hold - 3x daily - 7x weekly   · Seated Cervical Sidebending Stretch - 5 reps - 3 sets - 30 hold - 3x daily - 7x weekly

## 2020-10-06 ENCOUNTER — APPOINTMENT (OUTPATIENT)
Dept: PHYSICAL THERAPY | Age: 40
End: 2020-10-06
Payer: COMMERCIAL

## 2020-10-07 ENCOUNTER — HOSPITAL ENCOUNTER (OUTPATIENT)
Dept: PHYSICAL THERAPY | Age: 40
Setting detail: THERAPIES SERIES
Discharge: HOME | End: 2020-10-07
Payer: COMMERCIAL

## 2020-10-07 PROCEDURE — 97110 THERAPEUTIC EXERCISES: CPT

## 2020-10-07 NOTE — FLOWSHEET NOTE
[address] [place] [facility]; [facility]  Physical Therapy Daily Treatment Note  Date:  2020  Patient Name:  Noreen Ferreira    :  1980  MRN: [id number]  Restrictions/Precautions:  Diagnosis Information:  Diagnosis: R rotator cuff tear  Treatment Diagnosis: R shoulder pain       DATE OF PROCEDURE:  2020     OPERATIONS PERFORMED:  Right shoulder arthroscopy with labral  debridement, open subpectoral biceps tenodesis, arthroscopic subacromial  decompression with rotator cuff repair.     SURGEON: Mervat Luis MD     ASSISTANT: Nitish Ruth MD     ANESTHESIA:  General.     PREOPERATIVE DIAGNOSES:  Rotator cuff tear, biceps tendinopathy with  SLAP tear and subacromial impingement.     POSTOPERATIVE DIAGNOSES:  Rotator cuff tear, biceps tendinopathy with  SLAP tear and subacromial impingement. Insurance/Certification information:  PT Insurance Information: [facility]   Physician Information:  Referring Practitioner: Mervat Luis  Has the plan of care been signed (Y/N):        []  Yes  [x]  No     Date of Patient follow up with Physician: per MD      Is this a Progress Report:     []  Yes  [x]  No        If Yes:  Date Range for reporting period:  Beginning2020  Ending    Progress report will be due (10 Rx or 30 days whichever is less): 3215      Recertification will be due (POC Duration  / 90 days whichever is less): 2020        Visit # Insurance Allowable Auth Required   28 unl 80 []  Yes [x]  No        Functional Scale: UEFS 90%   Date assessed:  2020     Latex Allergy:  [x]NO      []YES  Preferred Language for Healthcare:   [x]English       []other:    Pain level:   0/10    SUBJECTIVE:  Pt overall feels 92% improved and feels he is able to use his arm for most ADLs pretty normal. He would still like to get back to golfing and boweling but says he is back to running and that does not bother the shoulder.  He does get Lats     Ext     Flex     Scapular Retraction     BIC     TRIC     PNF     Quadruped weight shifts     Mini push ups on bosu              Dynamic Stability          Plyoback          Manual interventions     Prom/stm                 Therapeutic Exercise and NMR EXR  [x] (16709) Provided verbal/tactile cueing for activities related to strengthening, flexibility, endurance, ROM  for improvements in scapular, scapulothoracic and UE control with self care, reaching, carrying, lifting, house/yardwork, driving/computer work.    [] (56757) Provided verbal/tactile cueing for activities related to improving balance, coordination, kinesthetic sense, posture, motor skill, proprioception  to assist with  scapular, scapulothoracic and UE control with self care, reaching, carrying, lifting, house/yardwork, driving/computer work. Therapeutic Activities:    [] (13650 or 93737) Provided verbal/tactile cueing for activities related to improving balance, coordination, kinesthetic sense, posture, motor skill, proprioception and motor activation to allow for proper function of scapular, scapulothoracic and UE control with self care, carrying, lifting, driving/computer work.      Home Exercise Program:    [x] (63881) Reviewed/Progressed HEP activities related to strengthening, flexibility, endurance, ROM of scapular, scapulothoracic and UE control with self care, reaching, carrying, lifting, house/yardwork, driving/computer work  [] (66115) Reviewed/Progressed HEP activities related to improving balance, coordination, kinesthetic sense, posture, motor skill, proprioception of scapular, scapulothoracic and UE control with self care, reaching, carrying, lifting, house/yardwork, driving/computer work      Manual Treatments:  PROM / STM / Oscillations-Mobs:  G-I, II, III, IV (PA's, Inf., Post.)  [] (37965) Provided manual therapy to mobilize soft tissue/joints of cervical/CT, scapular GHJ and UE for the purpose of modulating pain, promoting relaxation,  increasing ROM, reducing/eliminating soft tissue swelling/inflammation/restriction, improving soft tissue extensibility and allowing for proper ROM for normal function with self care, reaching, carrying, lifting, house/yardwork, driving/computer work    Modalities:  CP 15'    Charges:  Timed Code Treatment Minutes: 48'   Total Treatment Minutes: 50'        [] EVAL (LOW) 85868 (typically 20 minutes face-to-face)  [] EVAL (MOD) 36429 (typically 30 minutes face-to-face)  [] EVAL (HIGH) 65987 (typically 45 minutes face-to-face)  [] RE-EVAL     [x] UA(44636) x  3   [] IONTO  [] NMR (62582)  X    [] VASO  [] Manual (62632) x    [] Other:  [] TA x      [] Mech Traction (53318)  [] ES(attended) (01925)      [] ES (un) (10136):     GOALS:  Patient stated goal: return to running,golf, playing with young kids    []? Progressing: []? Met: []? Not Met: []? Adjusted     Therapist goals for Patient:   Short Term Goals: To be achieved in: 2 -4 weeks  1. Independent in HEP and progression per patient tolerance, in order to prevent re-injury. [x]? Progressing: []? Met: []? Not Met: []? Adjusted   2. Patient will have a decrease in pain to facilitate improvement in movement, function, and ADLs as indicated by Functional Deficits. [x]? Progressing: []? Met: []? Not Met: []? Adjusted     Long Term Goals: To be achieved in: 12 weeks  1. Disability index score of 20% or less for the UEFS to assist with reaching prior level of function. [x]? Progressing: []? Met: []? Not Met: []? Adjusted  2. Patient will demonstrate increased AROM to WNL to allow for proper joint functioning as indicated by patients Functional Deficits. [x]? Progressing: []? Met: []? Not Met: []? Adjusted  3. Patient will demonstrate an increase in strength to good scapular and core control  for UE to allow for proper functional mobility as indicated by patients Functional Deficits. [x]? Progressing: []? Met: []?  Not Met: []? Extension PROM - 10 reps - 1 sets - 10 hold - 3x daily - 7x weekly   · Putty Squeezes - 10 reps - 1 sets - 10 hold - 3x daily - 7x weekly   · Seated Cervical Sidebending Stretch - 5 reps - 3 sets - 30 hold - 3x daily - 7x weekly   · Seated Shoulder Flexion Towel Slide at Table Top - 10 reps - 1 sets - 10 hold - 3x daily - 7x weekly   Seated Shoulder Abduction Towel Slide at Table Top - 10 reps - 1 sets - 10 hold - 3x daily - 7x weekly      PLAN:   [x] Continue per plan of care [] Alter current plan (see comments above)  [] Plan of care initiated [] Hold pending MD visit [] Discharge    Therapist was present, directed the patient's care, made skilled judgement, and was responsible for assessment and treatment of the patient. Electronically signed by:   Ganesh Anaya PT    Note: If patient does not return for scheduled/ recommended follow up visits, this note will serve as a discharge from care along with most recent update on progress.

## 2020-10-14 ENCOUNTER — HOSPITAL ENCOUNTER (OUTPATIENT)
Dept: PHYSICAL THERAPY | Age: 40
Setting detail: THERAPIES SERIES
Discharge: HOME | End: 2020-10-14
Payer: COMMERCIAL

## 2020-10-14 PROCEDURE — 97112 NEUROMUSCULAR REEDUCATION: CPT | Performed by: PHYSICAL THERAPIST

## 2020-10-14 PROCEDURE — 97110 THERAPEUTIC EXERCISES: CPT | Performed by: PHYSICAL THERAPIST

## 2020-10-14 PROCEDURE — 97140 MANUAL THERAPY 1/> REGIONS: CPT | Performed by: PHYSICAL THERAPIST

## 2020-10-15 NOTE — FLOWSHEET NOTE
occasional pain still but it seems to happen at random times. OBJECTIVE:    4/5 scaption, er      ROM PROM AROM  Comment     L R L R     Flexion      176 deg     Abduction      176 deg     ER       88 deg     IR     T12  L5     Other  (cervical)             Other                     L R Comment   Flexion         Abduction         ER         IR         Supraspinatus         Upper Trap         Lower Trap         Mid Trap         Rhomboids         Biceps         Triceps         Horizontal Abduction         Horizontal Adduction         Lats                  Reflexes/Sensation:               [x]? Dermatomes/Myotomes intact               []? Reflexes equal and normal bilaterally               []? Other:     Joint mobility:               []? Normal               [x]? Hypo               []? Hyper      Palpation:      Functional Mobility/Transfers:      Posture:      Bandages/Dressings/Incisions:     Gait: (include devices/WB status):       RESTRICTIONS/PRECAUTIONS: RCR, bicep tenodesis protocols    Exercises/Interventions: DOS 5/6/20, MD appt.  9/8/20  Exercises:  Exercise/Equipment Resistance/Repetitions Other comments   Stretching/PROM     Wand             Pulleys 5'    Tball up wall: flex, abd x20 ea                Ll/ld gentle bicep     Wall walk     Sitting SB rollout + wedge                                               Biceps     Triceps          PRE's     Supine raises  2# - 25x     Supine Chest Press     SA punch  ABC's X 30 2#  X 1 1#    External Rotation sidelying - active: 30x 1#    Side lying Abd 30x    ABC ball on wall yellow x 1     Shrugs     EXT     Reverse Flys     Serratus     Prone row / ext/m trap  X 30 3# ea; 25; 25xx 1/2     Wall mini push ups 15x     Standing raises (scaption) 15x, 15x 1#    Retraction / ext 30x ea blue    Swiss Ball Walkout 8x     Hollis on floor scaption, CCW,CW    Wall push ups  15x    Cable Column/Theraband 30x rows -20#; 30x pull downs 15#      External Rotation     Internal Rotation     Shrugs     Lats     Ext     Flex     Scapular Retraction     BIC     TRIC     PNF     Quadruped weight shifts     Mini push ups on bosu       20x        Dynamic Stability          Plyoback          Manual interventions     Prom/stm                 Therapeutic Exercise and NMR EXR  [x] (24707) Provided verbal/tactile cueing for activities related to strengthening, flexibility, endurance, ROM  for improvements in scapular, scapulothoracic and UE control with self care, reaching, carrying, lifting, house/yardwork, driving/computer work.    [] (73349) Provided verbal/tactile cueing for activities related to improving balance, coordination, kinesthetic sense, posture, motor skill, proprioception  to assist with  scapular, scapulothoracic and UE control with self care, reaching, carrying, lifting, house/yardwork, driving/computer work. Therapeutic Activities:    [] (71539 or 20651) Provided verbal/tactile cueing for activities related to improving balance, coordination, kinesthetic sense, posture, motor skill, proprioception and motor activation to allow for proper function of scapular, scapulothoracic and UE control with self care, carrying, lifting, driving/computer work.      Home Exercise Program:    [x] (13125) Reviewed/Progressed HEP activities related to strengthening, flexibility, endurance, ROM of scapular, scapulothoracic and UE control with self care, reaching, carrying, lifting, house/yardwork, driving/computer work  [] (08471) Reviewed/Progressed HEP activities related to improving balance, coordination, kinesthetic sense, posture, motor skill, proprioception of scapular, scapulothoracic and UE control with self care, reaching, carrying, lifting, house/yardwork, driving/computer work      Manual Treatments:  PROM / STM / Oscillations-Mobs:  G-I, II, III, IV (PA's, Inf., Post.)  [] (63960) Provided manual therapy to mobilize soft tissue/joints of cervical/CT, scapular GHJ and UE for the purpose of modulating pain, promoting relaxation,  increasing ROM, reducing/eliminating soft tissue swelling/inflammation/restriction, improving soft tissue extensibility and allowing for proper ROM for normal function with self care, reaching, carrying, lifting, house/yardwork, driving/computer work    Modalities:  CP 15'    Charges:  Timed Code Treatment Minutes: 48'   Total Treatment Minutes: 50'        [] EVAL (LOW) 29967 (typically 20 minutes face-to-face)  [] EVAL (MOD) 05670 (typically 30 minutes face-to-face)  [] EVAL (HIGH) 10736 (typically 45 minutes face-to-face)  [] RE-EVAL     [x] IU(67946) x  3   [] IONTO  [] NMR (37311)  X    [] VASO  [] Manual (28480) x    [] Other:  [] TA x      [] Mech Traction (54876)  [] ES(attended) (06459)      [] ES (un) (04497):     GOALS:  Patient stated goal: return to running,golf, playing with young kids    []? Progressing: []? Met: []? Not Met: []? Adjusted     Therapist goals for Patient:   Short Term Goals: To be achieved in: 2 -4 weeks  1. Independent in HEP and progression per patient tolerance, in order to prevent re-injury. [x]? Progressing: []? Met: []? Not Met: []? Adjusted   2. Patient will have a decrease in pain to facilitate improvement in movement, function, and ADLs as indicated by Functional Deficits. [x]? Progressing: []? Met: []? Not Met: []? Adjusted     Long Term Goals: To be achieved in: 12 weeks  1. Disability index score of 20% or less for the UEFS to assist with reaching prior level of function. [x]? Progressing: []? Met: []? Not Met: []? Adjusted  2. Patient will demonstrate increased AROM to WNL to allow for proper joint functioning as indicated by patients Functional Deficits. [x]? Progressing: []? Met: []? Not Met: []? Adjusted  3. Patient will demonstrate an increase in strength to good scapular and core control  for UE to allow for proper functional mobility as indicated by patients Functional Deficits. [x]? Progressing: []?  Met: []? Not Met: []? Adjusted  4. Patient will return to light adls personal hygiene, dressing, small meal prep functional activities without increased symptoms or restriction. [x]? Progressing: []? Met: []? Not Met: []? Adjusted  5. Patient will return to heavy adls yardwork, lifting greater 20+# functional activities without increased symptoms or restriction  [x]? Progressing: []? Met: []? Not Met: []? Adjusted        Overall Progression Towards Functional goals/ Treatment Progress Update:  [] Patient is progressing as expected towards functional goals listed. [] Progression is slowed due to complexities/Impairments listed. [] Progression has been slowed due to co-morbidities. [x] Plan just implemented, too soon to assess goals progression <30days   [] Goals require adjustment due to lack of progress  [] Patient is not progressing as expected and requires additional follow up with physician  [] Other    Prognosis for POC: [x] Good [] Fair  [] Poor      Patient requires continued skilled intervention: [x] Yes  [] No    Treatment/Activity Tolerance:  [x] Patient able to complete treatment  [] Patient limited by fatigue  [] Patient limited by pain     [] Patient limited by other medical complications  [] Other:   Pt demonstrated independence with current program and compliance with surgical restrictions. ASSESSMENT:  Pt has mostly normalized his AROM except for IR. He continues to need some cueing for improved recruitment of scapular retractors but otherwise progressing well with his strengthening. Patient Education:   Reviewed diagnosis, POC, HEP and its importance. HEP instruction:    Access Code: [email or web address]   URL: [email or web address]   Date: 05/12/2020   Prepared by: Florencia Pang     Exercises   · Seated Shoulder Shrugs - 10 reps - 3 sets - 1 - 2 hold - 3x daily - 7x weekly   · Seated Scapular Retraction - 10 reps - 3 sets - 1-2 hold - 3x daily - 7x weekly   · Supported Elbow Flexion Extension PROM - 10 reps - 1 sets - 10 hold - 3x daily - 7x weekly   · Putty Squeezes - 10 reps - 1 sets - 10 hold - 3x daily - 7x weekly   · Seated Cervical Sidebending Stretch - 5 reps - 3 sets - 30 hold - 3x daily - 7x weekly   · Seated Shoulder Flexion Towel Slide at Table Top - 10 reps - 1 sets - 10 hold - 3x daily - 7x weekly   Seated Shoulder Abduction Towel Slide at Table Top - 10 reps - 1 sets - 10 hold - 3x daily - 7x weekly      PLAN:   [x] Continue per plan of care [] Alter current plan (see comments above)  [] Plan of care initiated [] Hold pending MD visit [] Discharge    Therapist was present, directed the patient's care, made skilled judgement, and was responsible for assessment and treatment of the patient. Electronically signed by:       Note: If patient does not return for scheduled/ recommended follow up visits, this note will serve as a discharge from care along with most recent update on progress.

## 2020-10-20 ENCOUNTER — HOSPITAL ENCOUNTER (OUTPATIENT)
Dept: PHYSICAL THERAPY | Age: 40
Setting detail: THERAPIES SERIES
Discharge: HOME | End: 2020-10-20
Payer: COMMERCIAL

## 2020-10-20 PROCEDURE — 97140 MANUAL THERAPY 1/> REGIONS: CPT | Performed by: PHYSICAL THERAPIST

## 2020-10-20 PROCEDURE — 97112 NEUROMUSCULAR REEDUCATION: CPT | Performed by: PHYSICAL THERAPIST

## 2020-10-20 PROCEDURE — 97110 THERAPEUTIC EXERCISES: CPT | Performed by: PHYSICAL THERAPIST

## 2020-10-27 ENCOUNTER — APPOINTMENT (OUTPATIENT)
Dept: PHYSICAL THERAPY | Age: 40
End: 2020-10-27
Payer: COMMERCIAL

## 2020-10-28 ENCOUNTER — HOSPITAL ENCOUNTER (OUTPATIENT)
Dept: PHYSICAL THERAPY | Age: 40
Setting detail: THERAPIES SERIES
Discharge: HOME | End: 2020-10-28
Payer: COMMERCIAL

## 2020-10-28 PROCEDURE — 97112 NEUROMUSCULAR REEDUCATION: CPT | Performed by: PHYSICAL THERAPIST

## 2020-10-28 PROCEDURE — 97140 MANUAL THERAPY 1/> REGIONS: CPT | Performed by: PHYSICAL THERAPIST

## 2020-10-28 PROCEDURE — 97110 THERAPEUTIC EXERCISES: CPT | Performed by: PHYSICAL THERAPIST

## 2020-10-31 NOTE — FLOWSHEET NOTE
[address] [place] [facility]; Formerly [facility]  Physical Therapy Daily Treatment Note  Date: 10/28/2020  Patient Name:  Akin Rios    :  1980  MRN: [id number]  Restrictions/Precautions:  Diagnosis Information:  Diagnosis: R rotator cuff tear  Treatment Diagnosis: R shoulder pain       DATE OF PROCEDURE:  2020     OPERATIONS PERFORMED:  Right shoulder arthroscopy with labral  debridement, open subpectoral biceps tenodesis, arthroscopic subacromial  decompression with rotator cuff repair.     SURGEON: Naomi Solis MD     ASSISTANT: Mynor Muller MD     ANESTHESIA:  General.     PREOPERATIVE DIAGNOSES:  Rotator cuff tear, biceps tendinopathy with  SLAP tear and subacromial impingement.     POSTOPERATIVE DIAGNOSES:  Rotator cuff tear, biceps tendinopathy with  SLAP tear and subacromial impingement.               Insurance/Certification information:  PT Insurance Information: R UNL 80/20  Physician Information:  Referring Practitioner: Naomi Solis  Has the plan of care been signed (Y/N):        []  Yes  [x]  No     Date of Patient follow up with Physician: per MD      Is this a Progress Report:     []  Yes  [x]  No        If Yes:  Date Range for reporting period:  Beginning2020  Ending    Progress report will be due (10 Rx or 30 days whichever is less): 1229      Recertification will be due (POC Duration  / 90 days whichever is less): 2020        Visit # Insurance Allowable Auth Required   30 unl 80/20 []  Yes [x]  No        Functional Scale: UEFS 90%   Date assessed:  2020     Latex Allergy:  [x]NO      []YES  Preferred Language for Healthcare:   [x]English       []other:    Pain level:   0/10    SUBJECTIVE: progress note       OBJECTIVE:    Progress note     ROM PROM AROM  Comment     L R L R     Flexion      176 deg     Abduction      176 deg     ER       88 deg     IR     T12  L5     Other  (cervical)             Other                   L R Comment   Flexion         Abduction         ER         IR         Supraspinatus         Upper Trap         Lower Trap         Mid Trap         Rhomboids         Biceps         Triceps         Horizontal Abduction         Horizontal Adduction         Lats                  Reflexes/Sensation:               [x]? Dermatomes/Myotomes intact               []? Reflexes equal and normal bilaterally               []? Other:     Joint mobility:               []? Normal               [x]? Hypo               []? Hyper      Palpation:      Functional Mobility/Transfers:      Posture:      Bandages/Dressings/Incisions:     Gait: (include devices/WB status):       RESTRICTIONS/PRECAUTIONS: RCR, bicep tenodesis protocols    Exercises/Interventions: DOS 5/6/20, MD appt.  9/8/20  Exercises:  Exercise/Equipment Resistance/Repetitions Other comments   Stretching/PROM     Wand             Pulleys 5'    Tball up wall: flex, abd x20 ea                Ll/ld gentle bicep     Wall walk     Sitting SB rollout + wedge                                               Biceps     Triceps          PRE's     Supine raises  2# - 25x     Supine Chest Press     SA punch  ABC's X 30 2#  X 1 1#    External Rotation sidelying - active: 30x 1#    Side lying Abd 30x    ABC ball on wall yellow x 1     Shrugs     EXT     Reverse Flys     Serratus     Prone row / ext/m trap  X 30 3# ea; 25; 25xx 1/2     Wall mini push ups 15x     Standing raises (scaption) 15x, 15x 1#    Retraction / ext 30x ea blue    [employer] 8x     [place] on floor scaption, CCW,CW    Wall push ups  15x    Cable Column/Theraband 30x rows -20#; 30x pull downs 15#      External Rotation     Internal Rotation     Shrugs     Lats     Ext     Flex     Scapular Retraction     BIC     TRIC     PNF     Quadruped weight shifts     Mini push ups on bosu       20x        Dynamic Stability          Plyoback          Manual interventions     Prom/stm  10'                Therapeutic Exercise and NMR EXR  [x] (51997) Provided verbal/tactile cueing for activities related to strengthening, flexibility, endurance, ROM  for improvements in scapular, scapulothoracic and UE control with self care, reaching, carrying, lifting, house/yardwork, driving/computer work.    [] (17778) Provided verbal/tactile cueing for activities related to improving balance, coordination, kinesthetic sense, posture, motor skill, proprioception  to assist with  scapular, scapulothoracic and UE control with self care, reaching, carrying, lifting, house/yardwork, driving/computer work. Therapeutic Activities:    [] (63491 or 83499) Provided verbal/tactile cueing for activities related to improving balance, coordination, kinesthetic sense, posture, motor skill, proprioception and motor activation to allow for proper function of scapular, scapulothoracic and UE control with self care, carrying, lifting, driving/computer work.      Home Exercise Program:    [x] (96682) Reviewed/Progressed HEP activities related to strengthening, flexibility, endurance, ROM of scapular, scapulothoracic and UE control with self care, reaching, carrying, lifting, house/yardwork, driving/computer work  [] (68288) Reviewed/Progressed HEP activities related to improving balance, coordination, kinesthetic sense, posture, motor skill, proprioception of scapular, scapulothoracic and UE control with self care, reaching, carrying, lifting, house/yardwork, driving/computer work      Manual Treatments:  PROM / STM / Oscillations-Mobs:  G-I, II, III, IV (PA's, Inf., Post.)  [] (86046) Provided manual therapy to mobilize soft tissue/joints of cervical/CT, scapular GHJ and UE for the purpose of modulating pain, promoting relaxation,  increasing ROM, reducing/eliminating soft tissue swelling/inflammation/restriction, improving soft tissue extensibility and allowing for proper ROM for normal function with self care, reaching, carrying, lifting, house/yardwork, driving/computer greater 20+# functional activities without increased symptoms or restriction  [x]? Progressing: []? Met: []? Not Met: []? Adjusted        Overall Progression Towards Functional goals/ Treatment Progress Update:  [] Patient is progressing as expected towards functional goals listed. [] Progression is slowed due to complexities/Impairments listed. [] Progression has been slowed due to co-morbidities. [x] Plan just implemented, too soon to assess goals progression <30days   [] Goals require adjustment due to lack of progress  [] Patient is not progressing as expected and requires additional follow up with physician  [] Other    Prognosis for POC: [x] Good [] Fair  [] Poor      Patient requires continued skilled intervention: [x] Yes  [] No    Treatment/Activity Tolerance:  [x] Patient able to complete treatment  [] Patient limited by fatigue  [] Patient limited by pain     [] Patient limited by other medical complications  [] Other:   Pt demonstrated independence with current program and compliance with surgical restrictions. ASSESSMENT:  Pt has mostly normalized his AROM except for IR. He continues to need some cueing for improved recruitment of scapular retractors but otherwise progressing well with his strengthening. Patient Education:   Reviewed diagnosis, POC, HEP and its importance. HEP instruction:    Access Code: [email or web address]   URL: [email or web address]   Date: 05/12/2020   Prepared by: Basim Curry     Exercises   · Seated Shoulder Shrugs - 10 reps - 3 sets - 1 - 2 hold - 3x daily - 7x weekly   · Seated Scapular Retraction - 10 reps - 3 sets - 1-2 hold - 3x daily - 7x weekly   · Supported Elbow Flexion Extension PROM - 10 reps - 1 sets - 10 hold - 3x daily - 7x weekly   · Putty Squeezes - 10 reps - 1 sets - 10 hold - 3x daily - 7x weekly   · Seated Cervical Sidebending Stretch - 5 reps - 3 sets - 30 hold - 3x daily - 7x weekly   · Seated Shoulder Flexion Towel Slide at Table Top - 10 reps - 1 sets - 10 hold - 3x daily - 7x weekly   Seated Shoulder Abduction Towel Slide at Table Top - 10 reps - 1 sets - 10 hold - 3x daily - 7x weekly      PLAN:   [x] Continue per plan of care [] Alter current plan (see comments above)  [] Plan of care initiated [] Hold pending MD visit [] Discharge    Therapist was present, directed the patient's care, made skilled judgement, and was responsible for assessment and treatment of the patient. Electronically signed by:       Note: If patient does not return for scheduled/ recommended follow up visits, this note will serve as a discharge from care along with most recent update on progress.

## 2020-11-03 ENCOUNTER — HOSPITAL ENCOUNTER (OUTPATIENT)
Dept: PHYSICAL THERAPY | Age: 40
Setting detail: THERAPIES SERIES
Discharge: HOME | End: 2020-11-03
Payer: COMMERCIAL

## 2020-11-03 PROCEDURE — 97112 NEUROMUSCULAR REEDUCATION: CPT | Performed by: PHYSICAL THERAPIST

## 2020-11-03 PROCEDURE — 97110 THERAPEUTIC EXERCISES: CPT | Performed by: PHYSICAL THERAPIST

## 2020-11-03 PROCEDURE — 97140 MANUAL THERAPY 1/> REGIONS: CPT | Performed by: PHYSICAL THERAPIST

## 2020-11-04 NOTE — FLOWSHEET NOTE
[address] [place] [facility]; [facility]  Physical Therapy Daily Treatment Note  Date: 11/3/2020  Patient Name:  Kennedy Brand    :  1980  MRN: [id number]  Restrictions/Precautions:  Diagnosis Information:  Diagnosis: R rotator cuff tear  Treatment Diagnosis: R shoulder pain       DATE OF PROCEDURE:  2020     OPERATIONS PERFORMED:  Right shoulder arthroscopy with labral  debridement, open subpectoral biceps tenodesis, arthroscopic subacromial  decompression with rotator cuff repair.     SURGEON: To Mcnulty MD     ASSISTANT: Shamir Ty MD     ANESTHESIA:  General.     PREOPERATIVE DIAGNOSES:  Rotator cuff tear, biceps tendinopathy with  SLAP tear and subacromial impingement.     POSTOPERATIVE DIAGNOSES:  Rotator cuff tear, biceps tendinopathy with  SLAP tear and subacromial impingement. Insurance/Certification information:  PT Insurance Information: [facility] UNL   Physician Information:  Referring Practitioner: To Mcnulty  Has the plan of care been signed (Y/N):        []  Yes  [x]  No     Date of Patient follow up with Physician: per MD      Is this a Progress Report:     []  Yes  [x]  No        If Yes:  Date Range for reporting period:  Beginning2020  Ending    Progress report will be due (10 Rx or 30 days whichever is less): 3/41/8448      Recertification will be due (POC Duration  / 90 days whichever is less): 2020        Visit # Insurance Allowable Auth Required   31 unl 80/ []  Yes [x]  No        Functional Scale: UEFS 90%   Date assessed:  2020     Latex Allergy:  [x]NO      []YES  Preferred Language for Healthcare:   [x]English       []other:    Pain level:   0/10    SUBJECTIVE: \"doing well. Juaquin Hilt \"       OBJECTIVE:   4/5 global strength     ROM PROM AROM  Comment     L R L R     Flexion      176 deg     Abduction      176 deg     ER       88 deg     IR     T12  L5     Other  (cervical)             Other                   L R Comment   Flexion         Abduction         ER         IR         Supraspinatus         Upper Trap         Lower Trap         Mid Trap         Rhomboids         Biceps         Triceps         Horizontal Abduction         Horizontal Adduction         Lats                  Reflexes/Sensation:               [x]? Dermatomes/Myotomes intact               []? Reflexes equal and normal bilaterally               []? Other:     Joint mobility:               []? Normal               [x]? Hypo               []? Hyper      Palpation:      Functional Mobility/Transfers:      Posture:      Bandages/Dressings/Incisions:     Gait: (include devices/WB status):       RESTRICTIONS/PRECAUTIONS: RCR, bicep tenodesis protocols    Exercises/Interventions: DOS 5/6/20, MD appt.  9/8/20  Exercises:  Exercise/Equipment Resistance/Repetitions Other comments   Stretching/PROM     Wand             Pulleys 5'    Tball up wall: flex, abd x20 ea                Ll/ld gentle bicep     Wall walk     Sitting SB rollout + wedge                                               Biceps     Triceps          PRE's     Supine raises  2# - 25x     Supine Chest Press     SA punch  ABC's X 30 3#  X 1 1#    External Rotation sidelying - active: 30x 1#    Side lying Abd 30x  1#    ABC ball on wall yellow x 2; overhead - red      Shrugs     EXT     Reverse Flys     Serratus     Prone row / ext/m trap  X 30 3# ea; 25; 25xx 1/2     Wall mini push ups 30x     Standing raises (scaption) 30x 1#     Retraction / ext 30x ea blue    [employer] 8x     [place] on floor scaption, CCW,CW    Wall lift offs   15x    Cable Column/Theraband 30x rows -20#; 30x pull downs 15#      External Rotation     Internal Rotation     Shrugs     Lats     Ext     Flex     Scapular Retraction     BIC     TRIC     PNF     Modified push ups with plus       Mini push ups on bosu       20x        Dynamic Stability          Plyoback          Manual interventions     Prom/stm  15' Therapeutic Exercise and NMR EXR  [x] (19647) Provided verbal/tactile cueing for activities related to strengthening, flexibility, endurance, ROM  for improvements in scapular, scapulothoracic and UE control with self care, reaching, carrying, lifting, house/yardwork, driving/computer work.    [] (91092) Provided verbal/tactile cueing for activities related to improving balance, coordination, kinesthetic sense, posture, motor skill, proprioception  to assist with  scapular, scapulothoracic and UE control with self care, reaching, carrying, lifting, house/yardwork, driving/computer work. Therapeutic Activities:    [] (45121 or 07209) Provided verbal/tactile cueing for activities related to improving balance, coordination, kinesthetic sense, posture, motor skill, proprioception and motor activation to allow for proper function of scapular, scapulothoracic and UE control with self care, carrying, lifting, driving/computer work.      Home Exercise Program:    [x] (00993) Reviewed/Progressed HEP activities related to strengthening, flexibility, endurance, ROM of scapular, scapulothoracic and UE control with self care, reaching, carrying, lifting, house/yardwork, driving/computer work  [] (70189) Reviewed/Progressed HEP activities related to improving balance, coordination, kinesthetic sense, posture, motor skill, proprioception of scapular, scapulothoracic and UE control with self care, reaching, carrying, lifting, house/yardwork, driving/computer work      Manual Treatments:  PROM / STM / Oscillations-Mobs:  G-I, II, III, IV (PA's, Inf., Post.)  [] (25818) Provided manual therapy to mobilize soft tissue/joints of cervical/CT, scapular GHJ and UE for the purpose of modulating pain, promoting relaxation,  increasing ROM, reducing/eliminating soft tissue swelling/inflammation/restriction, improving soft tissue extensibility and allowing for proper ROM for normal function with self care, reaching, carrying, lifting, house/yardwork, driving/computer work    Modalities:  CP 15'    Charges:  Timed Code Treatment Minutes: 50'   Total Treatment Minutes: 50'        [] EVAL (LOW) 45076 (typically 20 minutes face-to-face)  [] EVAL (MOD) 22882 (typically 30 minutes face-to-face)  [] EVAL (HIGH) 88445 (typically 45 minutes face-to-face)  [] RE-EVAL     [x] IF(26510) x  1   [] IONTO  [x] NMR (15374)  X 1   [] VASO  [x] Manual (77237) x  1  [] Other:  [] TA x      [] Mech Traction (72322)  [] ES(attended) (47442)      [] ES (un) (80610):     GOALS:  Patient stated goal: return to running,golf, playing with young kids    []? Progressing: []? Met: []? Not Met: []? Adjusted     Therapist goals for Patient:   Short Term Goals: To be achieved in: 2 -4 weeks  1. Independent in HEP and progression per patient tolerance, in order to prevent re-injury. [x]? Progressing: []? Met: []? Not Met: []? Adjusted   2. Patient will have a decrease in pain to facilitate improvement in movement, function, and ADLs as indicated by Functional Deficits. [x]? Progressing: []? Met: []? Not Met: []? Adjusted     Long Term Goals: To be achieved in: 12 weeks  1. Disability index score of 20% or less for the UEFS to assist with reaching prior level of function. [x]? Progressing: []? Met: []? Not Met: []? Adjusted  2. Patient will demonstrate increased AROM to WNL to allow for proper joint functioning as indicated by patients Functional Deficits. [x]? Progressing: []? Met: []? Not Met: []? Adjusted  3. Patient will demonstrate an increase in strength to good scapular and core control  for UE to allow for proper functional mobility as indicated by patients Functional Deficits. [x]? Progressing: []? Met: []? Not Met: []? Adjusted  4. Patient will return to light adls personal hygiene, dressing, small meal prep functional activities without increased symptoms or restriction. [x]? Progressing: []? Met: []? Not Met: []? Adjusted  5.   Patient will return to heavy adls yardwork, lifting greater 20+# functional activities without increased symptoms or restriction  [x]? Progressing: []? Met: []? Not Met: []? Adjusted        Overall Progression Towards Functional goals/ Treatment Progress Update:  [] Patient is progressing as expected towards functional goals listed. [] Progression is slowed due to complexities/Impairments listed. [] Progression has been slowed due to co-morbidities. [x] Plan just implemented, too soon to assess goals progression <30days   [] Goals require adjustment due to lack of progress  [] Patient is not progressing as expected and requires additional follow up with physician  [] Other    Prognosis for POC: [x] Good [] Fair  [] Poor      Patient requires continued skilled intervention: [x] Yes  [] No    Treatment/Activity Tolerance:  [x] Patient able to complete treatment  [] Patient limited by fatigue  [] Patient limited by pain     [] Patient limited by other medical complications  [] Other:   Pt demonstrated independence with current program and compliance with surgical restrictions. ASSESSMENT:  Pt has mostly normalized his AROM except for IR. He continues to need some cueing for improved recruitment of scapular retractors but otherwise progressing well with his strengthening. Patient Education:   Reviewed diagnosis, POC, HEP and its importance. HEP instruction:    Access Code: [email or web address]   URL: [email or web address]   Date: 05/12/2020   Prepared by: Emelina Smith     Exercises   · Seated Shoulder Shrugs - 10 reps - 3 sets - 1 - 2 hold - 3x daily - 7x weekly   · Seated Scapular Retraction - 10 reps - 3 sets - 1-2 hold - 3x daily - 7x weekly   · Supported Elbow Flexion Extension PROM - 10 reps - 1 sets - 10 hold - 3x daily - 7x weekly   · Putty Squeezes - 10 reps - 1 sets - 10 hold - 3x daily - 7x weekly   · Seated Cervical Sidebending Stretch - 5 reps - 3 sets - 30 hold - 3x daily - 7x weekly · Seated Shoulder Flexion Towel Slide at Table Top - 10 reps - 1 sets - 10 hold - 3x daily - 7x weekly   Seated Shoulder Abduction Towel Slide at Table Top - 10 reps - 1 sets - 10 hold - 3x daily - 7x weekly      PLAN:   [x] Continue per plan of care [] Alter current plan (see comments above)  [] Plan of care initiated [] Hold pending MD visit [] Discharge    Therapist was present, directed the patient's care, made skilled judgement, and was responsible for assessment and treatment of the patient. Electronically signed by:       Note: If patient does not return for scheduled/ recommended follow up visits, this note will serve as a discharge from care along with most recent update on progress.

## 2020-11-17 ENCOUNTER — OFFICE VISIT (OUTPATIENT)
Dept: ORTHOPEDIC SURGERY | Age: 40
End: 2020-11-17
Payer: COMMERCIAL

## 2020-11-17 VITALS — TEMPERATURE: 97.1 F | HEIGHT: 66 IN | WEIGHT: 190 LBS | BODY MASS INDEX: 30.53 KG/M2

## 2020-11-17 PROCEDURE — 99213 OFFICE O/P EST LOW 20 MIN: CPT | Performed by: ORTHOPAEDIC SURGERY

## 2020-11-17 NOTE — PROGRESS NOTES
History of Present Illness:  Kennedy Brand is a 45 y.o. male returning back to the office for a follow up evaluation of his right shoulder. He is 6 months status post rotator cuff repair reporting today that he is doing well and has not had any issues. He has been compliant with physical therapy and feel he is making progress. Patient note that he is able to sleep ok at night and can lay on his left side without any issue. Patient note that the dull pain he had initially is gone. He is here today to discuss progress and limitations. Medical History:  Patient's medications, allergies, past medical, surgical, social and family histories were reviewed and updated as appropriate. Pertinent items are noted in HPI  Review of systems reviewed from Patient History Form dated on 11/17/2020 and available in the patient's chart under the Media tab. Vital Signs:  Vitals:    11/17/20 1050   Temp: 97.1 °F (36.2 °C)         Neuro: Alert & oriented x 3,  normal,  no focal deficits noted. Normal affect. Eyes: sclera clear  Ears: Normal external ear  Mouth:  No perioral lesions  Pulm: Respirations unlabored and regular  Pulse: Regular rate and rhythm   Skin: Warm, well perfused      Constitutional: The physical examination finds the patient to be well-developed and well-nourished. The patient is alert and oriented x3 and was cooperative throughout the visit. Right shoulder exam    Inspection:  Held in a normal posture. Normal contour at the acromioclavicular joint. No swelling, ecchymosis, or erythema about the shoulder. No atrophy appreciated. No scapular winging. Palpation:  No subacromial crepitus. No tenderness of the AC joint. No greater tuberosity tenderness. No tenderness in the bicipital groove. Range of Motion: Full passive and active ROM. Normal scapulothoracic rhythm. Strength:  Normal supraspinatus, infraspinatus, and subscapularis muscle strength. Stability: No anterior instability. No posterior instability. Special Tests: Impingement findings are negative. Labral findings are negative. Speed sign and Yergason signs are both negative. Crossover sign is negative. Belly press sign is negative. Lift off sign is negative. Other findings: The skin is warm dry and well perfused. 2+ radial pulse. Sensation is intact to light touch over the deltoid. Left  comparison shoulder exam    Inspection:  Held in a normal posture. Normal contour at the acromioclavicular joint. No swelling, ecchymosis, or erythema about the shoulder. No atrophy appreciated. No scapular winging. Palpation:  No subacromial crepitus. No tenderness of the AC joint. No greater tuberosity tenderness. No tenderness in the bicipital groove. Range of Motion: Full passive and active ROM. Normal scapulothoracic rhythm. Strength:  Normal supraspinatus, infraspinatus, and subscapularis muscle strength. Stability: No anterior instability. No posterior instability. Special Tests: Impingement findings are negative. Labral findings are negative. Speed sign and Yergason signs are both negative. Crossover sign is negative. Belly press sign is negative. Lift off sign is negative. Other findings: The skin is warm dry and well perfused. 2+ radial pulse. Sensation is intact to light touch over the deltoid. Radiology:     No new imaging obtained in the office. Assessment :  45 y.o. male making great progress 6 months following a right rotator cuff repair. Impression:  Encounter Diagnosis   Name Primary?  S/P right rotator cuff repair Yes       Office Procedures:  No orders of the defined types were placed in this encounter. Plan: Pertinent imaging was reviewed. The etiology, natural history, and treatment options for the disorder were discussed.   The roles of activity medication, antiinflammatories, injections, bracing, physical therapy, and surgical interventions were all described to the patient and questions were answered. Patient is making great progress following his surgery and looks good from a healing standpoint. At this point he is ok to do his exercises on his own. Patient is ok to ride a bike and can do some lifting, but was advised to lift in front and below shoulder level. He was told to avoid lifting out to the side and above head. We will see him back in 3 months and anticipate at that time that he will be cleared to return to full activity. Hilda Vaughna is in agreement with this plan. All questions were answered to patient's satisfaction and was encouraged to call with any further questions. Fran Castro CMA, am scribing for and in the presence of Dr. Torsten Sommers MD.    11/17/20 11:14 AM  Rigo Gage CMA           I personally reviewed the patient's pain scale, review of systems, family history, social history, past medical history, allergies and medications. Review of systems was collected today, reviewed and is included in the medical record. It is available under the media tab. I personally performed the services described in this documentation and scribed by ROXY Clarke MD  Sports Medicine, Arthroscopic Knee and Shoulder Surgery    This dictation was performed with a verbal recognition program [facility]) and it was checked for errors. It is possible that there are still dictated errors within this office note. If so, please bring any errors to my attention for an addendum. All efforts were made to ensure that this office note is accurate.

## 2020-12-31 RX ORDER — FENOFIBRATE 160 MG/1
TABLET ORAL
Qty: 90 TABLET | Refills: 5 | Status: SHIPPED | OUTPATIENT
Start: 2020-12-31 | End: 2022-09-29

## 2020-12-31 NOTE — TELEPHONE ENCOUNTER
Medication:   Requested Prescriptions     Pending Prescriptions Disp Refills    fenofibrate (TRIGLIDE) 160 MG tablet 90 tablet 5     Sig: TAKE 1 TABLET BY MOUTH ONE TIME A DAY     Last Filled: 5. 1.20    Last appt: 5/1/2020   Next appt: Visit date not found    Last OARRS: No flowsheet data found.

## 2021-02-17 ENCOUNTER — OFFICE VISIT (OUTPATIENT)
Dept: ORTHOPEDIC SURGERY | Age: 41
End: 2021-02-17
Payer: COMMERCIAL

## 2021-02-17 VITALS — TEMPERATURE: 96.9 F | BODY MASS INDEX: 31.65 KG/M2 | HEIGHT: 65 IN | WEIGHT: 190 LBS

## 2021-02-17 DIAGNOSIS — M25.511 RIGHT SHOULDER PAIN, UNSPECIFIED CHRONICITY: ICD-10-CM

## 2021-02-17 DIAGNOSIS — Z98.890 S/P RIGHT ROTATOR CUFF REPAIR: Primary | ICD-10-CM

## 2021-02-17 PROCEDURE — 99213 OFFICE O/P EST LOW 20 MIN: CPT | Performed by: ORTHOPAEDIC SURGERY

## 2021-02-17 NOTE — PROGRESS NOTES
in a normal posture. Normal contour at the acromioclavicular joint. No swelling, ecchymosis, or erythema about the shoulder. No atrophy appreciated. No scapular winging. Healed surgical incisions    Palpation:  No subacromial crepitus. No tenderness of the AC joint. No greater tuberosity tenderness. No tenderness in the bicipital groove. Range of Motion: Full passive and active ROM. Normal scapulothoracic rhythm. Strength:  Normal supraspinatus, infraspinatus, and subscapularis muscle strength. Stability: No anterior instability. No posterior instability. Special Tests: Impingement findings are negative. Labral findings are negative. Speed sign and Yergason signs are both negative. Crossover sign is negative. Belly press sign is negative. Lift off sign is negative. Other findings: The skin is warm dry and well perfused. 2+ radial pulse. Sensation is intact to light touch over the deltoid. Left comparison shoulder examination:    Inspection:  Held in a normal posture. Normal contour at the acromioclavicular joint. No swelling, ecchymosis, or erythema about the shoulder. No atrophy appreciated. No scapular winging. Palpation:  No subacromial crepitus. No tenderness of the AC joint. No greater tuberosity tenderness. No tenderness in the bicipital groove. Range of Motion: Full passive and active ROM. Normal scapulothoracic rhythm. Strength:  Normal supraspinatus, infraspinatus, and subscapularis muscle strength. Stability: No anterior instability. No posterior instability. Special Tests: Impingement findings are negative. Labral findings are negative. Speed sign and Yergason signs are both negative. Crossover sign is negative. Belly press sign is negative. Lift off sign is negative. Other findings: The skin is warm dry and well perfused. 2+ radial pulse. Sensation is intact to light touch over the deltoid.       Radiology:       Pertinent imaging interpreted and reviewed with the patient, images only - no report available. X-rays of the right shoulder including Grashey, scapular Y and axillary views were obtained and reviewed in office:    Impression: No acute fracture or dislocation. No osseous abnormalities. There is no appreciable soft tissue swelling or joint effusion. There are no lytic or blastic lesions. Button in good position. Assessment :  45 y.o. male with  9 months status post right rotator cuff repair with biceps tenodesis. Impression:  Encounter Diagnoses   Name Primary?  S/P right rotator cuff repair Yes    Right shoulder pain, unspecified chronicity        Office Procedures:  Orders Placed This Encounter   Procedures    XR SHOULDER RIGHT (MIN 2 VIEWS)     Standing Status:   Future     Number of Occurrences:   1     Standing Expiration Date:   2/16/2022     Order Specific Question:   Reason for exam:     Answer:   shoulder pain         Plan:   Pertinent imaging was reviewed. The etiology, natural history, and treatment options for the disorder were discussed. The roles of activity medication, antiinflammatories, injections, bracing, physical therapy, and surgical interventions were all described to the patient and questions were answered. Patient is doing very well for nine months out of a rotator cuff repair with a biceps tenodesis. I am happy to hear he is feeling much better than he did prior to surgery. From my standpoint he is cleared for all activity. I would like him to avoid any overhead lifting. Rosario Burnette is in agreement with this plan. All questions were answered to patient's satisfaction and was encouraged to call with any further questions. Jessica Mays [place] Jayla  2/17/2021    During this exam, I, Arielle Quintana PA-C, functioned as a scribe for Dr. Mariposa Patino. The history taking and physical examination were performed by Dr. Mariposa Patino.   All counseling during the appointment was performed between the patient and Dr. Meri Lizarraga. 2/17/2021 3:39 PM    This dictation was performed with a verbal recognition program ([employer]) and it was checked for errors. It is possible that there are still dictated errors within this office note. If so, please bring any areas to my attention for an addendum. All efforts were made to ensure that this office note is accurate. I attest that I met personally with the patient, performed the described exam, reviewed the radiographic studies and medical records associated with this patient and supervised the services that are described above.      Jeanne Favre MD

## 2021-07-02 ENCOUNTER — OFFICE VISIT (OUTPATIENT)
Dept: PRIMARY CARE CLINIC | Age: 41
End: 2021-07-02
Payer: COMMERCIAL

## 2021-07-02 VITALS
OXYGEN SATURATION: 99 % | BODY MASS INDEX: 33.82 KG/M2 | TEMPERATURE: 97.2 F | DIASTOLIC BLOOD PRESSURE: 84 MMHG | WEIGHT: 203 LBS | HEIGHT: 65 IN | SYSTOLIC BLOOD PRESSURE: 126 MMHG | RESPIRATION RATE: 15 BRPM | HEART RATE: 68 BPM

## 2021-07-02 DIAGNOSIS — L20.84 INTRINSIC ATOPIC DERMATITIS: Chronic | ICD-10-CM

## 2021-07-02 DIAGNOSIS — E78.1 HYPERTRIGLYCERIDEMIA: ICD-10-CM

## 2021-07-02 DIAGNOSIS — Z00.00 PHYSICAL EXAM, ANNUAL: ICD-10-CM

## 2021-07-02 DIAGNOSIS — Z00.00 PHYSICAL EXAM, ANNUAL: Primary | ICD-10-CM

## 2021-07-02 PROBLEM — S46.011A TRAUMATIC INCOMPLETE TEAR OF RIGHT ROTATOR CUFF: Chronic | Status: RESOLVED | Noted: 2020-05-01 | Resolved: 2021-07-02

## 2021-07-02 LAB
A/G RATIO: 1.9 (ref 1.1–2.2)
ALBUMIN SERPL-MCNC: 4.6 G/DL (ref 3.4–5)
ALP BLD-CCNC: 70 U/L (ref 40–129)
ALT SERPL-CCNC: 19 U/L (ref 10–40)
ANION GAP SERPL CALCULATED.3IONS-SCNC: 11 MMOL/L (ref 3–16)
AST SERPL-CCNC: 23 U/L (ref 15–37)
BASOPHILS ABSOLUTE: 0 K/UL (ref 0–0.2)
BASOPHILS RELATIVE PERCENT: 0.6 %
BILIRUB SERPL-MCNC: 0.4 MG/DL (ref 0–1)
BILIRUBIN URINE: NEGATIVE
BLOOD, URINE: NEGATIVE
BUN BLDV-MCNC: 16 MG/DL (ref 7–20)
CALCIUM SERPL-MCNC: 9.7 MG/DL (ref 8.3–10.6)
CHLORIDE BLD-SCNC: 104 MMOL/L (ref 99–110)
CHOLESTEROL, TOTAL: 219 MG/DL (ref 0–199)
CLARITY: CLEAR
CO2: 26 MMOL/L (ref 21–32)
COLOR: YELLOW
CREAT SERPL-MCNC: 1 MG/DL (ref 0.9–1.3)
EOSINOPHILS ABSOLUTE: 0.1 K/UL (ref 0–0.6)
EOSINOPHILS RELATIVE PERCENT: 2 %
EPITHELIAL CELLS, UA: 0 /HPF (ref 0–5)
GFR AFRICAN AMERICAN: >60
GFR NON-AFRICAN AMERICAN: >60
GLOBULIN: 2.4 G/DL
GLUCOSE BLD-MCNC: 85 MG/DL (ref 70–99)
GLUCOSE URINE: NEGATIVE MG/DL
HCT VFR BLD CALC: 42.7 % (ref 40.5–52.5)
HDLC SERPL-MCNC: 53 MG/DL (ref 40–60)
HEMOGLOBIN: 14.4 G/DL (ref 13.5–17.5)
HYALINE CASTS: 0 /LPF (ref 0–8)
KETONES, URINE: NEGATIVE MG/DL
LDL CHOLESTEROL CALCULATED: 118 MG/DL
LEUKOCYTE ESTERASE, URINE: NEGATIVE
LYMPHOCYTES ABSOLUTE: 1.8 K/UL (ref 1–5.1)
LYMPHOCYTES RELATIVE PERCENT: 36.3 %
MCH RBC QN AUTO: 30.6 PG (ref 26–34)
MCHC RBC AUTO-ENTMCNC: 33.7 G/DL (ref 31–36)
MCV RBC AUTO: 90.9 FL (ref 80–100)
MICROSCOPIC EXAMINATION: NORMAL
MONOCYTES ABSOLUTE: 0.4 K/UL (ref 0–1.3)
MONOCYTES RELATIVE PERCENT: 7.3 %
NEUTROPHILS ABSOLUTE: 2.6 K/UL (ref 1.7–7.7)
NEUTROPHILS RELATIVE PERCENT: 53.8 %
NITRITE, URINE: NEGATIVE
PDW BLD-RTO: 13.1 % (ref 12.4–15.4)
PH UA: 7 (ref 5–8)
PLATELET # BLD: 199 K/UL (ref 135–450)
PMV BLD AUTO: 8.7 FL (ref 5–10.5)
POTASSIUM SERPL-SCNC: 4.4 MMOL/L (ref 3.5–5.1)
PROTEIN UA: NEGATIVE MG/DL
RBC # BLD: 4.7 M/UL (ref 4.2–5.9)
RBC UA: 1 /HPF (ref 0–4)
SODIUM BLD-SCNC: 141 MMOL/L (ref 136–145)
SPECIFIC GRAVITY UA: 1.02 (ref 1–1.03)
TOTAL PROTEIN: 7 G/DL (ref 6.4–8.2)
TRIGL SERPL-MCNC: 238 MG/DL (ref 0–150)
TSH SERPL DL<=0.05 MIU/L-ACNC: 2.33 UIU/ML (ref 0.27–4.2)
URINE TYPE: NORMAL
UROBILINOGEN, URINE: 0.2 E.U./DL
VLDLC SERPL CALC-MCNC: 48 MG/DL
WBC # BLD: 4.8 K/UL (ref 4–11)
WBC UA: 0 /HPF (ref 0–5)

## 2021-07-02 PROCEDURE — 99396 PREV VISIT EST AGE 40-64: CPT | Performed by: INTERNAL MEDICINE

## 2021-07-02 SDOH — HEALTH STABILITY: PHYSICAL HEALTH: ON AVERAGE, HOW MANY DAYS PER WEEK DO YOU ENGAGE IN MODERATE TO STRENUOUS EXERCISE (LIKE A BRISK WALK)?: 3 DAYS

## 2021-07-02 SDOH — ECONOMIC STABILITY: HOUSING INSECURITY
IN THE LAST 12 MONTHS, WAS THERE A TIME WHEN YOU DID NOT HAVE A STEADY PLACE TO SLEEP OR SLEPT IN A SHELTER (INCLUDING NOW)?: NO

## 2021-07-02 SDOH — ECONOMIC STABILITY: TRANSPORTATION INSECURITY
IN THE PAST 12 MONTHS, HAS LACK OF TRANSPORTATION KEPT YOU FROM MEETINGS, WORK, OR FROM GETTING THINGS NEEDED FOR DAILY LIVING?: NO

## 2021-07-02 SDOH — ECONOMIC STABILITY: HOUSING INSECURITY: IN THE LAST 12 MONTHS, HOW MANY PLACES HAVE YOU LIVED?: 1

## 2021-07-02 SDOH — ECONOMIC STABILITY: INCOME INSECURITY: IN THE LAST 12 MONTHS, WAS THERE A TIME WHEN YOU WERE NOT ABLE TO PAY THE MORTGAGE OR RENT ON TIME?: NO

## 2021-07-02 SDOH — ECONOMIC STABILITY: FOOD INSECURITY: WITHIN THE PAST 12 MONTHS, THE FOOD YOU BOUGHT JUST DIDN'T LAST AND YOU DIDN'T HAVE MONEY TO GET MORE.: NEVER TRUE

## 2021-07-02 SDOH — ECONOMIC STABILITY: FOOD INSECURITY: WITHIN THE PAST 12 MONTHS, YOU WORRIED THAT YOUR FOOD WOULD RUN OUT BEFORE YOU GOT MONEY TO BUY MORE.: NEVER TRUE

## 2021-07-02 SDOH — HEALTH STABILITY: PHYSICAL HEALTH: ON AVERAGE, HOW MANY MINUTES DO YOU ENGAGE IN EXERCISE AT THIS LEVEL?: 40 MIN

## 2021-07-02 SDOH — ECONOMIC STABILITY: TRANSPORTATION INSECURITY
IN THE PAST 12 MONTHS, HAS THE LACK OF TRANSPORTATION KEPT YOU FROM MEDICAL APPOINTMENTS OR FROM GETTING MEDICATIONS?: NO

## 2021-07-02 ASSESSMENT — SOCIAL DETERMINANTS OF HEALTH (SDOH)
HOW OFTEN DO YOU GET TOGETHER WITH FRIENDS OR RELATIVES?: ONCE A WEEK
DO YOU BELONG TO ANY CLUBS OR ORGANIZATIONS SUCH AS CHURCH GROUPS UNIONS, FRATERNAL OR ATHLETIC GROUPS, OR SCHOOL GROUPS?: YES
IN A TYPICAL WEEK, HOW MANY TIMES DO YOU TALK ON THE PHONE WITH FAMILY, FRIENDS, OR NEIGHBORS?: MORE THAN THREE TIMES A WEEK
HOW OFTEN DO YOU ATTEND CHURCH OR RELIGIOUS SERVICES?: MORE THAN 4 TIMES PER YEAR
HOW OFTEN DO YOU ATTENT MEETINGS OF THE CLUB OR ORGANIZATION YOU BELONG TO?: MORE THAN 4 TIMES PER YEAR
HOW HARD IS IT FOR YOU TO PAY FOR THE VERY BASICS LIKE FOOD, HOUSING, MEDICAL CARE, AND HEATING?: NOT HARD AT ALL

## 2021-07-02 ASSESSMENT — PATIENT HEALTH QUESTIONNAIRE - PHQ9
SUM OF ALL RESPONSES TO PHQ QUESTIONS 1-9: 0
SUM OF ALL RESPONSES TO PHQ QUESTIONS 1-9: 0
SUM OF ALL RESPONSES TO PHQ9 QUESTIONS 1 & 2: 0
1. LITTLE INTEREST OR PLEASURE IN DOING THINGS: 0
SUM OF ALL RESPONSES TO PHQ QUESTIONS 1-9: 0
2. FEELING DOWN, DEPRESSED OR HOPELESS: 0

## 2021-07-02 ASSESSMENT — LIFESTYLE VARIABLES
HOW MANY STANDARD DRINKS CONTAINING ALCOHOL DO YOU HAVE ON A TYPICAL DAY: 1 OR 2
HOW OFTEN DO YOU HAVE A DRINK CONTAINING ALCOHOL: 2-3 TIMES A WEEK

## 2021-07-02 NOTE — ASSESSMENT & PLAN NOTE
On fenofibrate,  Patient is compliant w medications, no side effects, effective, provides adequate symptom relief. No new symptoms or problems as noted by patient. The problem is stable, no changes noted by patient. Will consider monitoring labs and refill medications as appropriate. Patient counseled and will continue current plan.

## 2021-07-02 NOTE — ASSESSMENT & PLAN NOTE
On dupexent and eucrissa  Patient is compliant w medications, no side effects, effective, provides adequate symptom relief. No new symptoms or problems as noted by patient. The problem is stable, no changes noted by patient. Will consider monitoring labs and refill medications as appropriate. Patient counseled and will continue current plan.

## 2021-07-02 NOTE — PROGRESS NOTES
Chief complaints:  Nisha Griffith is a 37 y.o. male who presents to the office for a general physical examination. History of present illness:  Here for a physical and fasting blood work,. Hypertriglyceridemia  On fenofibrate,  Patient is compliant w medications, no side effects, effective, provides adequate symptom relief. No new symptoms or problems as noted by patient. The problem is stable, no changes noted by patient. Will consider monitoring labs and refill medications as appropriate. Patient counseled and will continue current plan. Intrinsic atopic dermatitis  On dupexent and eucrissa  Patient is compliant w medications, no side effects, effective, provides adequate symptom relief. No new symptoms or problems as noted by patient. The problem is stable, no changes noted by patient. Will consider monitoring labs and refill medications as appropriate. Patient counseled and will continue current plan. Past Medical History:   Diagnosis Date    Eczema     Mixed hyperlipidemia 12/9/2011    Seasonal allergic rhinitis     ait in past    Traumatic incomplete tear of right rotator cuff 5/1/2020     Current Outpatient Medications   Medication Sig Dispense Refill    fenofibrate (TRIGLIDE) 160 MG tablet TAKE 1 TABLET BY MOUTH ONE TIME A DAY 90 tablet 5    DUPIXENT 300 MG/2ML SOSY injection Inject 300 mLs as directed every 14 days       Crisaborole (EUCRISA) 2 % OINT Apply twice daily to affected area(s) for facial eczema. 60 g 1     No current facility-administered medications for this visit. Allergies : Patient has no known allergies.   Past Surgical History:   Procedure Laterality Date    DENTAL SURGERY      wisdom teeth    HAND SURGERY  2008    bb removed left hand    LASIK  2008    ou    SHOULDER SURGERY Right 5/6/2020    RIGHT SHOULDER SCOPE/ ROTATOR CUFF REPAIR/ SUBPECTORIAL BICEPS TENODESIS/ SUBACROMIAL DECOMPRESSION performed by Elma Jaffe MD at [address] 02/2017     Family History   Problem Relation Age of Onset    Heart Disease Father 48        ami    High Blood Pressure Father     High Cholesterol Father     Heart Failure Paternal Grandfather [de-identified]    Heart Disease Paternal Grandfather         CHF    High Blood Pressure Paternal Grandfather     High Cholesterol Paternal Grandfather     High Blood Pressure Mother     High Cholesterol Mother     High Cholesterol Brother     Emphysema Maternal Grandmother     High Blood Pressure Maternal Grandmother     High Cholesterol Maternal Grandmother     High Blood Pressure Maternal Grandfather     High Cholesterol Maternal Grandfather     Heart Disease Paternal Grandmother         CHF    High Blood Pressure Paternal Grandmother     High Cholesterol Paternal Grandmother     High Cholesterol Brother      Social History     Tobacco Use    Smoking status: Never Smoker    Smokeless tobacco: Former User   Substance Use Topics    Alcohol use: Yes     Comment: occasional - 2-3 drinks weekly       Review of systems :  Skin: no abnormal pigmentation, rash, scaling, itching, masses, hair or nail changes  Eyes: negative  Ears/Nose/Throat: negative  Respiratory: negative  Cardiovascular: negative  Gastrointestinal: negative  Genitourinary: negative  Musculoskeletal: negative  Neurologic: negative  Psychiatric: negative  Hematologic/Lymphatic/Immunologic: negative  Endocrine: negative       Objective :     /84 (Site: Left Upper Arm, Position: Sitting, Cuff Size: Medium Adult)   Pulse 68   Temp 97.2 °F (36.2 °C)   Resp 15   Ht 5' 5\" (1.651 m)   Wt 203 lb (92.1 kg)   SpO2 99%   BMI 33.78 kg/m²   General appearance - healthy, alert, no distress  Skin - Skin color, texture, turgor normal. No rashes or lesions. Head - Normocephalic. No masses, lesions, tenderness or abnormalities  Eyes - conjunctivae/corneas clear. PERRL, EOM's intact. Ears - External ears normal. Canals clear.  TM's normal.  Nose/Sinuses - Nares normal. Septum midline. Mucosa normal. No drainage or sinus tenderness. Oropharynx - Lips, mucosa, and tongue normal. Teeth and gums normal. Oropharynx pink and patent  Neck - Neck supple. No adenopathy. Thyroid symmetric, normal size,  Back - Back symmetric, no curvature. ROM normal. No CVA tenderness. Lungs - Percussion normal. Good diaphragmatic excursion. Lungs clear  Heart - Regular rate and rhythm, with no rub, murmur or gallop noted. Abdomen - Abdomen soft, non-tender. BS normal. No masses, organomegaly  Extremities - Extremities normal. No deformities, edema, or skin discolora  Musculoskeletal - Spine ROM normal. Muscular strength intact. Peripheral pulses - radial=2+,, femoral=2+, popliteal=2+, dorsalis pedis=2+,  Neuro - Gait normal. Reflexes normal and symmetric. Sensation grossly normal.  No focal weakness       Assessment :     Physical exam  Hypertriglyceridemia  On fenofibrate,  Patient is compliant w medications, no side effects, effective, provides adequate symptom relief. No new symptoms or problems as noted by patient. The problem is stable, no changes noted by patient. Will consider monitoring labs and refill medications as appropriate. Patient counseled and will continue current plan. Intrinsic atopic dermatitis  On dupexent and eucrissa  Patient is compliant w medications, no side effects, effective, provides adequate symptom relief. No new symptoms or problems as noted by patient. The problem is stable, no changes noted by patient. Will consider monitoring labs and refill medications as appropriate. Patient counseled and will continue current plan. Plan :     Labs ordered, reviewed. Medications refilled. All Health maintenance needs reviewed and the needful ordered.        Jeanie Ulloa MD  7/2/2021 11:15 AM

## 2021-07-03 LAB
ESTIMATED AVERAGE GLUCOSE: 108.3 MG/DL
HBA1C MFR BLD: 5.4 %

## 2022-08-18 ENCOUNTER — OFFICE VISIT (OUTPATIENT)
Dept: PRIMARY CARE CLINIC | Age: 42
End: 2022-08-18
Payer: COMMERCIAL

## 2022-08-18 VITALS
BODY MASS INDEX: 34.99 KG/M2 | RESPIRATION RATE: 14 BRPM | DIASTOLIC BLOOD PRESSURE: 84 MMHG | TEMPERATURE: 97.2 F | HEIGHT: 65 IN | OXYGEN SATURATION: 99 % | WEIGHT: 210 LBS | HEART RATE: 76 BPM | SYSTOLIC BLOOD PRESSURE: 126 MMHG

## 2022-08-18 DIAGNOSIS — N52.8 OTHER MALE ERECTILE DYSFUNCTION: Chronic | ICD-10-CM

## 2022-08-18 DIAGNOSIS — E78.1 HYPERTRIGLYCERIDEMIA: ICD-10-CM

## 2022-08-18 DIAGNOSIS — Z00.00 PHYSICAL EXAM, ANNUAL: ICD-10-CM

## 2022-08-18 DIAGNOSIS — Z00.00 PHYSICAL EXAM, ANNUAL: Primary | ICD-10-CM

## 2022-08-18 DIAGNOSIS — R73.9 HYPERGLYCEMIA: ICD-10-CM

## 2022-08-18 DIAGNOSIS — J30.1 SEASONAL ALLERGIC RHINITIS DUE TO POLLEN: ICD-10-CM

## 2022-08-18 DIAGNOSIS — L20.84 INTRINSIC ATOPIC DERMATITIS: ICD-10-CM

## 2022-08-18 LAB
A/G RATIO: 2.3 (ref 1.1–2.2)
ALBUMIN SERPL-MCNC: 4.6 G/DL (ref 3.4–5)
ALP BLD-CCNC: 71 U/L (ref 40–129)
ALT SERPL-CCNC: 26 U/L (ref 10–40)
ANION GAP SERPL CALCULATED.3IONS-SCNC: 11 MMOL/L (ref 3–16)
AST SERPL-CCNC: 24 U/L (ref 15–37)
BASOPHILS ABSOLUTE: 0 K/UL (ref 0–0.2)
BASOPHILS RELATIVE PERCENT: 0.4 %
BILIRUB SERPL-MCNC: 0.5 MG/DL (ref 0–1)
BUN BLDV-MCNC: 12 MG/DL (ref 7–20)
CALCIUM SERPL-MCNC: 9.6 MG/DL (ref 8.3–10.6)
CHLORIDE BLD-SCNC: 100 MMOL/L (ref 99–110)
CHOLESTEROL, TOTAL: 228 MG/DL (ref 0–199)
CO2: 27 MMOL/L (ref 21–32)
CREAT SERPL-MCNC: 1.1 MG/DL (ref 0.9–1.3)
EOSINOPHILS ABSOLUTE: 0.1 K/UL (ref 0–0.6)
EOSINOPHILS RELATIVE PERCENT: 2.3 %
GFR AFRICAN AMERICAN: >60
GFR NON-AFRICAN AMERICAN: >60
GLUCOSE BLD-MCNC: 86 MG/DL (ref 70–99)
HCT VFR BLD CALC: 41.3 % (ref 40.5–52.5)
HDLC SERPL-MCNC: 51 MG/DL (ref 40–60)
HEMOGLOBIN: 14.1 G/DL (ref 13.5–17.5)
LDL CHOLESTEROL CALCULATED: 144 MG/DL
LYMPHOCYTES ABSOLUTE: 1.4 K/UL (ref 1–5.1)
LYMPHOCYTES RELATIVE PERCENT: 37.7 %
MCH RBC QN AUTO: 31.1 PG (ref 26–34)
MCHC RBC AUTO-ENTMCNC: 34.3 G/DL (ref 31–36)
MCV RBC AUTO: 90.9 FL (ref 80–100)
MONOCYTES ABSOLUTE: 0.3 K/UL (ref 0–1.3)
MONOCYTES RELATIVE PERCENT: 8.5 %
NEUTROPHILS ABSOLUTE: 2 K/UL (ref 1.7–7.7)
NEUTROPHILS RELATIVE PERCENT: 51.1 %
PDW BLD-RTO: 12.9 % (ref 12.4–15.4)
PLATELET # BLD: 219 K/UL (ref 135–450)
PMV BLD AUTO: 8.4 FL (ref 5–10.5)
POTASSIUM SERPL-SCNC: 4.2 MMOL/L (ref 3.5–5.1)
RBC # BLD: 4.54 M/UL (ref 4.2–5.9)
SODIUM BLD-SCNC: 138 MMOL/L (ref 136–145)
TOTAL PROTEIN: 6.6 G/DL (ref 6.4–8.2)
TRIGL SERPL-MCNC: 164 MG/DL (ref 0–150)
TSH SERPL DL<=0.05 MIU/L-ACNC: 2.74 UIU/ML (ref 0.27–4.2)
VLDLC SERPL CALC-MCNC: 33 MG/DL
WBC # BLD: 3.8 K/UL (ref 4–11)

## 2022-08-18 PROCEDURE — 99396 PREV VISIT EST AGE 40-64: CPT | Performed by: INTERNAL MEDICINE

## 2022-08-18 ASSESSMENT — PATIENT HEALTH QUESTIONNAIRE - PHQ9
SUM OF ALL RESPONSES TO PHQ9 QUESTIONS 1 & 2: 0
SUM OF ALL RESPONSES TO PHQ QUESTIONS 1-9: 0
SUM OF ALL RESPONSES TO PHQ QUESTIONS 1-9: 0
1. LITTLE INTEREST OR PLEASURE IN DOING THINGS: 0
SUM OF ALL RESPONSES TO PHQ QUESTIONS 1-9: 0
2. FEELING DOWN, DEPRESSED OR HOPELESS: 0
SUM OF ALL RESPONSES TO PHQ QUESTIONS 1-9: 0

## 2022-08-18 NOTE — PROGRESS NOTES
Chief complaints:  Sergio Hood is a 40 y.o. male who presents to the office for a general physical examination. History of present illness:  Here for a physical exam,  Hypertriglyceridemia  On fenofibrate,  Patient is compliant w medications, no side effects, effective, provides adequate symptom relief. No new symptoms or problems as noted by patient. The problem is stable, no changes noted by patient. Will consider monitoring labs and refill medications as appropriate. Patient counseled and will continue current plan. Intrinsic atopic dermatitis  On eucrissa       Allergic rhinitis  On otc meds. Other male erectile dysfunction  Will check testosterone levels. Past Medical History:   Diagnosis Date    Eczema     Mixed hyperlipidemia 12/9/2011    Seasonal allergic rhinitis     ait in past    Traumatic incomplete tear of right rotator cuff 5/1/2020     Current Outpatient Medications   Medication Sig Dispense Refill    fenofibrate (TRIGLIDE) 160 MG tablet TAKE 1 TABLET BY MOUTH ONE TIME A DAY 90 tablet 5    DUPIXENT 300 MG/2ML SOSY injection Inject 300 mLs as directed every 14 days       Crisaborole (EUCRISA) 2 % OINT Apply twice daily to affected area(s) for facial eczema. (Patient not taking: Reported on 8/18/2022) 60 g 1     No current facility-administered medications for this visit. Allergies : Patient has no known allergies.   Past Surgical History:   Procedure Laterality Date    DENTAL SURGERY      wisdom teeth    HAND SURGERY  2008    bb removed left hand    LASIK  2008    ou    SHOULDER SURGERY Right 5/6/2020    RIGHT SHOULDER SCOPE/ ROTATOR CUFF REPAIR/ SUBPECTORIAL BICEPS TENODESIS/ SUBACROMIAL DECOMPRESSION performed by Gurwinder Fletcher MD at [address] [facility]  02/2017     Family History   Problem Relation Age of Onset    Heart Disease Father 48        ami    High Blood Pressure Father     High Cholesterol Father     Heart Failure Paternal Grandfather [de-identified]    Heart Disease Paternal Grandfather         CHF    High Blood Pressure Paternal Grandfather     High Cholesterol Paternal Grandfather     High Blood Pressure Mother     High Cholesterol Mother     High Cholesterol Brother     Emphysema Maternal Grandmother     High Blood Pressure Maternal Grandmother     High Cholesterol Maternal Grandmother     High Blood Pressure Maternal Grandfather     High Cholesterol Maternal Grandfather     Heart Disease Paternal Grandmother         CHF    High Blood Pressure Paternal Grandmother     High Cholesterol Paternal Grandmother     High Cholesterol Brother      Social History     Tobacco Use    Smoking status: Never    Smokeless tobacco: Former   Substance Use Topics    Alcohol use: Yes     Comment: occasional - 2-3 drinks weekly       Review of systems :  Skin: no abnormal pigmentation, rash, scaling, itching, masses, hair or nail changes  Eyes: negative  Ears/Nose/Throat: negative  Respiratory: negative  Cardiovascular: negative  Gastrointestinal: negative  Genitourinary: negative  Musculoskeletal: negative  Neurologic: negative  Psychiatric: negative  Hematologic/Lymphatic/Immunologic: negative  Endocrine: negative       Objective :     /84 (Site: Left Upper Arm, Position: Sitting, Cuff Size: Medium Adult)   Pulse 76   Temp 97.2 °F (36.2 °C)   Resp 14   Ht 5' 5\" (1.651 m)   Wt 210 lb (95.3 kg)   SpO2 99%   BMI 34.95 kg/m²   General appearance - healthy, alert, no distress  Skin - Skin color, texture, turgor normal. No rashes or lesions. Head - Normocephalic. No masses, lesions, tenderness or abnormalities  Eyes - conjunctivae/corneas clear. PERRL, EOM's intact. Ears - External ears normal. Canals clear. TM's normal.  Nose/Sinuses - Nares normal. Septum midline. Mucosa normal. No drainage or sinus tenderness. Oropharynx - Lips, mucosa, and tongue normal. Teeth and gums normal. Oropharynx pink and patent  Neck - Neck supple. No adenopathy.  Thyroid symmetric, normal size,  Back - Back symmetric, no curvature. ROM normal. No CVA tenderness. Lungs - Percussion normal. Good diaphragmatic excursion. Lungs clear  Heart - Regular rate and rhythm, with no rub, murmur or gallop noted. Abdomen - Abdomen soft, non-tender. BS normal. No masses, organomegaly  Extremities - Extremities normal. No deformities, edema, or skin discolora  Musculoskeletal - Spine ROM normal. Muscular strength intact. Peripheral pulses - radial=2+,, femoral=2+, popliteal=2+, dorsalis pedis=2+,  Neuro - Gait normal. Reflexes normal and symmetric. Sensation grossly normal.  No focal weakness       Assessment :     Physical exam  Hypertriglyceridemia  On fenofibrate,  Patient is compliant w medications, no side effects, effective, provides adequate symptom relief. No new symptoms or problems as noted by patient. The problem is stable, no changes noted by patient. Will consider monitoring labs and refill medications as appropriate. Patient counseled and will continue current plan. Intrinsic atopic dermatitis  On eucrissa       Allergic rhinitis  On otc meds. Other male erectile dysfunction  Will check testosterone levels. Plan :     Labs ordered, reviewed. Medications refilled. All Health maintenance needs reviewed and the needful ordered.        Chad Mc MD  8/18/2022 11:02 AM

## 2022-08-19 LAB
ESTIMATED AVERAGE GLUCOSE: 114 MG/DL
HBA1C MFR BLD: 5.6 %

## 2022-08-20 LAB
SEX HORMONE BINDING GLOBULIN: 18 NMOL/L (ref 11–80)
TESTOSTERONE FREE-NONMALE: 57.5 PG/ML (ref 47–244)
TESTOSTERONE TOTAL: 221 NG/DL (ref 220–1000)

## 2022-08-22 ENCOUNTER — TELEPHONE (OUTPATIENT)
Dept: PRIMARY CARE CLINIC | Age: 42
End: 2022-08-22

## 2022-08-22 ENCOUNTER — PATIENT MESSAGE (OUTPATIENT)
Dept: PRIMARY CARE CLINIC | Age: 42
End: 2022-08-22

## 2022-08-22 DIAGNOSIS — R79.89 LOW TESTOSTERONE IN MALE: Primary | ICD-10-CM

## 2022-08-22 NOTE — TELEPHONE ENCOUNTER
From: Rafael Gandhi  To: Dr. Carlos Hyde: 8/22/2022 1:43 PM EDT  Subject: Low Testosterone Referral    Hello,    I called Dr. Concepcion Martinez office (your referral) and they stated that he doesn't specialize in this field. They said that you need to resend the referral to the Endocrinology group. .. Can you please do that?     Thanks,  Javed Colon

## 2022-08-22 NOTE — TELEPHONE ENCOUNTER
Patient needs ta new referral for Endo, stating the [facility]. The doctor that the last referral was for doesn't deal with Endo.        Thank you

## 2022-08-22 NOTE — TELEPHONE ENCOUNTER
Patient called and stated that his first referral was Dr Romana Noel office and he does not specialize in the recommended field. He then referred to Dr Wendie Ruiz . She is not available in the time he needs to. Today he stated that he wants him to referred to Dr Alvaro Redding. He already got scheduled with him in September 2022.     Please review and advice    Thanks

## 2022-09-22 ENCOUNTER — NURSE ONLY (OUTPATIENT)
Dept: PRIMARY CARE CLINIC | Age: 42
End: 2022-09-22
Payer: COMMERCIAL

## 2022-09-22 ENCOUNTER — OFFICE VISIT (OUTPATIENT)
Dept: ENDOCRINOLOGY | Age: 42
End: 2022-09-22
Payer: COMMERCIAL

## 2022-09-22 VITALS
WEIGHT: 212.2 LBS | BODY MASS INDEX: 35.35 KG/M2 | DIASTOLIC BLOOD PRESSURE: 86 MMHG | HEIGHT: 65 IN | SYSTOLIC BLOOD PRESSURE: 137 MMHG | OXYGEN SATURATION: 97 % | HEART RATE: 87 BPM

## 2022-09-22 DIAGNOSIS — Z23 NEED FOR INFLUENZA VACCINATION: Primary | ICD-10-CM

## 2022-09-22 DIAGNOSIS — N52.8 OTHER MALE ERECTILE DYSFUNCTION: ICD-10-CM

## 2022-09-22 DIAGNOSIS — N46.9 INFERTILITY MALE: ICD-10-CM

## 2022-09-22 DIAGNOSIS — R79.89 LOW TESTOSTERONE: Primary | ICD-10-CM

## 2022-09-22 PROCEDURE — 90674 CCIIV4 VAC NO PRSV 0.5 ML IM: CPT | Performed by: INTERNAL MEDICINE

## 2022-09-22 PROCEDURE — 90471 IMMUNIZATION ADMIN: CPT | Performed by: INTERNAL MEDICINE

## 2022-09-22 PROCEDURE — 99204 OFFICE O/P NEW MOD 45 MIN: CPT | Performed by: INTERNAL MEDICINE

## 2022-09-22 NOTE — PROGRESS NOTES
Patient ID:   Maryam Mckeon is a 40 y.o. male    Chief Complaint:   Maryam Mckeon is seen for initial evaluation of low Testosterone    Referred by Dr. Lawson Santos MD   Subjective:   Maryam Mckeon had TT of 221, Free T of 57.5 - Aug 2022 (checked around 11:37am). It was checked for ED. Libido is okay 5/10, performance 3/10  Has a partner   Energy good. Once or twice a week    Morning erections- rarely   Has difficulty in concentrating at work    He is a  ([place]). Has PTSD. Sees psychologist. Sleep is disturbed due to nightmares  Shaves once a week    Sore muscle after work out but no new changes   Has depression or anxiety .  Takes edibles THCs    Denies nipple discharge or gynecomastia     Never had head traumas   Fathered two children      History of drug abuse: denies   Die sit smoke  Drinks wine twice a week       The following portions of the patient's history were reviewed and updated as appropriate:        Family History   Problem Relation Age of Onset    High Blood Pressure Mother     High Cholesterol Mother     Heart Disease Father 48        ami    High Blood Pressure Father     High Cholesterol Father     High Cholesterol Brother     High Cholesterol Brother     Emphysema Maternal Grandmother     High Blood Pressure Maternal Grandmother     High Cholesterol Maternal Grandmother     High Blood Pressure Maternal Grandfather     High Cholesterol Maternal Grandfather     Heart Disease Paternal Grandmother         CHF    High Blood Pressure Paternal Grandmother     High Cholesterol Paternal Grandmother     Heart Failure Paternal Grandfather [de-identified]    Heart Disease Paternal Grandfather         CHF    High Blood Pressure Paternal Grandfather     High Cholesterol Paternal Grandfather          Social History     Socioeconomic History    Marital status:      Spouse name: Not on file    Number of children: Not on file    Years of education: Not on file    Highest education level: Not on file   Occupational History    Not on file   Tobacco Use    Smoking status: Never    Smokeless tobacco: Former   Vaping Use    Vaping Use: Not on file   Substance and Sexual Activity    Alcohol use: Yes     Comment: occasional - 2-3 drinks weekly    Drug use: No    Sexual activity: Yes     Partners: Female   Other Topics Concern    Not on file   Social History Narrative    Not on file     Social Determinants of Health     Financial Resource Strain: Not on file   Food Insecurity: Not on file   Transportation Needs: Not on file   Physical Activity: Not on file   Stress: Not on file   Social Connections: Not on file   Intimate Partner Violence: Not on file   Housing Stability: Not on file         Past Medical History:   Diagnosis Date    Eczema     Mixed hyperlipidemia 12/9/2011    Seasonal allergic rhinitis     ait in past    Traumatic incomplete tear of right rotator cuff 5/1/2020         Past Surgical History:   Procedure Laterality Date    DENTAL SURGERY      wisdom teeth    HAND SURGERY  2008    bb removed left hand    LASIK  2008    ou    SHOULDER SURGERY Right 5/6/2020    RIGHT SHOULDER SCOPE/ ROTATOR CUFF REPAIR/ SUBPECTORIAL BICEPS TENODESIS/ SUBACROMIAL DECOMPRESSION performed by Norris Johnson MD at [address]  02/2017         Allergies   Allergen Reactions    Lanolin Dermatitis and Itching    Nickel     Seasonal          Current Outpatient Medications:     Cetirizine HCl (ZYRTEC ALLERGY PO), Take by mouth as needed, Disp: , Rfl:     fenofibrate (TRIGLIDE) 160 MG tablet, TAKE 1 TABLET BY MOUTH ONE TIME A DAY, Disp: 90 tablet, Rfl: 5    DUPIXENT 300 MG/2ML SOSY injection, Inject 300 mLs as directed every 14 days , Disp: , Rfl:       Review of Systems:    Constitutional: Negative for fever, chills, and unexpected weight change. HENT: Negative for congestion, ear pain, rhinorrhea,  sore throat and trouble swallowing. Eyes: Negative for photophobia, redness, itching.    Respiratory: 244 pg/mL Final    Hemoglobin A1C 08/18/2022 5.6  See comment % Final    eAG 08/18/2022 114.0  mg/dL Final    Cholesterol, Total 08/18/2022 228 (A) 0 - 199 mg/dL Final    Triglycerides 08/18/2022 164 (A) 0 - 150 mg/dL Final    HDL 08/18/2022 51  40 - 60 mg/dL Final    LDL Calculated 08/18/2022 144 (A) <100 mg/dL Final    VLDL Cholesterol Calculated 08/18/2022 33  Not Established mg/dL Final    TSH 08/18/2022 2.74  0.27 - 4.20 uIU/mL Final    Sodium 08/18/2022 138  136 - 145 mmol/L Final    Potassium 08/18/2022 4.2  3.5 - 5.1 mmol/L Final    Chloride 08/18/2022 100  99 - 110 mmol/L Final    CO2 08/18/2022 27  21 - 32 mmol/L Final    Anion Gap 08/18/2022 11  3 - 16 Final    Glucose 08/18/2022 86  70 - 99 mg/dL Final    BUN 08/18/2022 12  7 - 20 mg/dL Final    Creatinine 08/18/2022 1.1  0.9 - 1.3 mg/dL Final    GFR Non- 08/18/2022 >60  >60 Final    GFR  08/18/2022 >60  >60 Final    Calcium 08/18/2022 9.6  8.3 - 10.6 mg/dL Final    Total Protein 08/18/2022 6.6  6.4 - 8.2 g/dL Final    Albumin 08/18/2022 4.6  3.4 - 5.0 g/dL Final    Albumin/Globulin Ratio 08/18/2022 2.3 (A) 1.1 - 2.2 Final    Total Bilirubin 08/18/2022 0.5  0.0 - 1.0 mg/dL Final    Alkaline Phosphatase 08/18/2022 71  40 - 129 U/L Final    ALT 08/18/2022 26  10 - 40 U/L Final    AST 08/18/2022 24  15 - 37 U/L Final    WBC 08/18/2022 3.8 (A) 4.0 - 11.0 K/uL Final    RBC 08/18/2022 4.54  4.20 - 5.90 M/uL Final    Hemoglobin 08/18/2022 14.1  13.5 - 17.5 g/dL Final    Hematocrit 08/18/2022 41.3  40.5 - 52.5 % Final    MCV 08/18/2022 90.9  80.0 - 100.0 fL Final    MCH 08/18/2022 31.1  26.0 - 34.0 pg Final    MCHC 08/18/2022 34.3  31.0 - 36.0 g/dL Final    RDW 08/18/2022 12.9  12.4 - 15.4 % Final    Platelets 13[birth year]19[birth year] 219  135 - 450 K/uL Final    MPV 08/18/2022 8.4  5.0 - 10.5 fL Final    Neutrophils % 08/18/2022 51.1  % Final    Lymphocytes % 08/18/2022 37.7  % Final    Monocytes % 08/18/2022 8.5  % Final    Eosinophils % 08/18/2022 2.3  % Final    Basophils % 08/18/2022 0.4  % Final    Neutrophils Absolute 08/18/2022 2.0  1.7 - 7.7 K/uL Final    Lymphocytes Absolute 08/18/2022 1.4  1.0 - 5.1 K/uL Final    Monocytes Absolute 08/18/2022 0.3  0.0 - 1.3 K/uL Final    Eosinophils Absolute 08/18/2022 0.1  0.0 - 0.6 K/uL Final    Basophils Absolute 08/18/2022 0.0  0.0 - 0.2 K/uL Final          Controlled substances monitoring: done        Assessment and Plan       Rosita Spencer was seen today for new patient. Diagnoses and all orders for this visit:    Low testosterone  -     Comprehensive Metabolic Panel; Future  -     TSH; Future  -     T4, Free; Future  -     T3, Free; Future  -     Prolactin; Future  -     Testosterone, free, total; Future  -     Estrogens, Fractionated; Future  -     Luteinizing Hormone; Future  -     Follicle Stimulating Hormone; Future    Other male erectile dysfunction  -     Comprehensive Metabolic Panel; Future  -     TSH; Future  -     T4, Free; Future  -     T3, Free; Future  -     Prolactin; Future  -     Testosterone, free, total; Future  -     Estrogens, Fractionated; Future  -     Luteinizing Hormone; Future  -     Follicle Stimulating Hormone; Future    Infertility male  -     Comprehensive Metabolic Panel; Future  -     TSH; Future  -     T4, Free; Future  -     T3, Free; Future  -     Prolactin; Future  -     Testosterone, free, total; Future  -     Estrogens, Fractionated; Future  -     Luteinizing Hormone; Future  -     Follicle Stimulating Hormone; Future          1: Male hypogonadism  / male infertility     Check LH, FSH, TT, SHBG, TSH, FT4, FT3, Prolactin, CMP, CBC, iron levels, drug screen     Will treat if T < 300 and Free T < 70     If LH and FSH are normal, we can consider clomid. Clomid is not FDA approved for treatment of hypogonadism but studies show its extremely efficacious with far less side effects.      The FDA is updating the labels for all prescription testosterone products to warn of

## 2022-09-26 DIAGNOSIS — N46.9 INFERTILITY MALE: ICD-10-CM

## 2022-09-26 DIAGNOSIS — N52.8 OTHER MALE ERECTILE DYSFUNCTION: ICD-10-CM

## 2022-09-26 DIAGNOSIS — R79.89 LOW TESTOSTERONE: ICD-10-CM

## 2022-09-26 LAB
A/G RATIO: 2.8 (ref 1.1–2.2)
ALBUMIN SERPL-MCNC: 4.8 G/DL (ref 3.4–5)
ALP BLD-CCNC: 68 U/L (ref 40–129)
ALT SERPL-CCNC: 20 U/L (ref 10–40)
ANION GAP SERPL CALCULATED.3IONS-SCNC: 11 MMOL/L (ref 3–16)
AST SERPL-CCNC: 20 U/L (ref 15–37)
BILIRUB SERPL-MCNC: 0.4 MG/DL (ref 0–1)
BUN BLDV-MCNC: 14 MG/DL (ref 7–20)
CALCIUM SERPL-MCNC: 9.3 MG/DL (ref 8.3–10.6)
CHLORIDE BLD-SCNC: 105 MMOL/L (ref 99–110)
CO2: 25 MMOL/L (ref 21–32)
CREAT SERPL-MCNC: 1 MG/DL (ref 0.9–1.3)
FOLLICLE STIMULATING HORMONE: 6.1 MIU/ML
GFR AFRICAN AMERICAN: >60
GFR NON-AFRICAN AMERICAN: >60
GLUCOSE BLD-MCNC: 91 MG/DL (ref 70–99)
LUTEINIZING HORMONE: 3.4 MIU/ML
POTASSIUM SERPL-SCNC: 4.2 MMOL/L (ref 3.5–5.1)
PROLACTIN: 16.8 NG/ML
SODIUM BLD-SCNC: 141 MMOL/L (ref 136–145)
T3 FREE: 2.9 PG/ML (ref 2.3–4.2)
T4 FREE: 1.2 NG/DL (ref 0.9–1.8)
TOTAL PROTEIN: 6.5 G/DL (ref 6.4–8.2)
TSH SERPL DL<=0.05 MIU/L-ACNC: 4.44 UIU/ML (ref 0.27–4.2)

## 2022-09-28 LAB
SEX HORMONE BINDING GLOBULIN: 18 NMOL/L (ref 11–80)
TESTOSTERONE FREE-NONMALE: 65.1 PG/ML (ref 47–244)
TESTOSTERONE TOTAL: 248 NG/DL (ref 220–1000)

## 2022-09-29 DIAGNOSIS — N46.9 INFERTILITY MALE: ICD-10-CM

## 2022-09-29 DIAGNOSIS — R79.89 LOW TESTOSTERONE: Primary | ICD-10-CM

## 2022-09-29 LAB
ESTRADIOL LEVEL: 12.5 PG/ML (ref 10–42)
ESTROGEN TOTAL: 34.7 PG/ML (ref 19–69)
ESTRONE: 22.2 PG/ML (ref 9–36)

## 2022-09-29 RX ORDER — FENOFIBRATE 160 MG/1
TABLET ORAL
Qty: 90 TABLET | Refills: 5 | Status: SHIPPED | OUTPATIENT
Start: 2022-09-29

## 2022-09-29 NOTE — TELEPHONE ENCOUNTER
Medication:   Requested Prescriptions     Pending Prescriptions Disp Refills    fenofibrate (TRIGLIDE) 160 MG tablet [Pharmacy Med Name: Fenofibrate 160 MG Oral Tablet] 90 tablet 5     Sig: TAKE 1 TABLET BY MOUTH ONCE DAILY     Last Filled:  12/31/2020    Last appt: 8/18/2022   Next appt: Visit date not found    Last OARRS: No flowsheet data found.

## 2022-12-21 DIAGNOSIS — N46.9 INFERTILITY MALE: ICD-10-CM

## 2022-12-21 DIAGNOSIS — R79.89 LOW TESTOSTERONE: ICD-10-CM

## 2022-12-21 NOTE — TELEPHONE ENCOUNTER
Requested Refill:   Requested Prescriptions     Pending Prescriptions Disp Refills    clomiPHENE (CLOMID) 50 MG tablet 20 tablet 1     Si mg orally three days a week (Monday, Wednesday and Friday)       Last refilled: 2022 # 20 tablets with 1 refills     Last Appt: 2022  Next Appt: 2022

## 2023-01-05 ENCOUNTER — TELEPHONE (OUTPATIENT)
Dept: ENDOCRINOLOGY | Age: 43
End: 2023-01-05

## 2023-01-05 NOTE — TELEPHONE ENCOUNTER
Left a message for Mr. Caron Ruffinqianakaylin to contact the office. He is over due for follow up. Refill request received for Clomid which will be send after follow up is scheduled.

## 2023-02-13 ENCOUNTER — PATIENT MESSAGE (OUTPATIENT)
Dept: ENDOCRINOLOGY | Age: 43
End: 2023-02-13

## 2023-02-13 NOTE — TELEPHONE ENCOUNTER
From: Megan Section  To: Dr. Jeovanny Montoya: 2/13/2023 3:42 PM EST  Subject: Follow Up    From April B on Jan. 5th,  \"[facility],     Dr. Lizzy Acosta stated we can submit the refill just be sure to schedule follow up 6-8 weeks after starting the medication ok. Have a blessed day! April \"    I would like to schedule a follow up so that I can continue my medication. I expect that my testosterone levels will need to be retested and data analyzed prior to the follow up. Can you please put an order in for labs, so I can get that done prior to the follow up appointment?     Delta Osborne

## 2023-02-14 ENCOUNTER — PATIENT MESSAGE (OUTPATIENT)
Dept: ENDOCRINOLOGY | Age: 43
End: 2023-02-14

## 2023-02-14 DIAGNOSIS — R79.89 LOW TESTOSTERONE: Primary | ICD-10-CM

## 2023-02-14 DIAGNOSIS — N46.9 INFERTILITY MALE: ICD-10-CM

## 2023-02-14 DIAGNOSIS — R79.89 HIGH SERUM THYROID STIMULATING HORMONE (TSH): Primary | ICD-10-CM

## 2023-02-16 DIAGNOSIS — R79.89 LOW TESTOSTERONE: ICD-10-CM

## 2023-02-16 DIAGNOSIS — R79.89 HIGH SERUM THYROID STIMULATING HORMONE (TSH): ICD-10-CM

## 2023-02-16 DIAGNOSIS — N46.9 INFERTILITY MALE: ICD-10-CM

## 2023-02-16 LAB
A/G RATIO: 1.9 (ref 1.1–2.2)
ALBUMIN SERPL-MCNC: 4 G/DL (ref 3.4–5)
ALP BLD-CCNC: 66 U/L (ref 40–129)
ALT SERPL-CCNC: 14 U/L (ref 10–40)
ANION GAP SERPL CALCULATED.3IONS-SCNC: 7 MMOL/L (ref 3–16)
ANTI-THYROGLOB ABS: 12 IU/ML
AST SERPL-CCNC: 16 U/L (ref 15–37)
BASOPHILS ABSOLUTE: 0 K/UL (ref 0–0.2)
BASOPHILS RELATIVE PERCENT: 0 %
BILIRUB SERPL-MCNC: <0.2 MG/DL (ref 0–1)
BUN BLDV-MCNC: 12 MG/DL (ref 7–20)
CALCIUM SERPL-MCNC: 9.2 MG/DL (ref 8.3–10.6)
CHLORIDE BLD-SCNC: 102 MMOL/L (ref 99–110)
CO2: 27 MMOL/L (ref 21–32)
CREAT SERPL-MCNC: 1 MG/DL (ref 0.9–1.3)
EOSINOPHILS ABSOLUTE: 0.2 K/UL (ref 0–0.6)
EOSINOPHILS RELATIVE PERCENT: 3 %
GFR SERPL CREATININE-BSD FRML MDRD: >60 ML/MIN/{1.73_M2}
GLUCOSE BLD-MCNC: 92 MG/DL (ref 70–99)
HCT VFR BLD CALC: 44.8 % (ref 40.5–52.5)
HEMOGLOBIN: 14.6 G/DL (ref 13.5–17.5)
LYMPHOCYTES ABSOLUTE: 1.4 K/UL (ref 1–5.1)
LYMPHOCYTES RELATIVE PERCENT: 28 %
MCH RBC QN AUTO: 30 PG (ref 26–34)
MCHC RBC AUTO-ENTMCNC: 32.6 G/DL (ref 31–36)
MCV RBC AUTO: 91.8 FL (ref 80–100)
MONOCYTES ABSOLUTE: 0.4 K/UL (ref 0–1.3)
MONOCYTES RELATIVE PERCENT: 7 %
NEUTROPHILS ABSOLUTE: 3.1 K/UL (ref 1.7–7.7)
NEUTROPHILS RELATIVE PERCENT: 62 %
PDW BLD-RTO: 13.1 % (ref 12.4–15.4)
PLATELET # BLD: 246 K/UL (ref 135–450)
PLATELET SLIDE REVIEW: ADEQUATE
PMV BLD AUTO: 9.1 FL (ref 5–10.5)
POTASSIUM SERPL-SCNC: 4.5 MMOL/L (ref 3.5–5.1)
RBC # BLD: 4.88 M/UL (ref 4.2–5.9)
RBC # BLD: NORMAL 10*6/UL
SLIDE REVIEW: NORMAL
SODIUM BLD-SCNC: 136 MMOL/L (ref 136–145)
T3 FREE: 2.8 PG/ML (ref 2.3–4.2)
T4 FREE: 1.1 NG/DL (ref 0.9–1.8)
THYROID PEROXIDASE (TPO) ABS: 7 IU/ML
TOTAL PROTEIN: 6.1 G/DL (ref 6.4–8.2)
TSH SERPL DL<=0.05 MIU/L-ACNC: 2.75 UIU/ML (ref 0.27–4.2)
WBC # BLD: 5 K/UL (ref 4–11)

## 2023-02-18 LAB
SEX HORMONE BINDING GLOBULIN: 21 NMOL/L (ref 17–56)
TESTOSTERONE FREE PERCENT: 2.4 % (ref 1.6–2.9)
TESTOSTERONE FREE, CALC: 126 PG/ML (ref 47–244)
TESTOSTERONE TOTAL-MALE: 520 NG/DL (ref 300–890)
TESTOSTERONE, BIOAVAILABLE: 336 NG/DL (ref 131–682)

## 2023-02-19 DIAGNOSIS — R79.89 LOW TESTOSTERONE: ICD-10-CM

## 2023-02-19 DIAGNOSIS — N46.9 INFERTILITY MALE: ICD-10-CM

## 2023-02-20 NOTE — TELEPHONE ENCOUNTER
Requested Refill:   Requested Prescriptions     Pending Prescriptions Disp Refills    clomiPHENE (CLOMID) 50 MG tablet [Pharmacy Med Name: CLOMID  50MG  TAB] 20 tablet 1     Sig: TAKE 1 TABLET BY MOUTH 3 TIMES  WEEKLY ON Anshul Craig       Last refilled: 9/29/2022 # 20 with 1 refill     Last Appt: 9/22/2022  Next Appt: 2/22/2023

## 2023-02-22 ENCOUNTER — OFFICE VISIT (OUTPATIENT)
Dept: ENDOCRINOLOGY | Age: 43
End: 2023-02-22
Payer: COMMERCIAL

## 2023-02-22 VITALS
DIASTOLIC BLOOD PRESSURE: 104 MMHG | HEART RATE: 76 BPM | HEIGHT: 65 IN | BODY MASS INDEX: 36.25 KG/M2 | OXYGEN SATURATION: 98 % | WEIGHT: 217.6 LBS | SYSTOLIC BLOOD PRESSURE: 142 MMHG

## 2023-02-22 DIAGNOSIS — N46.9 INFERTILITY MALE: ICD-10-CM

## 2023-02-22 DIAGNOSIS — R79.89 LOW TESTOSTERONE: Primary | ICD-10-CM

## 2023-02-22 DIAGNOSIS — R79.89 HIGH SERUM THYROID STIMULATING HORMONE (TSH): ICD-10-CM

## 2023-02-22 PROCEDURE — 99214 OFFICE O/P EST MOD 30 MIN: CPT | Performed by: INTERNAL MEDICINE

## 2023-02-22 RX ORDER — ACETAMINOPHEN 80 MG
TABLET,CHEWABLE ORAL
Qty: 1 EACH | Refills: 0 | Status: SHIPPED | OUTPATIENT
Start: 2023-02-22 | End: 2023-02-24

## 2023-02-22 NOTE — PROGRESS NOTES
Patient ID:   Gina Morales is a 44 y.o. male    Chief Complaint:   Gina Morales is seen for an evaluation of low Testosterone    Referred by Dr. Ernesto Reyes MD   Subjective:   Gina Morales had TT of 221, Free T of 57.5 - Aug 2022 (checked around 11:37am). It was checked for ED. Clomid 50 mg three days a week (since first week of Jan 2023)     Libido is better 5>7/10, performance 3 >6 /10  Has a partner   Energy are same   Morning erections- more frequently    Has difficulty in concentrating at work. He is a  ([place]). Has PTSD. Sees psychologist. Sleep is disturbed due to nightmares  Shaves twice a week    Sore muscle after work out but no new changes   Has stable depression or anxiety . Takes edibles THCs    FOV:     Denies nipple discharge or gynecomastia     Never had head traumas   Fathered two children. No plans to have more kids. S/p vasectomy.      History of drug abuse: denies   Die sit smoke  Drinks wine twice a week       The following portions of the patient's history were reviewed and updated as appropriate:        Family History   Problem Relation Age of Onset    High Blood Pressure Mother     High Cholesterol Mother     Heart Disease Father 48        ami    High Blood Pressure Father     High Cholesterol Father     High Cholesterol Brother     High Cholesterol Brother     Emphysema Maternal Grandmother     High Blood Pressure Maternal Grandmother     High Cholesterol Maternal Grandmother     High Blood Pressure Maternal Grandfather     High Cholesterol Maternal Grandfather     Heart Disease Paternal Grandmother         CHF    High Blood Pressure Paternal Grandmother     High Cholesterol Paternal Grandmother     Heart Failure Paternal Grandfather [de-identified]    Heart Disease Paternal Grandfather         CHF    High Blood Pressure Paternal Grandfather     High Cholesterol Paternal Grandfather          Social History     Socioeconomic History    Marital status:  Spouse name: Not on file    Number of children: Not on file    Years of education: Not on file    Highest education level: Not on file   Occupational History    Not on file   Tobacco Use    Smoking status: Never    Smokeless tobacco: Former   Vaping Use    Vaping Use: Not on file   Substance and Sexual Activity    Alcohol use: Yes     Comment: occasional - 2-3 drinks weekly    Drug use: No    Sexual activity: Yes     Partners: Female   Other Topics Concern    Not on file   Social History Narrative    Not on file     Social Determinants of Health     Financial Resource Strain: Not on file   Food Insecurity: Not on file   Transportation Needs: Not on file   Physical Activity: Not on file   Stress: Not on file   Social Connections: Not on file   Intimate Partner Violence: Not on file   Housing Stability: Not on file         Past Medical History:   Diagnosis Date    Eczema     Mixed hyperlipidemia 12/9/2011    Seasonal allergic rhinitis     ait in past    Traumatic incomplete tear of right rotator cuff 5/1/2020         Past Surgical History:   Procedure Laterality Date    DENTAL SURGERY      wisdom teeth    HAND SURGERY  2008    bb removed left hand    LASIK  2008    ou    SHOULDER SURGERY Right 5/6/2020    RIGHT SHOULDER SCOPE/ ROTATOR CUFF REPAIR/ SUBPECTORIAL BICEPS TENODESIS/ SUBACROMIAL DECOMPRESSION performed by Lefty Pulido MD at [address] [place] [address]  02/2017         Allergies   Allergen Reactions    Lanolin Dermatitis and Itching    Nickel     Seasonal          Current Outpatient Medications:     clomiPHENE (CLOMID) 50 MG tablet, 50 mg orally three days a week (Monday, Wednesday and Friday), Disp: 45 tablet, Rfl: 1    fenofibrate (TRIGLIDE) 160 MG tablet, TAKE 1 TABLET BY MOUTH ONCE DAILY, Disp: 90 tablet, Rfl: 5    Cetirizine HCl (ZYRTEC ALLERGY PO), Take by mouth as needed, Disp: , Rfl:     DUPIXENT 300 MG/2ML SOSY injection, Inject 300 mLs as directed every 14 days , Disp: , Rfl:       Review of Systems:    Constitutional: Negative for fever, chills, and unexpected weight change. HENT: Negative for congestion, ear pain, rhinorrhea,  sore throat and trouble swallowing. Eyes: Negative for photophobia, redness, itching. Respiratory: Negative for cough, shortness of breath and sputum. Cardiovascular: Negative for chest pain, palpitations and leg swelling. Gastrointestinal: Negative for nausea, vomiting, abdominal pain, diarrhea, constipation. Endocrine: Negative for cold intolerance, heat intolerance, polydipsia, polyphagia and polyuria. Genitourinary: Negative for dysuria, urgency, frequency, hematuria and flank pain. Musculoskeletal: Negative for myalgias, back pain, arthralgias and neck pain. Skin/Nail: Negative for rash, itching. Normal nails. Neurological: Negative for seizures, weakness, light-headedness, numbness. Has headaches associated with anxiety. Hematological/ Lymph nodes: Negative for adenopathy. Does not bruise/bleed easily. Psychiatric/Behavioral: Negative for suicidal ideas. Objective:   Physical Exam:    BP (!) 144/100 (Site: Left Upper Arm, Position: Sitting, Cuff Size: Large Adult)   Pulse 76   Ht 5' 5\" (1.651 m)   Wt 217 lb 9.6 oz (98.7 kg)   SpO2 98%   BMI 36.21 kg/m²     Constitutional: Patient is oriented to person, place, and time. Patient appears well-developed and well-nourished. HENT:    Head: Normocephalic and atraumatic. Eyes: Conjunctivae and EOM are normal.     Neck: Normal range of motion. Cardiovascular: Normal rate, regular rhythm and normal heart sounds. Pulmonary/Chest: Effort normal and breath sounds normal.   Musculoskeletal: Normal range of motion. Neurological: Patient is alert and oriented to person, place, and time. Patient has normal reflexes. No hand tremors. Skin: Skin is warm and dry. Psychiatric: Patient has a normal mood and affect.  Patient behavior is normal.   Genitals: testes 20 ml bilaterally     Lab Review:      Orders Only on 02/16/2023   Component Date Value Ref Range Status    Anti-Thyroglob Abs 02/16/2023 12  <115 IU/mL Final    Thyroid Peroxidase (TPO) Abs 02/16/2023 7  <34 IU/mL Final    T3, Free 02/16/2023 2.8  2.3 - 4.2 pg/mL Final    T4 Free 02/16/2023 1.1  0.9 - 1.8 ng/dL Final    TSH 02/16/2023 2.75  0.27 - 4.20 uIU/mL Final    Testosterone Free, Calc 02/16/2023 126  47 - 244 pg/mL Final    Testosterone % Free 02/16/2023 2.4  1.6 - 2.9 % Final    Total Testosterone 02/16/2023 520  300 - 890 ng/dL Final    Testosterone, Bioavailable 02/16/2023 336  131 - 682 ng/dL Final    Sex Hormone Binding 02/16/2023 21  17 - 56 nmol/L Final    Sodium 02/16/2023 136  136 - 145 mmol/L Final    Potassium 02/16/2023 4.5  3.5 - 5.1 mmol/L Final    Chloride 02/16/2023 102  99 - 110 mmol/L Final    CO2 02/16/2023 27  21 - 32 mmol/L Final    Anion Gap 02/16/2023 7  3 - 16 Final    Glucose 02/16/2023 92  70 - 99 mg/dL Final    BUN 02/16/2023 12  7 - 20 mg/dL Final    Creatinine 02/16/2023 1.0  0.9 - 1.3 mg/dL Final    Est, Glom Filt Rate 02/16/2023 >60  >60 Final    Calcium 02/16/2023 9.2  8.3 - 10.6 mg/dL Final    Total Protein 02/16/2023 6.1 (A)  6.4 - 8.2 g/dL Final    Albumin 02/16/2023 4.0  3.4 - 5.0 g/dL Final    Albumin/Globulin Ratio 02/16/2023 1.9  1.1 - 2.2 Final    Total Bilirubin 02/16/2023 <0.2  0.0 - 1.0 mg/dL Final    Alkaline Phosphatase 02/16/2023 66  40 - 129 U/L Final    ALT 02/16/2023 14  10 - 40 U/L Final    AST 02/16/2023 16  15 - 37 U/L Final    WBC 02/16/2023 5.0  4.0 - 11.0 K/uL Final    RBC 02/16/2023 4.88  4.20 - 5.90 M/uL Final    Hemoglobin 02/16/2023 14.6  13.5 - 17.5 g/dL Final    Hematocrit 02/16/2023 44.8  40.5 - 52.5 % Final    MCV 02/16/2023 91.8  80.0 - 100.0 fL Final    MCH 02/16/2023 30.0  26.0 - 34.0 pg Final    MCHC 02/16/2023 32.6  31.0 - 36.0 g/dL Final    RDW 02/16/2023 13.1  12.4 - 15.4 % Final    Platelets 79/92/2882 246  135 - 450 K/uL Final    MPV 02/16/2023 9.1  5.0 - 10.5 fL Final    PLATELET SLIDE REVIEW 02/16/2023 Adequate   Final    SLIDE REVIEW 02/16/2023 see below   Final    Neutrophils % 02/16/2023 62.0  % Final    Lymphocytes % 02/16/2023 28.0  % Final    Monocytes % 02/16/2023 7.0  % Final    Eosinophils % 02/16/2023 3.0  % Final    Basophils % 02/16/2023 0.0  % Final    Neutrophils Absolute 02/16/2023 3.1  1.7 - 7.7 K/uL Final    Lymphocytes Absolute 02/16/2023 1.4  1.0 - 5.1 K/uL Final    Monocytes Absolute 02/16/2023 0.4  0.0 - 1.3 K/uL Final    Eosinophils Absolute 02/16/2023 0.2  0.0 - 0.6 K/uL Final    Basophils Absolute 02/16/2023 0.0  0.0 - 0.2 K/uL Final    RBC Morphology 02/16/2023 Normal   Final   Orders Only on 09/26/2022   Component Date Value Ref Range Status    Prolactin 09/26/2022 16.8  ng/mL Final    T3, Free 09/26/2022 2.9  2.3 - 4.2 pg/mL Final    T4 Free 09/26/2022 1.2  0.9 - 1.8 ng/dL Final    TSH 09/26/2022 4.44 (A)  0.27 - 4.20 uIU/mL Final    Sodium 09/26/2022 141  136 - 145 mmol/L Final    Potassium 09/26/2022 4.2  3.5 - 5.1 mmol/L Final    Chloride 09/26/2022 105  99 - 110 mmol/L Final    CO2 09/26/2022 25  21 - 32 mmol/L Final    Anion Gap 09/26/2022 11  3 - 16 Final    Glucose 09/26/2022 91  70 - 99 mg/dL Final    BUN 09/26/2022 14  7 - 20 mg/dL Final    Creatinine 09/26/2022 1.0  0.9 - 1.3 mg/dL Final    GFR Non- 09/26/2022 >60  >60 Final    GFR  09/26/2022 >60  >60 Final    Calcium 09/26/2022 9.3  8.3 - 10.6 mg/dL Final    Total Protein 09/26/2022 6.5  6.4 - 8.2 g/dL Final    Albumin 09/26/2022 4.8  3.4 - 5.0 g/dL Final    Albumin/Globulin Ratio 09/26/2022 2.8 (A)  1.1 - 2.2 Final    Total Bilirubin 09/26/2022 0.4  0.0 - 1.0 mg/dL Final    Alkaline Phosphatase 09/26/2022 68  40 - 129 U/L Final    ALT 09/26/2022 20  10 - 40 U/L Final    AST 09/26/2022 20  15 - 37 U/L Final    FSH 09/26/2022 6.1  mIU/mL Final    LH 09/26/2022 3.4  mIU/mL Final    Estradiol 09/26/2022 12.5  10.0 - 42.0 pg/mL Final    Estrone 09/26/2022 22.2  9.0 - 36.0 pg/mL Final    Estrogen Total 09/26/2022 34.7  19.0 - 69.0 pg/mL Final    Testosterone 09/26/2022 248  220 - 1,000 ng/dL Final    Sex Hormone Binding 09/26/2022 18  11 - 80 nmol/L Final    Testosterone, Free 09/26/2022 65.1  47 - 244 pg/mL Final   Orders Only on 08/18/2022   Component Date Value Ref Range Status    Testosterone 08/18/2022 221  220 - 1,000 ng/dL Final    Sex Hormone Binding 08/18/2022 18  11 - 80 nmol/L Final    Testosterone, Free 08/18/2022 57.5  47 - 244 pg/mL Final    Hemoglobin A1C 08/18/2022 5.6  See comment % Final    eAG 08/18/2022 114.0  mg/dL Final    Cholesterol, Total 08/18/2022 228 (A)  0 - 199 mg/dL Final    Triglycerides 08/18/2022 164 (A)  0 - 150 mg/dL Final    HDL 08/18/2022 51  40 - 60 mg/dL Final    LDL Calculated 08/18/2022 144 (A)  <100 mg/dL Final    VLDL Cholesterol Calculated 08/18/2022 33  Not Established mg/dL Final    TSH 08/18/2022 2.74  0.27 - 4.20 uIU/mL Final    Sodium 08/18/2022 138  136 - 145 mmol/L Final    Potassium 08/18/2022 4.2  3.5 - 5.1 mmol/L Final    Chloride 08/18/2022 100  99 - 110 mmol/L Final    CO2 08/18/2022 27  21 - 32 mmol/L Final    Anion Gap 08/18/2022 11  3 - 16 Final    Glucose 08/18/2022 86  70 - 99 mg/dL Final    BUN 08/18/2022 12  7 - 20 mg/dL Final    Creatinine 08/18/2022 1.1  0.9 - 1.3 mg/dL Final    GFR Non- 08/18/2022 >60  >60 Final    GFR  08/18/2022 >60  >60 Final    Calcium 08/18/2022 9.6  8.3 - 10.6 mg/dL Final    Total Protein 08/18/2022 6.6  6.4 - 8.2 g/dL Final    Albumin 08/18/2022 4.6  3.4 - 5.0 g/dL Final    Albumin/Globulin Ratio 08/18/2022 2.3 (A)  1.1 - 2.2 Final    Total Bilirubin 08/18/2022 0.5  0.0 - 1.0 mg/dL Final    Alkaline Phosphatase 08/18/2022 71  40 - 129 U/L Final    ALT 08/18/2022 26  10 - 40 U/L Final    AST 08/18/2022 24  15 - 37 U/L Final    WBC 08/18/2022 3.8 (A)  4.0 - 11.0 K/uL Final    RBC 08/18/2022 4.54  4.20 - 5.90 M/uL Final    Hemoglobin 08/18/2022 14.1 13.5 - 17.5 g/dL Final    Hematocrit 08/18/2022 41.3  40.5 - 52.5 % Final    MCV 08/18/2022 90.9  80.0 - 100.0 fL Final    MCH 08/18/2022 31.1  26.0 - 34.0 pg Final    MCHC 08/18/2022 34.3  31.0 - 36.0 g/dL Final    RDW 08/18/2022 12.9  12.4 - 15.4 % Final    Platelets 93/65/8035 219  135 - 450 K/uL Final    MPV 08/18/2022 8.4  5.0 - 10.5 fL Final    Neutrophils % 08/18/2022 51.1  % Final    Lymphocytes % 08/18/2022 37.7  % Final    Monocytes % 08/18/2022 8.5  % Final    Eosinophils % 08/18/2022 2.3  % Final    Basophils % 08/18/2022 0.4  % Final    Neutrophils Absolute 08/18/2022 2.0  1.7 - 7.7 K/uL Final    Lymphocytes Absolute 08/18/2022 1.4  1.0 - 5.1 K/uL Final    Monocytes Absolute 08/18/2022 0.3  0.0 - 1.3 K/uL Final    Eosinophils Absolute 08/18/2022 0.1  0.0 - 0.6 K/uL Final    Basophils Absolute 08/18/2022 0.0  0.0 - 0.2 K/uL Final          Controlled substances monitoring: done        Assessment and Plan       Bonilla Condon was seen today for follow-up. Diagnoses and all orders for this visit:    Low testosterone  -     clomiPHENE (CLOMID) 50 MG tablet; 50 mg orally three days a week (Monday, Wednesday and Friday)  -     CBC with Auto Differential; Future  -     Comprehensive Metabolic Panel; Future  -     Testosterone, free, total; Future  -     PSA Screening; Future    Infertility male  -     clomiPHENE (CLOMID) 50 MG tablet; 50 mg orally three days a week (Monday, Wednesday and Friday)  -     CBC with Auto Differential; Future  -     Comprehensive Metabolic Panel; Future  -     Testosterone, free, total; Future  -     PSA Screening; Future    High serum thyroid stimulating hormone (TSH)  -     CBC with Auto Differential; Future  -     Comprehensive Metabolic Panel; Future  -     Testosterone, free, total; Future  -     PSA Screening;  Future    1: Male hypogonadism  / male infertility   , Free T 5.1 - Sep 2022   TSH 4.44, Free T4 1.2, Free T3 2.9 - Sep 2022   Other labs normal including estrogens in f Sep 2022      - Feb 2023   CBC, CMP normal - Feb 2023      Change Clomid to 25 mg or half tab three days a week     The FDA is updating the labels for all prescription testosterone products to warn of their potential for abuse and dependence. Adverse effects linked to testosterone abuse include myocardial infarction, heart failure, stroke, male infertility, depression,  Aggression,  blood clots, prostate hyperplasia/cancer, sleep apnea, acne, and breast enlargement. Men who use a testosterone gel should wash their hands thoroughly after applying a dose and make sure that no one else touches the spots where they medicate. If a woman or child comes into contact with testosterone gels, it can cause side effects in them, including hair growth and premature puberty. Testosterone abuse often occurs with other anabolic androgenic steroids and at higher doses than what is prescribed. Withdrawal symptoms have included fatigue, irritability, loss of appetite, reduced libido, and insomnia.     2: ED   If Testosterone work up is normal   Will send Viagra or Cialis prn     3: High TSH   Repeat TSH normal with thyroid antibodies - Feb 2023   Repeat levels in 12 years       RTC in 6 months with CBC, CMP, TT, PSA       Electronically signed by Gracie Méndez MD on 2/22/2023 at 11:18 AM

## 2023-02-24 RX ORDER — ACETAMINOPHEN 80 MG
TABLET,CHEWABLE ORAL
Qty: 1 EACH | Refills: 1 | Status: SHIPPED | OUTPATIENT
Start: 2023-02-24

## 2023-02-24 NOTE — TELEPHONE ENCOUNTER
From: Travon Rodney  Sent: 2/24/2023 9:09 AM EST  To: [facility]  Subject: Blood work for upcoming appointment    Hi April,    Dr. Irma Tovar prescribed a pill splitter for me, however, I have been informed that [facility] doesn't provide theat and that I would have to go to a local pharmacy for that. Can you please send the prescription to the [facility] [address]  [facility] instead?

## 2023-06-30 ENCOUNTER — OFFICE VISIT (OUTPATIENT)
Dept: PRIMARY CARE CLINIC | Age: 43
End: 2023-06-30
Payer: COMMERCIAL

## 2023-06-30 VITALS
TEMPERATURE: 97.6 F | RESPIRATION RATE: 13 BRPM | WEIGHT: 215 LBS | HEART RATE: 65 BPM | BODY MASS INDEX: 35.78 KG/M2 | SYSTOLIC BLOOD PRESSURE: 120 MMHG | DIASTOLIC BLOOD PRESSURE: 86 MMHG | OXYGEN SATURATION: 98 %

## 2023-06-30 DIAGNOSIS — K42.9 UMBILICAL HERNIA WITHOUT OBSTRUCTION AND WITHOUT GANGRENE: Primary | ICD-10-CM

## 2023-06-30 PROCEDURE — 99213 OFFICE O/P EST LOW 20 MIN: CPT | Performed by: INTERNAL MEDICINE

## 2023-06-30 SDOH — ECONOMIC STABILITY: FOOD INSECURITY: WITHIN THE PAST 12 MONTHS, YOU WORRIED THAT YOUR FOOD WOULD RUN OUT BEFORE YOU GOT MONEY TO BUY MORE.: NEVER TRUE

## 2023-06-30 SDOH — ECONOMIC STABILITY: INCOME INSECURITY: HOW HARD IS IT FOR YOU TO PAY FOR THE VERY BASICS LIKE FOOD, HOUSING, MEDICAL CARE, AND HEATING?: NOT HARD AT ALL

## 2023-06-30 SDOH — ECONOMIC STABILITY: FOOD INSECURITY: WITHIN THE PAST 12 MONTHS, THE FOOD YOU BOUGHT JUST DIDN'T LAST AND YOU DIDN'T HAVE MONEY TO GET MORE.: NEVER TRUE

## 2023-06-30 ASSESSMENT — PATIENT HEALTH QUESTIONNAIRE - PHQ9
SUM OF ALL RESPONSES TO PHQ QUESTIONS 1-9: 0
SUM OF ALL RESPONSES TO PHQ9 QUESTIONS 1 & 2: 0
SUM OF ALL RESPONSES TO PHQ QUESTIONS 1-9: 0
2. FEELING DOWN, DEPRESSED OR HOPELESS: 0
SUM OF ALL RESPONSES TO PHQ QUESTIONS 1-9: 0
SUM OF ALL RESPONSES TO PHQ QUESTIONS 1-9: 0
1. LITTLE INTEREST OR PLEASURE IN DOING THINGS: 0

## 2023-07-03 DIAGNOSIS — N46.9 INFERTILITY MALE: ICD-10-CM

## 2023-07-03 DIAGNOSIS — R79.89 LOW TESTOSTERONE: ICD-10-CM

## 2023-07-03 NOTE — TELEPHONE ENCOUNTER
Requested Refill:   Requested Prescriptions     Pending Prescriptions Disp Refills    clomiPHENE (CLOMID) 50 MG tablet [Pharmacy Med Name: CLOMID  50MG  TAB] 36 tablet      Sig: TAKE 1 TABLET BY MOUTH THREE  TIMES A WEEK ([address] [place] [address] Dr, WEDNESDAY, AND FRIDAY)       Last refilled: 2/22/2023 # 45 tablets with 1 refill     Last Appt: 2/22/2023  Next Appt: 8/23/2023

## 2023-07-10 ENCOUNTER — OFFICE VISIT (OUTPATIENT)
Dept: SURGERY | Age: 43
End: 2023-07-10
Payer: COMMERCIAL

## 2023-07-10 VITALS — BODY MASS INDEX: 35.99 KG/M2 | HEIGHT: 65 IN | WEIGHT: 216 LBS

## 2023-07-10 DIAGNOSIS — K42.9 UMBILICAL HERNIA WITHOUT OBSTRUCTION AND WITHOUT GANGRENE: Primary | ICD-10-CM

## 2023-07-10 PROCEDURE — 99204 OFFICE O/P NEW MOD 45 MIN: CPT | Performed by: SURGERY

## 2023-07-13 ENCOUNTER — TELEPHONE (OUTPATIENT)
Dept: SURGERY | Age: 43
End: 2023-07-13

## 2023-07-14 NOTE — PROGRESS NOTES
PATIENT NAME: Hayde Schneider     YOB: 1980     TODAY'S DATE: 7/14/2023    Reason for Visit:  Umbilical hernia    Requesting Physician:  Dr. Michael Alejandre:              The patient is a 44 y.o. male with a PMHx as delineated below who presents with a bulge at the umbilicus that has been noted for some time. This has increased in size and more recently has been associated with discomfort with activities. No obstructive complaints.      Chief Complaint   Patient presents with    New Patient     Umbilical hernia        REVIEW OF SYSTEMS:  CONSTITUTIONAL:  negative  HEENT:  negative  RESPIRATORY:  negative  CARDIOVASCULAR:  negative  GASTROINTESTINAL:  negative except for abdominal pain  GENITOURINARY:  negative  HEMATOLOGIC/LYMPHATIC:  negative  MUSCULOSKELETAL: negative  NEUROLOGICAL:  negative    PMH  Past Medical History:   Diagnosis Date    Eczema     Mixed hyperlipidemia 12/9/2011    Seasonal allergic rhinitis     ait in past    Traumatic incomplete tear of right rotator cuff 5/1/2020       PSH  Past Surgical History:   Procedure Laterality Date    DENTAL SURGERY      wisdom teeth    HAND SURGERY  2008    bb removed left hand    LASIK  2008    ou    SHOULDER SURGERY Right 5/6/2020    RIGHT SHOULDER SCOPE/ ROTATOR CUFF REPAIR/ SUBPECTORIAL BICEPS TENODESIS/ SUBACROMIAL DECOMPRESSION performed by Mariella Roy MD at [address] [facility]  02/2017       Social History  Social History     Socioeconomic History    Marital status:      Spouse name: Not on file    Number of children: Not on file    Years of education: Not on file    Highest education level: Not on file   Occupational History    Not on file   Tobacco Use    Smoking status: Never    Smokeless tobacco: Former   Vaping Use    Vaping Use: Not on file   Substance and Sexual Activity    Alcohol use: Yes     Comment: occasional - 2-3 drinks weekly    Drug use: No    Sexual activity: Yes     Partners: Female

## 2023-07-17 RX ORDER — HYDROXYZINE HYDROCHLORIDE 25 MG/1
1 TABLET, FILM COATED ORAL PRN
COMMUNITY

## 2023-07-17 RX ORDER — CRISABOROLE 20 MG/G
OINTMENT TOPICAL
COMMUNITY

## 2023-07-17 NOTE — PROGRESS NOTES
Place patient label inside box (if no patient label, complete below)  Name:  :        MR#:   Eugenia Mcclelland / [id number]  I (we), Sheila Herrera (Patient Name) authorize Dwaine Godwin MD (Provider / Tariq Wade) and/or such assistants as may be selected by him/her, to perform the following operation/procedure(s): OPEN UMBILICAL HERNIA REPAIR       Note: If unable to obtain consent prior to an emergent procedure, document the emergent reason in the medical record. This procedure has been explained to my (our) satisfaction and included in the explanation was: The intended benefit, nature, and extent of the procedure to be performed; The significant risks involved and the probability of success; Alternative procedures and methods of treatment; The dangers and probable consequences of such alternatives (including no procedure or treatment); The expected consequences of the procedure on my future health; Whether other qualified individuals would be performing important surgical tasks and/or whether  would be present to advise or support the procedure. I (we) understand that there are other risks of infection and other serious complications in the pre-operative/procedural and postoperative/procedural stages of my (our) care. I (we) have asked all of the questions which I (we) thought were important in deciding whether or not to undergo treatment or diagnosis. These questions have been answered to my (our) satisfaction. I (we) understand that no assurance can be given that the procedure will be a success, and no guarantee or warranty of success has been given to me (us). It has been explained to me (us) that during the course of the operation/procedure, unforeseen conditions may be revealed that necessitate extension of the original procedure(s) or different procedure(s) than those set forth in Paragraph 1.  I (we) authorize and request ______________________________________________________________________________________________    If a phone consent is obtained, consent will be documented by using two health care professionals, each affirming that the consenting party has no questions and gives consent for the procedure discussed with the physician/provider.   _____________________          ____________________       _____/_____am/pm   2nd witness to phone consent        Printed name           Date / Time    Informed Consent:  I have provided the explanation described above in section 1 to the patient and/or legal representative.  I have provided the patient and/or legal representative with an opportunity to ask any questions about the proposed operation/procedure.   ___________________________          ____________________         ____/____am/pm  Provider / Proceduralist                            Printed name            Date / Time  Revised 8/2/2021                                                                      Page 2 of 2

## 2023-07-17 NOTE — TELEPHONE ENCOUNTER
Patient seen 7/10/23 surgery letter received Open umbilical hernia repair 1 hr OR time needed under MAC    Covid vaccinated     Pre op instructions reviewed including the need for a H&P. Patient seen on 6/30. Pre op packet emailed to [email or web address]     Post op appt scheduled    Faxed to scheduling    [facility] [place]

## 2023-07-17 NOTE — PROGRESS NOTES
[facility] PRE-SURGICAL TESTING INSTRUCTIONS                      PRIOR TO PROCEDURE DATE:    1. PLEASE FOLLOW ANY INSTRUCTIONS GIVEN TO YOU PER YOUR SURGEON. 2. Arrange for someone to drive you home and be with you for the first 24 hours after discharge for your safety after your procedure for which you received sedation. Ensure it is someone we can share information with regarding your discharge. NOTE: At this time ONLY 2 ADULTS may accompany you   One person ENCOURAGED to stay at hospital entire time if outpatient surgery      3. You must contact your surgeon for instructions IF:  You are taking any blood thinners, aspirin, anti-inflammatory or vitamins. There is a change in your physical condition such as a cold, fever, rash, cuts, sores, or any other infection, especially near your surgical site. 4. Do not drink alcohol the day before or day of your procedure. Do not use any recreational marijuana at least 24 hours or street drugs (heroin, cocaine) at minimum 5 days prior to your procedure. 5. A Pre-Surgical History and Physical MUST be completed WITHIN 30 DAYS OR LESS prior to your procedure. by your Physician or an Urgent Care        THE DAY OF YOUR PROCEDURE:  1. Follow instructions for ARRIVAL TIME as DIRECTED BY YOUR SURGEON. 2. Enter the MAIN entrance from [address] and follow the signs to the free Parking Agency Spot[employer] or [employer] (offered free of charge 7 am-5pm). 3. Enter the Main Entrance of the hospital (do not enter from the lower level of the parking garage). Upon entrance, check in with the  at the surgical information desk on your LEFT. Bring your insurance card and photo ID to register      4. DO NOT EAT ANYTHING 8 hours prior to arrival for surgery. You may have up to 8 ounces of water 4 hours prior to your arrival for surgery.    NOTE: ALL Gastric, Bariatric & Bowel surgery patients - you MUST follow your surgeon's instructions regarding extreme drowsiness, changes in your vital signs or breathing patterns. Nausea, headache, muscle aches, or sore throat may also occur after anesthesia. Your nurse will help you manage these potential side effects. 2. For comfort and safety, arrange to have someone at home with you for the first 24 hours after discharge. 3. You and your family will be given written instructions about your diet, activity, dressing care, medications, and return visits. 4. Once at home, should issues with nausea, pain, or bleeding occur, or should you notice any signs of infection, you should call your surgeon. 5. Always clean your hands before and after caring for your wound. Do not let your family touch your surgery site without cleaning their hands. 6. Narcotic pain medications can cause significant constipation. You may want to add a stool softener to your postoperative medication schedule or speak to your surgeon on how best to manage this SIDE EFFECT. SPECIAL INSTRUCTIONS PATIENT ACKNOWLEDGES UNDERSTANDING OF PRE OP INSTRUCTIONS. Thank you for allowing us to care for you. We strive to exceed your expectations in the delivery of care and service provided to you and your family. If you need to contact the [facility] staff for any reason, please call us at [phone]    Instructions reviewed with patient during preadmission testing phone interview.   Gina Gilbert RN.7/17/2023 .11:14 AM      ADDITIONAL EDUCATIONAL INFORMATION REVIEWED PER PHONE WITH YOU AND/OR YOUR FAMILY:    Yes Antibacterial Soap DIAL OR SAFEGUARD WITH BATHING

## 2023-07-20 ENCOUNTER — ANESTHESIA (OUTPATIENT)
Dept: OPERATING ROOM | Age: 43
End: 2023-07-20
Payer: COMMERCIAL

## 2023-07-20 ENCOUNTER — ANESTHESIA EVENT (OUTPATIENT)
Dept: OPERATING ROOM | Age: 43
End: 2023-07-20
Payer: COMMERCIAL

## 2023-07-20 ENCOUNTER — HOSPITAL ENCOUNTER (OUTPATIENT)
Age: 43
Setting detail: OUTPATIENT SURGERY
Discharge: HOME | End: 2023-07-20
Attending: SURGERY | Admitting: SURGERY
Payer: COMMERCIAL

## 2023-07-20 VITALS
TEMPERATURE: 97.6 F | HEART RATE: 63 BPM | OXYGEN SATURATION: 96 % | WEIGHT: 212.4 LBS | RESPIRATION RATE: 18 BRPM | HEIGHT: 65 IN | DIASTOLIC BLOOD PRESSURE: 97 MMHG | SYSTOLIC BLOOD PRESSURE: 136 MMHG | BODY MASS INDEX: 35.39 KG/M2

## 2023-07-20 DIAGNOSIS — K42.9 UMBILICAL HERNIA WITHOUT OBSTRUCTION AND WITHOUT GANGRENE: ICD-10-CM

## 2023-07-20 DIAGNOSIS — G89.18 POST-OP PAIN: Primary | ICD-10-CM

## 2023-07-20 PROCEDURE — 2500000003 HC RX 250 WO HCPCS

## 2023-07-20 PROCEDURE — C1781 MESH (IMPLANTABLE): HCPCS | Performed by: SURGERY

## 2023-07-20 PROCEDURE — 3600000004 HC SURGERY LEVEL 4 BASE: Performed by: SURGERY

## 2023-07-20 PROCEDURE — 49592 RPR AA HRN 1ST < 3 NCR/STRN: CPT | Performed by: SURGERY

## 2023-07-20 PROCEDURE — 2580000003 HC RX 258: Performed by: FAMILY MEDICINE

## 2023-07-20 PROCEDURE — 3700000000 HC ANESTHESIA ATTENDED CARE: Performed by: SURGERY

## 2023-07-20 PROCEDURE — 2580000003 HC RX 258

## 2023-07-20 PROCEDURE — 6370000000 HC RX 637 (ALT 250 FOR IP): Performed by: ANESTHESIOLOGY

## 2023-07-20 PROCEDURE — 6360000002 HC RX W HCPCS: Performed by: ANESTHESIOLOGY

## 2023-07-20 PROCEDURE — 2580000003 HC RX 258: Performed by: SURGERY

## 2023-07-20 PROCEDURE — 7100000011 HC PHASE II RECOVERY - ADDTL 15 MIN: Performed by: SURGERY

## 2023-07-20 PROCEDURE — 7100000001 HC PACU RECOVERY - ADDTL 15 MIN: Performed by: SURGERY

## 2023-07-20 PROCEDURE — 3600000014 HC SURGERY LEVEL 4 ADDTL 15MIN: Performed by: SURGERY

## 2023-07-20 PROCEDURE — 7100000000 HC PACU RECOVERY - FIRST 15 MIN: Performed by: SURGERY

## 2023-07-20 PROCEDURE — 88302 TISSUE EXAM BY PATHOLOGIST: CPT

## 2023-07-20 PROCEDURE — 6360000002 HC RX W HCPCS

## 2023-07-20 PROCEDURE — 7100000010 HC PHASE II RECOVERY - FIRST 15 MIN: Performed by: SURGERY

## 2023-07-20 PROCEDURE — 3700000001 HC ADD 15 MINUTES (ANESTHESIA): Performed by: SURGERY

## 2023-07-20 PROCEDURE — 6360000002 HC RX W HCPCS: Performed by: SURGERY

## 2023-07-20 PROCEDURE — A4217 STERILE WATER/SALINE, 500 ML: HCPCS | Performed by: SURGERY

## 2023-07-20 PROCEDURE — 2709999900 HC NON-CHARGEABLE SUPPLY: Performed by: SURGERY

## 2023-07-20 DEVICE — IMPLANTABLE DEVICE: Type: IMPLANTABLE DEVICE | Status: FUNCTIONAL

## 2023-07-20 RX ORDER — SODIUM CHLORIDE, SODIUM LACTATE, POTASSIUM CHLORIDE, CALCIUM CHLORIDE 600; 310; 30; 20 MG/100ML; MG/100ML; MG/100ML; MG/100ML
INJECTION, SOLUTION INTRAVENOUS CONTINUOUS PRN
Status: DISCONTINUED | OUTPATIENT
Start: 2023-07-20 | End: 2023-07-20 | Stop reason: SDUPTHER

## 2023-07-20 RX ORDER — OXYCODONE HYDROCHLORIDE 5 MG/1
5 TABLET ORAL EVERY 6 HOURS PRN
Qty: 12 TABLET | Refills: 0 | Status: SHIPPED | OUTPATIENT
Start: 2023-07-20 | End: 2023-07-23

## 2023-07-20 RX ORDER — MIDAZOLAM HYDROCHLORIDE 1 MG/ML
INJECTION INTRAMUSCULAR; INTRAVENOUS PRN
Status: DISCONTINUED | OUTPATIENT
Start: 2023-07-20 | End: 2023-07-20 | Stop reason: SDUPTHER

## 2023-07-20 RX ORDER — SODIUM CHLORIDE 9 MG/ML
INJECTION, SOLUTION INTRAVENOUS PRN
Status: DISCONTINUED | OUTPATIENT
Start: 2023-07-20 | End: 2023-07-20 | Stop reason: HOSPADM

## 2023-07-20 RX ORDER — BUPIVACAINE HYDROCHLORIDE 5 MG/ML
INJECTION, SOLUTION EPIDURAL; INTRACAUDAL PRN
Status: DISCONTINUED | OUTPATIENT
Start: 2023-07-20 | End: 2023-07-20 | Stop reason: HOSPADM

## 2023-07-20 RX ORDER — SODIUM CHLORIDE, SODIUM LACTATE, POTASSIUM CHLORIDE, CALCIUM CHLORIDE 600; 310; 30; 20 MG/100ML; MG/100ML; MG/100ML; MG/100ML
INJECTION, SOLUTION INTRAVENOUS CONTINUOUS
Status: DISCONTINUED | OUTPATIENT
Start: 2023-07-20 | End: 2023-07-20 | Stop reason: HOSPADM

## 2023-07-20 RX ORDER — ONDANSETRON 2 MG/ML
INJECTION INTRAMUSCULAR; INTRAVENOUS PRN
Status: DISCONTINUED | OUTPATIENT
Start: 2023-07-20 | End: 2023-07-20 | Stop reason: SDUPTHER

## 2023-07-20 RX ORDER — MAGNESIUM HYDROXIDE 1200 MG/15ML
LIQUID ORAL CONTINUOUS PRN
Status: DISCONTINUED | OUTPATIENT
Start: 2023-07-20 | End: 2023-07-20 | Stop reason: HOSPADM

## 2023-07-20 RX ORDER — SODIUM CHLORIDE 0.9 % (FLUSH) 0.9 %
5-40 SYRINGE (ML) INJECTION EVERY 12 HOURS SCHEDULED
Status: DISCONTINUED | OUTPATIENT
Start: 2023-07-20 | End: 2023-07-20 | Stop reason: HOSPADM

## 2023-07-20 RX ORDER — SODIUM CHLORIDE 0.9 % (FLUSH) 0.9 %
5-40 SYRINGE (ML) INJECTION PRN
Status: DISCONTINUED | OUTPATIENT
Start: 2023-07-20 | End: 2023-07-20 | Stop reason: HOSPADM

## 2023-07-20 RX ORDER — DEXAMETHASONE SODIUM PHOSPHATE 4 MG/ML
INJECTION, SOLUTION INTRA-ARTICULAR; INTRALESIONAL; INTRAMUSCULAR; INTRAVENOUS; SOFT TISSUE PRN
Status: DISCONTINUED | OUTPATIENT
Start: 2023-07-20 | End: 2023-07-20 | Stop reason: SDUPTHER

## 2023-07-20 RX ORDER — FENTANYL CITRATE 50 UG/ML
INJECTION, SOLUTION INTRAMUSCULAR; INTRAVENOUS PRN
Status: DISCONTINUED | OUTPATIENT
Start: 2023-07-20 | End: 2023-07-20 | Stop reason: SDUPTHER

## 2023-07-20 RX ORDER — GLYCOPYRROLATE 0.2 MG/ML
INJECTION INTRAMUSCULAR; INTRAVENOUS PRN
Status: DISCONTINUED | OUTPATIENT
Start: 2023-07-20 | End: 2023-07-20 | Stop reason: SDUPTHER

## 2023-07-20 RX ORDER — OXYCODONE HYDROCHLORIDE AND ACETAMINOPHEN 5; 325 MG/1; MG/1
1 TABLET ORAL
Status: COMPLETED | OUTPATIENT
Start: 2023-07-20 | End: 2023-07-20

## 2023-07-20 RX ORDER — PROPOFOL 10 MG/ML
INJECTION, EMULSION INTRAVENOUS PRN
Status: DISCONTINUED | OUTPATIENT
Start: 2023-07-20 | End: 2023-07-20 | Stop reason: SDUPTHER

## 2023-07-20 RX ORDER — ONDANSETRON 2 MG/ML
4 INJECTION INTRAMUSCULAR; INTRAVENOUS
Status: COMPLETED | OUTPATIENT
Start: 2023-07-20 | End: 2023-07-20

## 2023-07-20 RX ORDER — KETAMINE HCL IN NACL, ISO-OSM 20 MG/2 ML
SYRINGE (ML) INJECTION PRN
Status: DISCONTINUED | OUTPATIENT
Start: 2023-07-20 | End: 2023-07-20 | Stop reason: SDUPTHER

## 2023-07-20 RX ORDER — PROPOFOL 10 MG/ML
INJECTION, EMULSION INTRAVENOUS CONTINUOUS PRN
Status: DISCONTINUED | OUTPATIENT
Start: 2023-07-20 | End: 2023-07-20 | Stop reason: SDUPTHER

## 2023-07-20 RX ADMIN — Medication 20 MG: at 12:19

## 2023-07-20 RX ADMIN — ONDANSETRON 4 MG: 2 INJECTION INTRAMUSCULAR; INTRAVENOUS at 12:26

## 2023-07-20 RX ADMIN — DEXAMETHASONE SODIUM PHOSPHATE 4 MG: 4 INJECTION, SOLUTION INTRAMUSCULAR; INTRAVENOUS at 12:26

## 2023-07-20 RX ADMIN — SODIUM CHLORIDE, POTASSIUM CHLORIDE, SODIUM LACTATE AND CALCIUM CHLORIDE: 600; 310; 30; 20 INJECTION, SOLUTION INTRAVENOUS at 10:52

## 2023-07-20 RX ADMIN — GLYCOPYRROLATE 0.2 MG: 0.2 INJECTION INTRAMUSCULAR; INTRAVENOUS at 12:26

## 2023-07-20 RX ADMIN — OXYCODONE AND ACETAMINOPHEN 1 TABLET: 5; 325 TABLET ORAL at 13:43

## 2023-07-20 RX ADMIN — PROPOFOL 50 MG: 10 INJECTION, EMULSION INTRAVENOUS at 12:45

## 2023-07-20 RX ADMIN — MIDAZOLAM HYDROCHLORIDE 2 MG: 2 INJECTION, SOLUTION INTRAMUSCULAR; INTRAVENOUS at 12:11

## 2023-07-20 RX ADMIN — FENTANYL CITRATE 75 MCG: 50 INJECTION, SOLUTION INTRAMUSCULAR; INTRAVENOUS at 12:31

## 2023-07-20 RX ADMIN — Medication 20 MG: at 12:27

## 2023-07-20 RX ADMIN — PROPOFOL 120 MCG/KG/MIN: 10 INJECTION, EMULSION INTRAVENOUS at 12:19

## 2023-07-20 RX ADMIN — HYDROMORPHONE HYDROCHLORIDE 0.5 MG: 1 INJECTION, SOLUTION INTRAMUSCULAR; INTRAVENOUS; SUBCUTANEOUS at 13:27

## 2023-07-20 RX ADMIN — SODIUM CHLORIDE, SODIUM LACTATE, POTASSIUM CHLORIDE, AND CALCIUM CHLORIDE: .6; .31; .03; .02 INJECTION, SOLUTION INTRAVENOUS at 12:11

## 2023-07-20 RX ADMIN — FENTANYL CITRATE 25 MCG: 50 INJECTION, SOLUTION INTRAMUSCULAR; INTRAVENOUS at 12:26

## 2023-07-20 RX ADMIN — ONDANSETRON 4 MG: 2 INJECTION INTRAMUSCULAR; INTRAVENOUS at 13:44

## 2023-07-20 RX ADMIN — PROPOFOL 80 MG: 10 INJECTION, EMULSION INTRAVENOUS at 12:19

## 2023-07-20 ASSESSMENT — PAIN - FUNCTIONAL ASSESSMENT
PAIN_FUNCTIONAL_ASSESSMENT: 0-10
PAIN_FUNCTIONAL_ASSESSMENT: PREVENTS OR INTERFERES SOME ACTIVE ACTIVITIES AND ADLS

## 2023-07-20 ASSESSMENT — PAIN SCALES - GENERAL
PAINLEVEL_OUTOF10: 7
PAINLEVEL_OUTOF10: 5
PAINLEVEL_OUTOF10: 7
PAINLEVEL_OUTOF10: 5
PAINLEVEL_OUTOF10: 0

## 2023-07-20 ASSESSMENT — PAIN DESCRIPTION - FREQUENCY: FREQUENCY: CONTINUOUS

## 2023-07-20 ASSESSMENT — PAIN DESCRIPTION - LOCATION: LOCATION: ABDOMEN;UMBILICUS

## 2023-07-20 ASSESSMENT — PAIN DESCRIPTION - DESCRIPTORS: DESCRIPTORS: ACHING;DISCOMFORT

## 2023-07-20 ASSESSMENT — PAIN DESCRIPTION - ONSET: ONSET: ON-GOING

## 2023-07-20 ASSESSMENT — PAIN DESCRIPTION - ORIENTATION: ORIENTATION: LOWER

## 2023-07-20 ASSESSMENT — PAIN DESCRIPTION - PAIN TYPE: TYPE: SURGICAL PAIN

## 2023-07-20 NOTE — ANESTHESIA POSTPROCEDURE EVALUATION
Department of Anesthesiology  Postprocedure Note    Patient: Bebe Saldana  MRN: [id number]  YOB: 1980  Date of evaluation: 7/20/2023      Procedure Summary     Date: 07/20/23 Room / Location: [address] [place] [address]    Anesthesia Start: 0145 Anesthesia Stop: 1648    Procedure: OPEN UMBILICAL HERNIA REPAIR Diagnosis:       Umbilical hernia without obstruction and without gangrene      (Umbilical hernia without obstruction and without gangrene [K42.9])    Surgeons: Gloria Hogue MD Responsible Provider: Marques Conner MD    Anesthesia Type: MAC ASA Status: 2          Anesthesia Type: MAC    Jonas Phase I: Jonas Score: 10    Jonas Phase II:        Anesthesia Post Evaluation    Patient location during evaluation: PACU  Patient participation: complete - patient participated  Level of consciousness: awake and alert  Airway patency: patent  Nausea & Vomiting: no nausea and no vomiting  Complications: no  Cardiovascular status: hemodynamically stable  Respiratory status: acceptable  Hydration status: euvolemic  Multimodal analgesia pain management approach

## 2023-07-20 NOTE — BRIEF OP NOTE
Brief Postoperative Note      Patient: Jason Vazquez  YOB: 1980  MRN: [id number]    Date of Procedure: 7/20/2023    Pre-Op Diagnosis Codes:     * Umbilical hernia without obstruction and without gangrene [K42.9]    Post-Op Diagnosis: Same       Procedure(s):  OPEN UMBILICAL HERNIA REPAIR    Surgeon(s):  Jason Childs MD    Assistant:  Resident: Kaitlyn Myers DO; Vu Carrasco DO    Anesthesia: Monitor Anesthesia Care    Estimated Blood Loss (mL): Minimal    Complications: None    Specimens:   ID Type Source Tests Collected by Time Destination   A : A) [place], MD 7/20/2023 1248        Implants:  * No implants in log *      Drains: * No LDAs found *    Findings:   Open 2 cm umbilical hernia repair with mesh.       Electronically signed by Kaitlyn Myers DO on 7/20/2023 at 1:19 PM

## 2023-07-20 NOTE — H&P
Update History & Physical    The patient's History and Physical of July 10, was reviewed with the patient and I examined the patient. There was no change. The surgical site was confirmed by the patient and me. Plan: The risks, benefits, expected outcome, and alternative to the recommended procedure have been discussed with the patient. Patient understands and wants to proceed with the procedure.      Electronically signed by Dorothy Amador DO on 7/20/2023 at 12:14 PM

## 2023-07-20 NOTE — PROGRESS NOTES
Patient is resting in bed with call light.   Antibiotic on hold to OR: ancef on hold to OR  Belongings: 1 bag to wife  Labs sent: none ordered  Patient specific details: none

## 2023-07-20 NOTE — DISCHARGE INSTRUCTIONS
fluid today. Notify your physician if you have not urinated within 8 hours after your procedure or you feel uncomfortable. Irritated Throat from a Breathing Tube  [x]Drink extra amounts of fluid today. Lozenges may help. Muscle Aches  [x]You may experience some generalized body aches as your muscles recover from medications used to relax them during surgery. These will gradually subside. MEDICATION INSTRUCTIONS:  []Prescription(S) x     sent with you. Use as directed. When taking pain medications, you may experience the side effect of dizziness or drowsiness. Do not drink alcohol or drive when taking these medications. []Prescription(S) x          Called to Pharmacy Name and location:    [x]Give the list of your medications to your primary care physician on your next visit. Keep your med list updated and carry it with in case of emergencies. [] Narcotic pain medications can cause the side effect of significant constipation. You may want to add a stool softener to your postoperative medication schedule or speak to your surgeon on how best to manage this side effect. NARCOTIC SAFETY:  Your pain medicine is only for you to take. Safely store your medicines. Store pills up high and out of reach of children and pets. Ensure safety caps are snapped tightly  Keep track of how many pills you have left    Unused medication can be disposed of by taking them to a drop-off box or take-back program that is authorized by the [facility]. Access to a site near you can be found on the [facility] website ([address] usdoj.gov). If you have a CPAP machine, it is very important that you use it daily during all periods of sleep and daytime rest during your recovery at home. Surgery and Anesthesia place a significant amount of stress on your body. Using your CPAP will help keep you safe and lessen the negative effects of that stress.     FOLLOW-UP RECOVERY CARE:  [x]Call the office at ***

## 2023-07-20 NOTE — FLOWSHEET NOTE
PACU Transfer to [facility]    Procedure(s):  OPEN UMBILICAL HERNIA REPAIR    Pt meets criteria to transfer to next phase of care per ERIAK SCORE and ASPAN standards      Vitals:    07/20/23 1405   BP:    Pulse:    Resp:    Temp:    SpO2: 93%            Intake/Output Summary (Last 24 hours) at 7/20/2023 1414  Last data filed at 7/20/2023 1405  Gross per 24 hour   Intake 1940 ml   Output --   Net 1940 ml       Pain assessment:  present - adequately treated  Pain Level: 5    Writer transported pt to [facility]5 via stretcher. Belongings picked up from locker and placed in room.

## 2023-07-22 NOTE — OP NOTE
Operative Note      Patient: Unique Maloney  YOB: 1980  MRN: [id number]    Date of Procedure: 7/20/2023    Pre-Op Diagnosis Codes:     * Umbilical hernia without obstruction and without gangrene [K42.9]    Post-Op Diagnosis: Same       Procedure(s):  OPEN UMBILICAL HERNIA REPAIR    Surgeon(s):  Michael Vora MD    Assistant:   Resident: Erwin Menezes DO; Che Guerra DO    Anesthesia: Monitor Anesthesia Care    Estimated Blood Loss (mL): Minimal    Complications: None    Specimens:   ID Type Source Tests Collected by Time Destination   A : A) [place], MD 7/20/2023 1248        Implants:  Implant Name Type Inv. Item Serial No.  Lot No. LRB No. Used Action   MESH BERTHA SM DIA4. 3CM VENTRAL POLYPR EPTFE CIR SELF EXP - [id number]  MESH BERTHA SM DIA4. 3CM VENTRAL POLYPR EPTFE CIR SELF EXP  BARD DAVOL-WD [id number] N/A 1 Implanted         Drains: * No LDAs found *    Findings: 2cm umbilical hernia repaired with mesh    Detailed Description of Procedure: Indication for Procedure: The patient is a 44year old male who presented to the outpatient office with a bulge noted at the umbilicus. He has noted an increase in size and increase in pain with activities. Due to symptomatic nature of umbilical hernia surgical management was recommended. Risks, benefits, and alternatives were discussed with the patient. The patient was taken to the operating room and placed in supine position. The patient was prepped and draped in sterile fashion. Preoperative antibiotics were administered. A time out was called. Incision was made in the infraumbilical region after an injection of local anesthetic with a number 15 blade scalpel. This was carried down to the fascia with electrocautery. The hernia sac was dissected bluntly with a finger and Modesta clamp.  Once the umbilical hernia was encircled we proceeded to dissect the umbilicus off of the hernia sac

## 2023-08-11 ENCOUNTER — OFFICE VISIT (OUTPATIENT)
Dept: SURGERY | Age: 43
End: 2023-08-11

## 2023-08-11 DIAGNOSIS — Z09 S/P UMBILICAL HERNIA REPAIR, FOLLOW-UP EXAM: Primary | ICD-10-CM

## 2023-08-11 PROCEDURE — 99024 POSTOP FOLLOW-UP VISIT: CPT | Performed by: SURGERY

## 2023-08-11 NOTE — PROGRESS NOTES
Department of General Surgery - Adult  General Surgery Service  Resident Office Note      CHIEF COMPLAINT:  umbilical hernia    HISTORY OF PRESENT ILLNESS:  This is a 44 y.o. male with Hx of umbilical hernia status post open umbilical hernia repair with mesh (07/20). Patient reports he is doing really well. Pain is very minimal at this time. Tolerating diet without nausea/vomiting. Back to his normal bowel/bladder function. REVIEW OF SYSTEMS:    A 10 point review of systems was conducted, significant findings as noted in HPI. All other systems negative. PHYSICAL EXAM:    There were no vitals filed for this visit. General appearance: Alert, resting comfortably  Neuro: A&Ox3, no focal deficits  HENT: Trachea midline, no JVD, no LAD  Eyes: PERRLA, no scleral icterus  Chest/Lungs: Symmetrical chest rise, no increased work of breathing  Cardiovascular: Well-perfused  Abdomen: Soft, non-tender, non-distended, no guarding/rigidity. Incisions C/D/I  Skin: Warm and dry, no rashes  Extremities: No edema, no cyanosis    ASSESSMENT AND PLAN:  The patient is s/p open umbilical hernia repair with mesh. The patient is recovering well from surgery and I have no concerns at this time. We discussed his continued restrictions and I will see him back in the office on a PRN basis. [address] DO, PGY-2  08/11/23  11:32 AM    I have seen, examined, and reviewed the patients chart. I agree with the residents assessment and have made appropriate changes.     Angélica Armstrong

## 2023-08-21 DIAGNOSIS — R79.89 HIGH SERUM THYROID STIMULATING HORMONE (TSH): ICD-10-CM

## 2023-08-21 DIAGNOSIS — R79.89 LOW TESTOSTERONE: ICD-10-CM

## 2023-08-21 DIAGNOSIS — N46.9 INFERTILITY MALE: ICD-10-CM

## 2023-08-21 LAB
ALBUMIN SERPL-MCNC: 4.4 G/DL (ref 3.4–5)
ALBUMIN/GLOB SERPL: 2.2 {RATIO} (ref 1.1–2.2)
ALP SERPL-CCNC: 76 U/L (ref 40–129)
ALT SERPL-CCNC: 15 U/L (ref 10–40)
ANION GAP SERPL CALCULATED.3IONS-SCNC: 11 MMOL/L (ref 3–16)
AST SERPL-CCNC: 20 U/L (ref 15–37)
BASOPHILS # BLD: 0 K/UL (ref 0–0.2)
BASOPHILS NFR BLD: 0.4 %
BILIRUB SERPL-MCNC: 0.3 MG/DL (ref 0–1)
BUN SERPL-MCNC: 16 MG/DL (ref 7–20)
CALCIUM SERPL-MCNC: 9.1 MG/DL (ref 8.3–10.6)
CHLORIDE SERPL-SCNC: 106 MMOL/L (ref 99–110)
CO2 SERPL-SCNC: 24 MMOL/L (ref 21–32)
CREAT SERPL-MCNC: 1.1 MG/DL (ref 0.9–1.3)
DEPRECATED RDW RBC AUTO: 12.8 % (ref 12.4–15.4)
EOSINOPHIL # BLD: 0.1 K/UL (ref 0–0.6)
EOSINOPHIL NFR BLD: 1.7 %
GFR SERPLBLD CREATININE-BSD FMLA CKD-EPI: >60 ML/MIN/{1.73_M2}
GLUCOSE SERPL-MCNC: 94 MG/DL (ref 70–99)
HCT VFR BLD AUTO: 43.7 % (ref 40.5–52.5)
HGB BLD-MCNC: 14.7 G/DL (ref 13.5–17.5)
LYMPHOCYTES # BLD: 1.8 K/UL (ref 1–5.1)
LYMPHOCYTES NFR BLD: 31.7 %
MCH RBC QN AUTO: 30.2 PG (ref 26–34)
MCHC RBC AUTO-ENTMCNC: 33.6 G/DL (ref 31–36)
MCV RBC AUTO: 90 FL (ref 80–100)
MONOCYTES # BLD: 0.4 K/UL (ref 0–1.3)
MONOCYTES NFR BLD: 7.1 %
NEUTROPHILS # BLD: 3.3 K/UL (ref 1.7–7.7)
NEUTROPHILS NFR BLD: 59.1 %
PLATELET # BLD AUTO: 237 K/UL (ref 135–450)
PMV BLD AUTO: 8.9 FL (ref 5–10.5)
POTASSIUM SERPL-SCNC: 4.2 MMOL/L (ref 3.5–5.1)
PROT SERPL-MCNC: 6.4 G/DL (ref 6.4–8.2)
PSA SERPL DL<=0.01 NG/ML-MCNC: 0.9 NG/ML (ref 0–4)
RBC # BLD AUTO: 4.86 M/UL (ref 4.2–5.9)
SODIUM SERPL-SCNC: 141 MMOL/L (ref 136–145)
WBC # BLD AUTO: 5.6 K/UL (ref 4–11)

## 2023-08-23 ENCOUNTER — OFFICE VISIT (OUTPATIENT)
Dept: ENDOCRINOLOGY | Age: 43
End: 2023-08-23
Payer: COMMERCIAL

## 2023-08-23 VITALS
WEIGHT: 214 LBS | BODY MASS INDEX: 35.65 KG/M2 | HEART RATE: 70 BPM | SYSTOLIC BLOOD PRESSURE: 125 MMHG | DIASTOLIC BLOOD PRESSURE: 88 MMHG | HEIGHT: 65 IN | OXYGEN SATURATION: 96 %

## 2023-08-23 DIAGNOSIS — N46.9 INFERTILITY MALE: ICD-10-CM

## 2023-08-23 DIAGNOSIS — R79.89 HIGH SERUM THYROID STIMULATING HORMONE (TSH): ICD-10-CM

## 2023-08-23 DIAGNOSIS — R79.89 LOW TESTOSTERONE: Primary | ICD-10-CM

## 2023-08-23 LAB
SHBG SERPL-SCNC: 20 NMOL/L (ref 11–80)
TESTOST FREE SERPL-MCNC: 87.1 PG/ML (ref 47–244)
TESTOST SERPL-MCNC: 338 NG/DL (ref 220–1000)

## 2023-08-23 PROCEDURE — 99214 OFFICE O/P EST MOD 30 MIN: CPT | Performed by: INTERNAL MEDICINE

## 2023-08-23 NOTE — PROGRESS NOTES
Patient ID:   Bridgette Merchant is a 44 y.o. male    Chief Complaint:   Bridgette Merchant is seen for an evaluation of low Testosterone    Referred by Dr. René Charles MD   Subjective:   Bridgette Merchant had TT of 221, Free T of 57.5 - Aug 2022 (checked around 11:37am). It was checked for ED. Clomid 25 mg three days a week      Libido is stable 6-7/10, performance  6 /10  Has a partner   Energy are same   Morning erections- more frequently    Has difficulty in concentrating at work. He is a  ([employer]). Has PTSD. Sees psychologist. Sleep is better. Exercising helping sleep     Has some soreness of muscles. Off exercise due to hernia surgery in July 2023   Has stable depression or anxiety . Takes edibles THCs    FOV:     Denies nipple discharge or gynecomastia     Never had head traumas   Fathered two children. No plans to have more kids. S/p vasectomy.      History of drug abuse: denies   Die sit smoke  Drinks wine twice a week       The following portions of the patient's history were reviewed and updated as appropriate:        Family History   Problem Relation Age of Onset    High Blood Pressure Mother     High Cholesterol Mother     Heart Disease Father 48        ami    High Blood Pressure Father     High Cholesterol Father     High Cholesterol Brother     High Cholesterol Brother     Emphysema Maternal Grandmother     High Blood Pressure Maternal Grandmother     High Cholesterol Maternal Grandmother     High Blood Pressure Maternal Grandfather     High Cholesterol Maternal Grandfather     Heart Disease Paternal Grandmother         CHF    High Blood Pressure Paternal Grandmother     High Cholesterol Paternal Grandmother     Heart Failure Paternal Grandfather 80    Heart Disease Paternal Grandfather         CHF    High Blood Pressure Paternal Grandfather     High Cholesterol Paternal Grandfather          Social History     Socioeconomic History    Marital status:      Spouse name: Not

## 2023-08-30 ENCOUNTER — OFFICE VISIT (OUTPATIENT)
Dept: PRIMARY CARE CLINIC | Age: 43
End: 2023-08-30
Payer: COMMERCIAL

## 2023-08-30 VITALS
HEIGHT: 65 IN | HEART RATE: 74 BPM | DIASTOLIC BLOOD PRESSURE: 80 MMHG | OXYGEN SATURATION: 98 % | SYSTOLIC BLOOD PRESSURE: 120 MMHG | RESPIRATION RATE: 13 BRPM | TEMPERATURE: 97.6 F | WEIGHT: 210 LBS | BODY MASS INDEX: 34.99 KG/M2

## 2023-08-30 DIAGNOSIS — E78.2 MIXED HYPERLIPIDEMIA: Chronic | ICD-10-CM

## 2023-08-30 DIAGNOSIS — K58.0 IRRITABLE BOWEL SYNDROME WITH DIARRHEA: ICD-10-CM

## 2023-08-30 DIAGNOSIS — R79.89 LOW TESTOSTERONE: ICD-10-CM

## 2023-08-30 DIAGNOSIS — L20.84 INTRINSIC ATOPIC DERMATITIS: Chronic | ICD-10-CM

## 2023-08-30 DIAGNOSIS — Z00.00 PHYSICAL EXAM, ANNUAL: Primary | ICD-10-CM

## 2023-08-30 LAB
CHOLEST SERPL-MCNC: 223 MG/DL (ref 0–199)
HDLC SERPL-MCNC: 41 MG/DL (ref 40–60)
LDLC SERPL CALC-MCNC: 144 MG/DL
TRIGL SERPL-MCNC: 190 MG/DL (ref 0–150)
VLDLC SERPL CALC-MCNC: 38 MG/DL

## 2023-08-30 PROCEDURE — 99396 PREV VISIT EST AGE 40-64: CPT | Performed by: INTERNAL MEDICINE

## 2023-08-30 ASSESSMENT — PATIENT HEALTH QUESTIONNAIRE - PHQ9
SUM OF ALL RESPONSES TO PHQ QUESTIONS 1-9: 0
1. LITTLE INTEREST OR PLEASURE IN DOING THINGS: 0
SUM OF ALL RESPONSES TO PHQ QUESTIONS 1-9: 0
SUM OF ALL RESPONSES TO PHQ9 QUESTIONS 1 & 2: 0
2. FEELING DOWN, DEPRESSED OR HOPELESS: 0
SUM OF ALL RESPONSES TO PHQ QUESTIONS 1-9: 0
SUM OF ALL RESPONSES TO PHQ QUESTIONS 1-9: 0

## 2023-08-30 NOTE — PROGRESS NOTES
include no chest pain, focal sensory loss, focal weakness, leg pain, myalgias or shortness of breath. Advised patient to continue the current instructions or medications. Intrinsic atopic dermatitis  On dupixent and eucrssa,  Patient is compliant w medications, no side effects, effective, provides adequate symptom relief. No new symptoms or problems as noted by patient. The problem is stable, no changes noted by patient. Will consider monitoring labs and refill medications as appropriate. Patient counseled and will continue current plan. Irritable bowel syndrome  Has been stable,   On fiber supplements      Low testosterone  Seeing endo for this,. Plan :     Labs ordered, reviewed. Medications refilled. All Health maintenance needs reviewed and the needful ordered.        Prateek Menjivar MD  8/30/2023 11:12 AM

## 2023-08-30 NOTE — ASSESSMENT & PLAN NOTE
On dupixent and eucrssa,  Patient is compliant w medications, no side effects, effective, provides adequate symptom relief. No new symptoms or problems as noted by patient. The problem is stable, no changes noted by patient. Will consider monitoring labs and refill medications as appropriate. Patient counseled and will continue current plan.

## 2023-08-30 NOTE — ASSESSMENT & PLAN NOTE
This has been a long standing problem, takes fenofibrate       Monitors diet and tries to follow a low fat diet. Has  been reasonably  compliant w exercise. Lipids have been stable, The problem is controlled. Recent lipid tests were reviewed and are normal. Pertinent negatives include no chest pain, focal sensory loss, focal weakness, leg pain, myalgias or shortness of breath. Advised patient to continue the current instructions or medications.

## 2023-08-31 LAB
EST. AVERAGE GLUCOSE BLD GHB EST-MCNC: 105.4 MG/DL
HBA1C MFR BLD: 5.3 %

## 2023-09-12 ENCOUNTER — NURSE ONLY (OUTPATIENT)
Dept: PRIMARY CARE CLINIC | Age: 43
End: 2023-09-12
Payer: COMMERCIAL

## 2023-09-12 DIAGNOSIS — Z23 NEEDS FLU SHOT: Primary | ICD-10-CM

## 2023-09-12 PROCEDURE — 90471 IMMUNIZATION ADMIN: CPT | Performed by: INTERNAL MEDICINE

## 2023-09-12 PROCEDURE — 90674 CCIIV4 VAC NO PRSV 0.5 ML IM: CPT | Performed by: INTERNAL MEDICINE

## 2023-10-10 RX ORDER — FENOFIBRATE 160 MG/1
TABLET ORAL
Qty: 90 TABLET | Refills: 3 | Status: SHIPPED | OUTPATIENT
Start: 2023-10-10

## 2023-10-10 NOTE — TELEPHONE ENCOUNTER
Medication:   Requested Prescriptions     Pending Prescriptions Disp Refills    fenofibrate (TRIGLIDE) 160 MG tablet [Pharmacy Med Name: Fenofibrate 160 MG Oral Tablet] 90 tablet 3     Sig: TAKE 1 TABLET BY MOUTH ONCE DAILY     Last Filled:  09/29/2022    Last appt: 8/30/2023   Next appt: Visit date not found    Last Lipid:   Lab Results   Component Value Date/Time    CHOL 223 08/30/2023 11:39 AM    TRIG 190 08/30/2023 11:39 AM    HDL 41 08/30/2023 11:39 AM    LDLCALC 144 08/30/2023 11:39 AM

## 2024-01-11 DIAGNOSIS — R79.89 HIGH SERUM THYROID STIMULATING HORMONE (TSH): ICD-10-CM

## 2024-01-11 DIAGNOSIS — N46.9 INFERTILITY MALE: ICD-10-CM

## 2024-01-11 DIAGNOSIS — R79.89 LOW TESTOSTERONE: ICD-10-CM

## 2024-01-11 LAB
ALBUMIN SERPL-MCNC: 4.5 G/DL (ref 3.4–5)
ALBUMIN/GLOB SERPL: 2 {RATIO} (ref 1.1–2.2)
ALP SERPL-CCNC: 89 U/L (ref 40–129)
ALT SERPL-CCNC: 15 U/L (ref 10–40)
ANION GAP SERPL CALCULATED.3IONS-SCNC: 12 MMOL/L (ref 3–16)
AST SERPL-CCNC: 17 U/L (ref 15–37)
BASOPHILS # BLD: 0 K/UL (ref 0–0.2)
BASOPHILS NFR BLD: 0.5 %
BILIRUB SERPL-MCNC: <0.2 MG/DL (ref 0–1)
BUN SERPL-MCNC: 15 MG/DL (ref 7–20)
CALCIUM SERPL-MCNC: 8.7 MG/DL (ref 8.3–10.6)
CHLORIDE SERPL-SCNC: 103 MMOL/L (ref 99–110)
CREAT SERPL-MCNC: 1 MG/DL (ref 0.9–1.3)
DEPRECATED RDW RBC AUTO: 13.3 % (ref 12.4–15.4)
EOSINOPHIL # BLD: 0.1 K/UL (ref 0–0.6)
EOSINOPHIL NFR BLD: 1.7 %
GFR SERPLBLD CREATININE-BSD FMLA CKD-EPI: >60 ML/MIN/{1.73_M2}
GLUCOSE SERPL-MCNC: 102 MG/DL (ref 70–99)
HCT VFR BLD AUTO: 45.4 % (ref 40.5–52.5)
HGB BLD-MCNC: 15.3 G/DL (ref 13.5–17.5)
LYMPHOCYTES # BLD: 1.5 K/UL (ref 1–5.1)
LYMPHOCYTES NFR BLD: 29.9 %
MCH RBC QN AUTO: 30.7 PG (ref 26–34)
MCHC RBC AUTO-ENTMCNC: 33.6 G/DL (ref 31–36)
MCV RBC AUTO: 91.4 FL (ref 80–100)
MONOCYTES # BLD: 0.4 K/UL (ref 0–1.3)
MONOCYTES NFR BLD: 7.2 %
NEUTROPHILS # BLD: 3.1 K/UL (ref 1.7–7.7)
NEUTROPHILS NFR BLD: 60.7 %
PLATELET # BLD AUTO: 202 K/UL (ref 135–450)
PMV BLD AUTO: 9 FL (ref 5–10.5)
POTASSIUM SERPL-SCNC: 4.1 MMOL/L (ref 3.5–5.1)
PROT SERPL-MCNC: 6.7 G/DL (ref 6.4–8.2)
RBC # BLD AUTO: 4.97 M/UL (ref 4.2–5.9)
SODIUM SERPL-SCNC: 138 MMOL/L (ref 136–145)
T4 FREE SERPL-MCNC: 1 NG/DL (ref 0.9–1.8)
TSH SERPL DL<=0.005 MIU/L-ACNC: 3.47 UIU/ML (ref 0.27–4.2)
WBC # BLD AUTO: 5.1 K/UL (ref 4–11)

## 2024-01-14 LAB
ESTRADIOL SERPL HS-MCNC: 19.6 PG/ML (ref 10–42)
ESTROGEN SERPL CALC-MCNC: 44.6 PG/ML (ref 19–69)
ESTRONE SERPL-MCNC: 25 PG/ML (ref 9–36)
SHBG SERPL-SCNC: 28 NMOL/L (ref 11–80)
TESTOST FREE SERPL-MCNC: 84 PG/ML (ref 47–244)
TESTOST SERPL-MCNC: 379 NG/DL (ref 220–1000)

## 2024-02-21 ENCOUNTER — OFFICE VISIT (OUTPATIENT)
Dept: ENDOCRINOLOGY | Age: 44
End: 2024-02-21
Payer: COMMERCIAL

## 2024-02-21 VITALS
BODY MASS INDEX: 35.32 KG/M2 | HEART RATE: 68 BPM | HEIGHT: 65 IN | WEIGHT: 212 LBS | SYSTOLIC BLOOD PRESSURE: 120 MMHG | DIASTOLIC BLOOD PRESSURE: 90 MMHG | OXYGEN SATURATION: 98 %

## 2024-02-21 DIAGNOSIS — R79.89 LOW TESTOSTERONE: ICD-10-CM

## 2024-02-21 DIAGNOSIS — N46.9 INFERTILITY MALE: ICD-10-CM

## 2024-02-21 PROCEDURE — 99214 OFFICE O/P EST MOD 30 MIN: CPT | Performed by: INTERNAL MEDICINE

## 2024-02-21 NOTE — PROGRESS NOTES
mg/dL Final    BUN 08/21/2023 16  7 - 20 mg/dL Final    Creatinine 08/21/2023 1.1  0.9 - 1.3 mg/dL Final    Est, Glom Filt Rate 08/21/2023 >60  >60 Final    Calcium 08/21/2023 9.1  8.3 - 10.6 mg/dL Final    Total Protein 08/21/2023 6.4  6.4 - 8.2 g/dL Final    Albumin 08/21/2023 4.4  3.4 - 5.0 g/dL Final    Albumin/Globulin Ratio 08/21/2023 2.2  1.1 - 2.2 Final    Total Bilirubin 08/21/2023 0.3  0.0 - 1.0 mg/dL Final    Alkaline Phosphatase 08/21/2023 76  40 - 129 U/L Final    ALT 08/21/2023 15  10 - 40 U/L Final    AST 08/21/2023 20  15 - 37 U/L Final    Testosterone 08/21/2023 338  220 - 1,000 ng/dL Final    Sex Hormone Binding 08/21/2023 20  11 - 80 nmol/L Final    Testosterone, Free 08/21/2023 87.1  47 - 244 pg/mL Final          Controlled substances monitoring: done        Assessment and Plan       There are no diagnoses linked to this encounter.      1: Male hypogonadism  / male infertility   , Free T 5.1 - Sep 2022   TSH 4.44, Free T4 1.2, Free T3 2.9 - Sep 2022   Other labs normal including estrogens in Sep 2022      - Feb 2023    - Aug 2023     , Free T 84 - Jan 2024 . Estrogens normal        C/w Clomid 25 mg or half tab three days a week     The FDA is updating the labels for all prescription testosterone products to warn of their potential for abuse and dependence. Adverse effects linked to testosterone abuse include myocardial infarction, heart failure, stroke, male infertility, depression,  Aggression,  blood clots, prostate hyperplasia/cancer, sleep apnea, acne, and breast enlargement. Men who use a testosterone gel should wash their hands thoroughly after applying a dose and make sure that no one else touches the spots where they medicate. If a woman or child comes into contact with testosterone gels, it can cause side effects in them, including hair growth and premature puberty.    Testosterone abuse often occurs with other anabolic androgenic steroids and at higher doses than

## 2024-02-24 NOTE — FLOWSHEET NOTE
[address] [facility]; Formerly [facility]  Physical Therapy Daily Treatment Note  Date: 10/20/2020  Patient Name:  Nito Lenrer    :  1980  MRN: [id number]  Restrictions/Precautions:  Diagnosis Information:  Diagnosis: R rotator cuff tear  Treatment Diagnosis: R shoulder pain       DATE OF PROCEDURE:  2020     OPERATIONS PERFORMED:  Right shoulder arthroscopy with labral  debridement, open subpectoral biceps tenodesis, arthroscopic subacromial  decompression with rotator cuff repair.     SURGEON: Felix Mann MD     ASSISTANT: Dora Groves MD     ANESTHESIA:  General.     PREOPERATIVE DIAGNOSES:  Rotator cuff tear, biceps tendinopathy with  SLAP tear and subacromial impingement.     POSTOPERATIVE DIAGNOSES:  Rotator cuff tear, biceps tendinopathy with  SLAP tear and subacromial impingement. Insurance/Certification information:  PT Insurance Information: [facility]   Physician Information:  Referring Practitioner: Felix Mann  Has the plan of care been signed (Y/N):        []  Yes  [x]  No     Date of Patient follow up with Physician: per MD      Is this a Progress Report:     []  Yes  [x]  No        If Yes:  Date Range for reporting period:  Beginning2020  Ending    Progress report will be due (10 Rx or 30 days whichever is less):       Recertification will be due (POC Duration  / 90 days whichever is less): 2020        Visit # Insurance Allowable Auth Required    unl 80 []  Yes [x]  No        Functional Scale: UEFS 90%   Date assessed:  2020     Latex Allergy:  [x]NO      []YES  Preferred Language for Healthcare:   [x]English       []other:    Pain level:   0/10    SUBJECTIVE:  \"I am doing pretty good. .still a little weakness sometimes\"      OBJECTIVE:    4/5 scaption, er      ROM PROM AROM  Comment     L R L R     Flexion      176 deg     Abduction      176 deg     ER       88 deg     IR     T12  L5   Other  (cervical)             Other                     L R Comment   Flexion         Abduction         ER         IR         Supraspinatus         Upper Trap         Lower Trap         Mid Trap         Rhomboids         Biceps         Triceps         Horizontal Abduction         Horizontal Adduction         Lats                  Reflexes/Sensation:               [x]? Dermatomes/Myotomes intact               []? Reflexes equal and normal bilaterally               []? Other:     Joint mobility:               []? Normal               [x]? Hypo               []? Hyper      Palpation:      Functional Mobility/Transfers:      Posture:      Bandages/Dressings/Incisions:     Gait: (include devices/WB status):       RESTRICTIONS/PRECAUTIONS: RCR, bicep tenodesis protocols    Exercises/Interventions: DOS 5/6/20, MD appt.  9/8/20  Exercises:  Exercise/Equipment Resistance/Repetitions Other comments   Stretching/PROM     Wand             Pulleys 5'    Tball up wall: flex, abd x20 ea                Ll/ld gentle bicep     Wall walk     Sitting SB rollout + wedge                                               Biceps     Triceps          PRE's     Supine raises  2# - 25x     Supine Chest Press     SA punch  ABC's X 30 2#  X 1 1#    External Rotation sidelying - active: 30x 1#    Side lying Abd 30x    ABC ball on wall yellow x 1     Shrugs     EXT     Reverse Flys     Serratus     Prone row / ext/m trap  X 30 3# ea; 25; 25xx 1/2     Wall mini push ups 15x     Standing raises (scaption) 15x, 15x 1#    Retraction / ext 30x ea blue    [employer] 8x     [place] on floor scaption, CCW,CW    Wall push ups  15x    Cable Column/Theraband 30x rows -20#; 30x pull downs 15#      External Rotation     Internal Rotation     Shrugs     Lats     Ext     Flex     Scapular Retraction     BIC     TRIC     PNF     Quadruped weight shifts     Mini push ups on bosu       20x        Dynamic Stability          [place]ly[place]ack          Manual interventions yes Prom/stm  10'                Therapeutic Exercise and NMR EXR  [x] (62370) Provided verbal/tactile cueing for activities related to strengthening, flexibility, endurance, ROM  for improvements in scapular, scapulothoracic and UE control with self care, reaching, carrying, lifting, house/yardwork, driving/computer work.    [] (95293) Provided verbal/tactile cueing for activities related to improving balance, coordination, kinesthetic sense, posture, motor skill, proprioception  to assist with  scapular, scapulothoracic and UE control with self care, reaching, carrying, lifting, house/yardwork, driving/computer work. Therapeutic Activities:    [] (10047 or 22206) Provided verbal/tactile cueing for activities related to improving balance, coordination, kinesthetic sense, posture, motor skill, proprioception and motor activation to allow for proper function of scapular, scapulothoracic and UE control with self care, carrying, lifting, driving/computer work.      Home Exercise Program:    [x] (96500) Reviewed/Progressed HEP activities related to strengthening, flexibility, endurance, ROM of scapular, scapulothoracic and UE control with self care, reaching, carrying, lifting, house/yardwork, driving/computer work  [] (46671) Reviewed/Progressed HEP activities related to improving balance, coordination, kinesthetic sense, posture, motor skill, proprioception of scapular, scapulothoracic and UE control with self care, reaching, carrying, lifting, house/yardwork, driving/computer work      Manual Treatments:  PROM / STM / Oscillations-Mobs:  G-I, II, III, IV (PA's, Inf., Post.)  [] (88139) Provided manual therapy to mobilize soft tissue/joints of cervical/CT, scapular GHJ and UE for the purpose of modulating pain, promoting relaxation,  increasing ROM, reducing/eliminating soft tissue swelling/inflammation/restriction, improving soft tissue extensibility and allowing for proper ROM for normal function with self care, reaching, carrying, lifting, house/yardwork, driving/computer work    Modalities:  CP 15'    Charges:  Timed Code Treatment Minutes: 50'   Total Treatment Minutes: 50'        [] EVAL (LOW) 78665 (typically 20 minutes face-to-face)  [] EVAL (MOD) 13791 (typically 30 minutes face-to-face)  [] EVAL (HIGH) 20300 (typically 45 minutes face-to-face)  [] RE-EVAL     [x] QN(12268) x  1   [] IONTO  [x] NMR (79316)  X 1   [] VASO  [x] Manual (98290) x  1  [] Other:  [] TA x      [] Mech Traction (87477)  [] ES(attended) (30786)      [] ES (un) (59643):     GOALS:  Patient stated goal: return to running,golf, playing with young kids    []? Progressing: []? Met: []? Not Met: []? Adjusted     Therapist goals for Patient:   Short Term Goals: To be achieved in: 2 -4 weeks  1. Independent in HEP and progression per patient tolerance, in order to prevent re-injury. [x]? Progressing: []? Met: []? Not Met: []? Adjusted   2. Patient will have a decrease in pain to facilitate improvement in movement, function, and ADLs as indicated by Functional Deficits. [x]? Progressing: []? Met: []? Not Met: []? Adjusted     Long Term Goals: To be achieved in: 12 weeks  1. Disability index score of 20% or less for the UEFS to assist with reaching prior level of function. [x]? Progressing: []? Met: []? Not Met: []? Adjusted  2. Patient will demonstrate increased AROM to WNL to allow for proper joint functioning as indicated by patients Functional Deficits. [x]? Progressing: []? Met: []? Not Met: []? Adjusted  3. Patient will demonstrate an increase in strength to good scapular and core control  for UE to allow for proper functional mobility as indicated by patients Functional Deficits. [x]? Progressing: []? Met: []? Not Met: []? Adjusted  4. Patient will return to light adls personal hygiene, dressing, small meal prep functional activities without increased symptoms or restriction. [x]? Progressing: []? Met: []?  Not Met: []? Adjusted  5. Patient will return to heavy adls yardwork, lifting greater 20+# functional activities without increased symptoms or restriction  [x]? Progressing: []? Met: []? Not Met: []? Adjusted        Overall Progression Towards Functional goals/ Treatment Progress Update:  [] Patient is progressing as expected towards functional goals listed. [] Progression is slowed due to complexities/Impairments listed. [] Progression has been slowed due to co-morbidities. [x] Plan just implemented, too soon to assess goals progression <30days   [] Goals require adjustment due to lack of progress  [] Patient is not progressing as expected and requires additional follow up with physician  [] Other    Prognosis for POC: [x] Good [] Fair  [] Poor      Patient requires continued skilled intervention: [x] Yes  [] No    Treatment/Activity Tolerance:  [x] Patient able to complete treatment  [] Patient limited by fatigue  [] Patient limited by pain     [] Patient limited by other medical complications  [] Other:   Pt demonstrated independence with current program and compliance with surgical restrictions. ASSESSMENT:  Pt has mostly normalized his AROM except for IR. He continues to need some cueing for improved recruitment of scapular retractors but otherwise progressing well with his strengthening. Patient Education:   Reviewed diagnosis, POC, HEP and its importance. HEP instruction:    Access Code: [email or web address]   URL: [email or web address]   Date: 05/12/2020   Prepared by: Sandor Sotobody     Exercises   · Seated Shoulder Shrugs - 10 reps - 3 sets - 1 - 2 hold - 3x daily - 7x weekly   · Seated Scapular Retraction - 10 reps - 3 sets - 1-2 hold - 3x daily - 7x weekly   · Supported Elbow Flexion Extension PROM - 10 reps - 1 sets - 10 hold - 3x daily - 7x weekly   · Putty Squeezes - 10 reps - 1 sets - 10 hold - 3x daily - 7x weekly   · Seated Cervical Sidebending Stretch - 5 reps - 3 sets - 30 hold - 3x daily - 7x weekly   · Seated Shoulder Flexion Towel Slide at Table Top - 10 reps - 1 sets - 10 hold - 3x daily - 7x weekly   Seated Shoulder Abduction Towel Slide at Table Top - 10 reps - 1 sets - 10 hold - 3x daily - 7x weekly      PLAN:   [x] Continue per plan of care [] Alter current plan (see comments above)  [] Plan of care initiated [] Hold pending MD visit [] Discharge    Therapist was present, directed the patient's care, made skilled judgement, and was responsible for assessment and treatment of the patient. Electronically signed by:       Note: If patient does not return for scheduled/ recommended follow up visits, this note will serve as a discharge from care along with most recent update on progress.

## 2024-05-14 RX ORDER — FENOFIBRATE 160 MG/1
160 TABLET ORAL DAILY
Qty: 90 TABLET | Refills: 3 | Status: SHIPPED | OUTPATIENT
Start: 2024-05-14

## 2024-05-14 NOTE — TELEPHONE ENCOUNTER
Medication:   Requested Prescriptions     Pending Prescriptions Disp Refills    fenofibrate (TRIGLIDE) 160 MG tablet 90 tablet 3     Sig: Take 1 tablet by mouth daily     Last Filled: 10.10.23    Last appt: 8/30/2023   Next appt: Visit date not found  Return in about 1 year (around 8/30/2024).   Last OARRS:       No data to display

## 2024-06-06 NOTE — PROGRESS NOTES
The [facility] and [facility]  [address], [place], [facility]  Phone: [phone]   Fax:     [phone]                                                      Physical Therapy Re-Certification Plan of Care    Dear dr Gi Shetty  ,    We had the pleasure of treating the following patient for physical therapy services at [address] [place] [address]. A summary of our findings can be found in the updated assessment below. This includes our plan of care. If you have any questions or concerns regarding these findings, please do not hesitate to contact me at the office phone number checked above. Thank you for the referral.       Physician Signature:_______________________________Date:__________________  By signing above (or electronic signature), therapists plan is approved by physician      Patient: Patsy Cortes   : 1980   MRN: [id number]  Referring Physician:        Evaluation Date: 2020     Medical Diagnosis Information:  ·   Diagnosis: R rotator cuff tear  · Treatment Diagnosis: R shoulder pain - m25. [address] [place] information:       Date Range:  -    Total visits: 20          SUBJECTIVE: \"I am feeling pretty good. .\"       Pain Scale:  5/10  :     OBJECTIVE:   Impairments:    ROM:  Decreased arom 20%       Strength/Neuromuscular control:  4/5 safe zone       ASSESSMENT:    Response to Treatment:   - Patient is responding well to treatment and improvement is noted with regards  to goals    Updated Functional deficiencies/Impairments:     The patient demonstrated at least  21% but less than  23% impairment, limitation or restriction in:   -- carrying, moving and handling objects.  - Decreased upper extremity functional strength and neuromuscular control - Reduced overall functional level with carrying /lifting  - Noted GHJ joint hypomobility- Reduced overall functional level with carrying /lifting   - Noted cervicothoracic joint hypomobility- Reduced overall functional level with carrying /lifting   - Pain/difficulty with driving and/or computer work- Reduced overall functional level   - Pain/difficulty associated with self care tasks- Reduced overall functional level and ADL status  - Pain/difficulty with lifting/reaching/carrying - Reduced overall functional level with carrying and lifting  - Pain/difficulty with household work- Reduced ADL status  - Unable to perform sport/recreational activity due to pain and dysfunction    Updated Classification:  - signs/symptoms consistent with post-surgical status including decreased ROM, strength and function. - bony or soft tissue surgical procedure. (Pattern 4I)  - ligament or other connective tissue dysfunction. (Pattern 4D)  - localized inflammation. (Pattern 4E)      Prognosis/Rehab Potential:       - Good   Factors affecting rehab potential:  - associated co-morbidities  -  Tolerance of evaluation/treatment:     - Good     New/Updated Goals (if applicable):  [] No change to goals established upon initial eval/last progress note:  New Goals: 6 weeks     1. I with hep  2. Full arom  3. 4+/5 global shoulder   4. 4/5 scapula strength         GOALS:      Plan of Care:  [x] Continue Current Therapy Intervention    Frequency/Duration:  2 days per week for 8  Weeks:  Interventions:  1. Therapeutic exercise including: strength training, ROM, NMR and proprioception for the scapula, core and Upper extremity  2. Manual therapy as indicated including Dry Needling/IASTM, STM, PROM, Gr I-IV mobilizations, spinal mobilization/manipulation. 3. Modalities as needed including: thermal agents, E-stim, US, iontophoresis as indicated. 4. Patient education on joint protection, activity modification, progression of HEP.         Electronically signed by:  Shashank Sidhu PT, MPT needs device

## 2024-11-15 DIAGNOSIS — N46.9 INFERTILITY MALE: ICD-10-CM

## 2024-11-15 DIAGNOSIS — R79.89 LOW TESTOSTERONE: ICD-10-CM

## 2024-11-15 LAB
ALBUMIN SERPL-MCNC: 4.3 G/DL (ref 3.4–5)
ALBUMIN/GLOB SERPL: 2 {RATIO} (ref 1.1–2.2)
ALP SERPL-CCNC: 78 U/L (ref 40–129)
ALT SERPL-CCNC: 16 U/L (ref 10–40)
ANION GAP SERPL CALCULATED.3IONS-SCNC: 11 MMOL/L (ref 3–16)
AST SERPL-CCNC: 19 U/L (ref 15–37)
BASOPHILS # BLD: 0 K/UL (ref 0–0.2)
BASOPHILS NFR BLD: 0.6 %
BILIRUB SERPL-MCNC: 0.3 MG/DL (ref 0–1)
BUN SERPL-MCNC: 13 MG/DL (ref 7–20)
CALCIUM SERPL-MCNC: 9.6 MG/DL (ref 8.3–10.6)
CHLORIDE SERPL-SCNC: 105 MMOL/L (ref 99–110)
CO2 SERPL-SCNC: 25 MMOL/L (ref 21–32)
CREAT SERPL-MCNC: 1.1 MG/DL (ref 0.9–1.3)
DEPRECATED RDW RBC AUTO: 13 % (ref 12.4–15.4)
EOSINOPHIL # BLD: 0.1 K/UL (ref 0–0.6)
EOSINOPHIL NFR BLD: 1.9 %
GFR SERPLBLD CREATININE-BSD FMLA CKD-EPI: 85 ML/MIN/{1.73_M2}
GLUCOSE SERPL-MCNC: 88 MG/DL (ref 70–99)
HCT VFR BLD AUTO: 45.8 % (ref 40.5–52.5)
HGB BLD-MCNC: 15.5 G/DL (ref 13.5–17.5)
LYMPHOCYTES # BLD: 1.6 K/UL (ref 1–5.1)
LYMPHOCYTES NFR BLD: 23.8 %
MCH RBC QN AUTO: 30.8 PG (ref 26–34)
MCHC RBC AUTO-ENTMCNC: 34 G/DL (ref 31–36)
MCV RBC AUTO: 90.8 FL (ref 80–100)
MONOCYTES # BLD: 0.5 K/UL (ref 0–1.3)
MONOCYTES NFR BLD: 7.3 %
NEUTROPHILS # BLD: 4.4 K/UL (ref 1.7–7.7)
NEUTROPHILS NFR BLD: 66.4 %
PLATELET # BLD AUTO: 278 K/UL (ref 135–450)
PMV BLD AUTO: 8.6 FL (ref 5–10.5)
POTASSIUM SERPL-SCNC: 4.4 MMOL/L (ref 3.5–5.1)
PROT SERPL-MCNC: 6.4 G/DL (ref 6.4–8.2)
PSA SERPL DL<=0.01 NG/ML-MCNC: 0.8 NG/ML (ref 0–4)
RBC # BLD AUTO: 5.04 M/UL (ref 4.2–5.9)
SODIUM SERPL-SCNC: 141 MMOL/L (ref 136–145)
WBC # BLD AUTO: 6.7 K/UL (ref 4–11)

## 2024-11-19 LAB
SHBG SERPL-SCNC: 19 NMOL/L (ref 10–60)
TESTOST FREE SERPL-MCNC: 76.8 PG/ML (ref 47–244)
TESTOST SERPL-MCNC: 295 NG/DL (ref 249–836)

## 2024-11-21 ENCOUNTER — OFFICE VISIT (OUTPATIENT)
Dept: ENDOCRINOLOGY | Age: 44
End: 2024-11-21
Payer: COMMERCIAL

## 2024-11-21 VITALS
SYSTOLIC BLOOD PRESSURE: 143 MMHG | HEIGHT: 65 IN | OXYGEN SATURATION: 98 % | WEIGHT: 210.4 LBS | HEART RATE: 78 BPM | DIASTOLIC BLOOD PRESSURE: 97 MMHG | BODY MASS INDEX: 35.06 KG/M2

## 2024-11-21 DIAGNOSIS — N46.9 INFERTILITY MALE: ICD-10-CM

## 2024-11-21 DIAGNOSIS — R79.89 LOW TESTOSTERONE: Primary | ICD-10-CM

## 2024-11-21 DIAGNOSIS — R79.89 HIGH SERUM THYROID STIMULATING HORMONE (TSH): ICD-10-CM

## 2024-11-21 PROCEDURE — 99214 OFFICE O/P EST MOD 30 MIN: CPT | Performed by: INTERNAL MEDICINE

## 2024-11-21 RX ORDER — CLOMIPHENE CITRATE 50 MG/1
TABLET ORAL
Qty: 20 TABLET | Refills: 3 | Status: SHIPPED | OUTPATIENT
Start: 2024-11-21

## 2024-11-21 NOTE — PROGRESS NOTES
stroke, male infertility, depression,  Aggression,  blood clots, prostate hyperplasia/cancer, sleep apnea, acne, and breast enlargement. Men who use a testosterone gel should wash their hands thoroughly after applying a dose and make sure that no one else touches the spots where they medicate. If a woman or child comes into contact with testosterone gels, it can cause side effects in them, including hair growth and premature puberty.    Testosterone abuse often occurs with other anabolic androgenic steroids and at higher doses than what is prescribed. Withdrawal symptoms have included fatigue, irritability, loss of appetite, reduced libido, and insomnia.    2: ED   If Testosterone work up is normal   Will send Viagra or Cialis prn     3: High TSH   Repeat TSH normal with thyroid antibodies - Feb 2023   TSH 3.47, Free T4 1.0 Jan 2024     Repeat levels in 1 year    4: fasting hyperglycemia   Impaired fasting sugar   102 after black decaff coffee   A1C was normal Aug 2023     Fasting sugar normal - Nov 2024     We need to work on diet   Weight loss will help TT levels       RTC in 6 months with CBC, CMP, TT,  TFTs   Will skip estrogens and TFTs        Electronically signed by DINO PORTER MD on 11/21/2024 at 11:14 AM

## 2025-04-09 ENCOUNTER — PATIENT MESSAGE (OUTPATIENT)
Dept: PRIMARY CARE CLINIC | Age: 45
End: 2025-04-09

## 2025-04-09 ENCOUNTER — PATIENT MESSAGE (OUTPATIENT)
Dept: ENDOCRINOLOGY | Age: 45
End: 2025-04-09

## 2025-04-09 DIAGNOSIS — R79.89 LOW TESTOSTERONE: ICD-10-CM

## 2025-04-09 DIAGNOSIS — N46.9 INFERTILITY MALE: ICD-10-CM

## 2025-04-09 RX ORDER — FENOFIBRATE 160 MG/1
160 TABLET ORAL DAILY
Qty: 90 TABLET | Refills: 0 | Status: SHIPPED | OUTPATIENT
Start: 2025-04-09

## 2025-04-10 RX ORDER — CLOMIPHENE CITRATE 50 MG/1
TABLET ORAL
Qty: 20 TABLET | Refills: 3 | OUTPATIENT
Start: 2025-04-10

## 2025-05-14 ENCOUNTER — PATIENT MESSAGE (OUTPATIENT)
Dept: PRIMARY CARE CLINIC | Age: 45
End: 2025-05-14

## 2025-05-14 RX ORDER — FENOFIBRATE 160 MG/1
160 TABLET ORAL DAILY
Qty: 90 TABLET | Refills: 0 | OUTPATIENT
Start: 2025-05-14

## 2025-05-15 DIAGNOSIS — R79.89 LOW TESTOSTERONE: ICD-10-CM

## 2025-05-15 DIAGNOSIS — R79.89 HIGH SERUM THYROID STIMULATING HORMONE (TSH): ICD-10-CM

## 2025-05-15 DIAGNOSIS — N46.9 INFERTILITY MALE: ICD-10-CM

## 2025-05-15 LAB
ALBUMIN SERPL-MCNC: 4.4 G/DL (ref 3.4–5)
ALBUMIN/GLOB SERPL: 2.2 {RATIO} (ref 1.1–2.2)
ALP SERPL-CCNC: 78 U/L (ref 40–129)
ALT SERPL-CCNC: 21 U/L (ref 10–40)
ANION GAP SERPL CALCULATED.3IONS-SCNC: 11 MMOL/L (ref 3–16)
AST SERPL-CCNC: 25 U/L (ref 15–37)
BASOPHILS # BLD: 0 K/UL (ref 0–0.2)
BASOPHILS NFR BLD: 0.7 %
BILIRUB SERPL-MCNC: <0.2 MG/DL (ref 0–1)
BUN SERPL-MCNC: 15 MG/DL (ref 7–20)
CALCIUM SERPL-MCNC: 9.6 MG/DL (ref 8.3–10.6)
CHLORIDE SERPL-SCNC: 107 MMOL/L (ref 99–110)
CO2 SERPL-SCNC: 25 MMOL/L (ref 21–32)
CREAT SERPL-MCNC: 1.1 MG/DL (ref 0.9–1.3)
DEPRECATED RDW RBC AUTO: 13.2 % (ref 12.4–15.4)
EOSINOPHIL # BLD: 0.1 K/UL (ref 0–0.6)
EOSINOPHIL NFR BLD: 2.2 %
GFR SERPLBLD CREATININE-BSD FMLA CKD-EPI: 85 ML/MIN/{1.73_M2}
GLUCOSE SERPL-MCNC: 93 MG/DL (ref 70–99)
HCT VFR BLD AUTO: 43.7 % (ref 40.5–52.5)
HGB BLD-MCNC: 14.9 G/DL (ref 13.5–17.5)
LYMPHOCYTES # BLD: 1.7 K/UL (ref 1–5.1)
LYMPHOCYTES NFR BLD: 31.6 %
MCH RBC QN AUTO: 31 PG (ref 26–34)
MCHC RBC AUTO-ENTMCNC: 34.2 G/DL (ref 31–36)
MCV RBC AUTO: 90.5 FL (ref 80–100)
MONOCYTES # BLD: 0.3 K/UL (ref 0–1.3)
MONOCYTES NFR BLD: 6 %
NEUTROPHILS # BLD: 3.2 K/UL (ref 1.7–7.7)
NEUTROPHILS NFR BLD: 59.5 %
PLATELET # BLD AUTO: 214 K/UL (ref 135–450)
PMV BLD AUTO: 9 FL (ref 5–10.5)
POTASSIUM SERPL-SCNC: 4.2 MMOL/L (ref 3.5–5.1)
PROT SERPL-MCNC: 6.4 G/DL (ref 6.4–8.2)
RBC # BLD AUTO: 4.83 M/UL (ref 4.2–5.9)
SODIUM SERPL-SCNC: 143 MMOL/L (ref 136–145)
T4 FREE SERPL-MCNC: 1 NG/DL (ref 0.9–1.8)
TSH SERPL DL<=0.005 MIU/L-ACNC: 2.37 UIU/ML (ref 0.27–4.2)
WBC # BLD AUTO: 5.4 K/UL (ref 4–11)

## 2025-05-16 LAB
SHBG SERPL-SCNC: 23 NMOL/L (ref 10–60)
TESTOST FREE SERPL-MCNC: 95.6 PG/ML (ref 47–244)
TESTOST SERPL-MCNC: 389 NG/DL (ref 249–836)

## 2025-05-20 ASSESSMENT — PATIENT HEALTH QUESTIONNAIRE - PHQ9
2. FEELING DOWN, DEPRESSED OR HOPELESS: NOT AT ALL
SUM OF ALL RESPONSES TO PHQ QUESTIONS 1-9: 0
1. LITTLE INTEREST OR PLEASURE IN DOING THINGS: NOT AT ALL
SUM OF ALL RESPONSES TO PHQ9 QUESTIONS 1 & 2: 0
1. LITTLE INTEREST OR PLEASURE IN DOING THINGS: NOT AT ALL
2. FEELING DOWN, DEPRESSED OR HOPELESS: NOT AT ALL

## 2025-05-21 ENCOUNTER — OFFICE VISIT (OUTPATIENT)
Dept: ENDOCRINOLOGY | Age: 45
End: 2025-05-21
Payer: COMMERCIAL

## 2025-05-21 VITALS
RESPIRATION RATE: 16 BRPM | HEART RATE: 73 BPM | OXYGEN SATURATION: 93 % | HEIGHT: 65 IN | SYSTOLIC BLOOD PRESSURE: 131 MMHG | BODY MASS INDEX: 35.99 KG/M2 | WEIGHT: 216 LBS | DIASTOLIC BLOOD PRESSURE: 98 MMHG

## 2025-05-21 DIAGNOSIS — N46.9 INFERTILITY MALE: ICD-10-CM

## 2025-05-21 DIAGNOSIS — R79.89 HIGH SERUM THYROID STIMULATING HORMONE (TSH): ICD-10-CM

## 2025-05-21 DIAGNOSIS — R79.89 LOW TESTOSTERONE: Primary | ICD-10-CM

## 2025-05-21 PROCEDURE — 99214 OFFICE O/P EST MOD 30 MIN: CPT | Performed by: INTERNAL MEDICINE

## 2025-05-21 NOTE — PROGRESS NOTES
Patient ID:   Juan Hager is a 45 y.o. male    Chief Complaint:   Juan Hager is seen for an evaluation of low Testosterone     Subjective:   Juan Hager had TT of 221, Free T of 57.5 - Aug 2022 (checked around 11:37am). It was checked for ED.      Interval history:     Clomid 25 mg three days a week    Denies missing doses     Libido is stable 6-7/10, performance  6 /10  Has a partner   Energy is fine. No recent soreness of muscles. Runs 2-4 miles , two days a week.   Morning erections- 2-3 times per week     No difficulty in concentrating at work. He is a  ([place]). Has PTSD. Sees psychologist. Sleep is better. Exercising (running 2-3 per week) helping sleep      Has stable depression or anxiety . Takes edibles THCs    FOV:     Denies nipple discharge or gynecomastia     Never had head traumas   Fathered two children. No plans to have more kids. S/p vasectomy.     History of drug abuse: denies   Die sit smoke  Drinks wine twice a week       The following portions of the patient's history were reviewed and updated as appropriate:        Family History   Problem Relation Age of Onset    High Blood Pressure Mother     High Cholesterol Mother     Heart Disease Father 50        ami    High Blood Pressure Father     High Cholesterol Father     High Cholesterol Brother     High Cholesterol Brother     Emphysema Maternal Grandmother     High Blood Pressure Maternal Grandmother     High Cholesterol Maternal Grandmother     High Blood Pressure Maternal Grandfather     High Cholesterol Maternal Grandfather     Heart Disease Paternal Grandmother         CHF    High Blood Pressure Paternal Grandmother     High Cholesterol Paternal Grandmother     Heart Failure Paternal Grandfather 80    Heart Disease Paternal Grandfather         CHF    High Blood Pressure Paternal Grandfather     High Cholesterol Paternal Grandfather          Social History     Socioeconomic History    Marital status:

## 2025-05-22 ENCOUNTER — RESULTS FOLLOW-UP (OUTPATIENT)
Dept: ENDOCRINOLOGY | Age: 45
End: 2025-05-22

## 2025-05-22 ENCOUNTER — OFFICE VISIT (OUTPATIENT)
Dept: PRIMARY CARE CLINIC | Age: 45
End: 2025-05-22
Payer: COMMERCIAL

## 2025-05-22 VITALS
WEIGHT: 214 LBS | SYSTOLIC BLOOD PRESSURE: 124 MMHG | HEART RATE: 81 BPM | RESPIRATION RATE: 13 BRPM | BODY MASS INDEX: 35.65 KG/M2 | DIASTOLIC BLOOD PRESSURE: 80 MMHG | OXYGEN SATURATION: 99 % | TEMPERATURE: 97.6 F | HEIGHT: 65 IN

## 2025-05-22 DIAGNOSIS — K58.0 IRRITABLE BOWEL SYNDROME WITH DIARRHEA: ICD-10-CM

## 2025-05-22 DIAGNOSIS — E78.2 MIXED HYPERLIPIDEMIA: ICD-10-CM

## 2025-05-22 DIAGNOSIS — R73.9 HYPERGLYCEMIA: ICD-10-CM

## 2025-05-22 DIAGNOSIS — Z00.00 PHYSICAL EXAM, ANNUAL: Primary | ICD-10-CM

## 2025-05-22 DIAGNOSIS — R79.89 LOW TESTOSTERONE: ICD-10-CM

## 2025-05-22 LAB
ESTRADIOL SERPL HS-MCNC: 23 PG/ML (ref 10–42)
ESTROGEN SERPL CALC-MCNC: 52.9 PG/ML (ref 19–69)
ESTRONE SERPL-MCNC: 29.9 PG/ML (ref 9–36)

## 2025-05-22 PROCEDURE — 99396 PREV VISIT EST AGE 40-64: CPT | Performed by: INTERNAL MEDICINE

## 2025-05-22 RX ORDER — FENOFIBRATE 160 MG/1
160 TABLET ORAL DAILY
Qty: 90 TABLET | Refills: 1 | Status: SHIPPED | OUTPATIENT
Start: 2025-05-22

## 2025-05-22 RX ORDER — DUPILUMAB 300 MG/2ML
45000 INJECTION, SOLUTION SUBCUTANEOUS
Qty: 3.92 ML | Status: CANCELLED | OUTPATIENT
Start: 2025-05-22

## 2025-05-22 RX ORDER — CICLOPIROX 80 MG/ML
SOLUTION TOPICAL
Qty: 12 ML | Refills: 1 | Status: SHIPPED | OUTPATIENT
Start: 2025-05-22

## 2025-05-22 SDOH — ECONOMIC STABILITY: FOOD INSECURITY: WITHIN THE PAST 12 MONTHS, THE FOOD YOU BOUGHT JUST DIDN'T LAST AND YOU DIDN'T HAVE MONEY TO GET MORE.: NEVER TRUE

## 2025-05-22 SDOH — ECONOMIC STABILITY: FOOD INSECURITY: WITHIN THE PAST 12 MONTHS, YOU WORRIED THAT YOUR FOOD WOULD RUN OUT BEFORE YOU GOT MONEY TO BUY MORE.: NEVER TRUE

## 2025-05-22 NOTE — PROGRESS NOTES
Chief complaints:  Juan Hager is a 45 y.o. male who presents to the office for a general physical examination.  History of present illness:  Here for a physical,  Mixed hyperlipidemia  This has been a long standing problem, takes fenofibrate      Monitors diet and tries to follow a low fat diet. Has  been reasonably  compliant w exercise. Lipids have been stable, The problem is controlled. Recent lipid tests were reviewed and are normal. Pertinent negatives include no chest pain, focal sensory loss, focal weakness, leg pain, myalgias or shortness of breath.  Advised patient to continue the current instructions or medications.       Irritable bowel syndrome  On fiber supplements,  Patient is compliant w medications, no side effects, effective, provides adequate symptom relief. No new symptoms or problems as noted by patient.  The problem is stable, no changes noted by patient. Will consider monitoring labs and refill medications as appropriate. Patient counseled and will continue current plan.      Low testosterone  Seeing rodriguez for this,     Past Medical History:   Diagnosis Date    Anxiety 8/1/2003    Depression 8/1/2003    Eczema     Erectile dysfunction 8/1/2003    Headache 8/1/2003    Irritable bowel syndrome 8/1/2003    Mixed hyperlipidemia 12/09/2011    Seasonal allergic rhinitis     ait in past    Traumatic incomplete tear of right rotator cuff 05/01/2020    Wears glasses     READING     Current Outpatient Medications   Medication Sig Dispense Refill    fenofibrate 160 MG tablet Take 1 tablet by mouth daily 90 tablet 1    clomiPHENE (CLOMID) 50 MG tablet TAKE 1/2  TABLET BY MOUTH THREE  TIMES A WEEK (MONDAY, WEDNESDAY, AND FRIDAY) 20 tablet 3    hydrOXYzine HCl (ATARAX) 25 MG tablet Take 1 tablet by mouth as needed      Cetirizine HCl (ZYRTEC ALLERGY PO) Take by mouth as needed      DUPIXENT 300 MG/2ML SOSY injection Inject 300 mLs as directed every 14 days      hydrocortisone 2.5 % ointment

## 2025-05-22 NOTE — ASSESSMENT & PLAN NOTE
On fiber supplements,  Patient is compliant w medications, no side effects, effective, provides adequate symptom relief. No new symptoms or problems as noted by patient.  The problem is stable, no changes noted by patient. Will consider monitoring labs and refill medications as appropriate. Patient counseled and will continue current plan.

## 2025-06-11 ENCOUNTER — RESULTS FOLLOW-UP (OUTPATIENT)
Dept: PRIMARY CARE CLINIC | Age: 45
End: 2025-06-11

## 2025-06-11 DIAGNOSIS — E78.2 MIXED HYPERLIPIDEMIA: ICD-10-CM

## 2025-06-11 DIAGNOSIS — R73.9 HYPERGLYCEMIA: ICD-10-CM

## 2025-06-11 LAB
CHOLEST SERPL-MCNC: 223 MG/DL (ref 0–199)
EST. AVERAGE GLUCOSE BLD GHB EST-MCNC: 105.4 MG/DL
HBA1C MFR BLD: 5.3 %
HDLC SERPL-MCNC: 39 MG/DL (ref 40–60)
LDLC SERPL CALC-MCNC: 138 MG/DL
TRIGL SERPL-MCNC: 232 MG/DL (ref 0–150)
VLDLC SERPL CALC-MCNC: 46 MG/DL

## 2025-06-25 RX ORDER — FENOFIBRATE 160 MG/1
160 TABLET ORAL DAILY
Qty: 90 TABLET | Refills: 1 | Status: SHIPPED | OUTPATIENT
Start: 2025-06-25

## 2025-06-25 NOTE — TELEPHONE ENCOUNTER
Medication:   Requested Prescriptions     Pending Prescriptions Disp Refills    fenofibrate 160 MG tablet [Pharmacy Med Name: FENOFIBRATE TABS 160MG]  0     Last Filled:  05/22/2025    Last appt: 5/22/2025   Next appt: Visit date not found    Last OARRS:        No data to display

## (undated) DEVICE — BLANKET WRM W40.2XL55.9IN IORT LO BODY + MISTRAL AIR

## (undated) DEVICE — BLADE ES ELASTOMERIC COAT INSUL DURABLE BEND UPTO 90DEG

## (undated) DEVICE — TUBING PMP L16FT MAIN DISP FOR AR-6400 AR-6475

## (undated) DEVICE — CANNULA ARTHSCP L7CM DIA7MM TRNSLUC THRD FLX W/ NO SQUIRT

## (undated) DEVICE — PROTECTOR ULN NRV PUR FOAM HK LOOP STRP ANATOMICALLY

## (undated) DEVICE — SUTURE VCRL + SZ 3-0 L27IN ABSRB UD L26MM SH 1/2 CIR VCP416H

## (undated) DEVICE — DRAPE 70X60IN SPLIT IMPERV ADHES STRIP

## (undated) DEVICE — KIT SHLDR STBL MARCO FOR SPIDER LIMB POS

## (undated) DEVICE — GLOVE SURG SZ 9 L1185IN FNGR THK75MIL STRW LTX POLYMER BEAD

## (undated) DEVICE — APPLICATOR MEDICATED 26 CC SOLUTION HI LT ORNG CHLORAPREP

## (undated) DEVICE — SURE SET-DOUBLE BASIN-LF: Brand: MEDLINE INDUSTRIES, INC.

## (undated) DEVICE — NEEDLE SPNL L3.5IN PNK HUB S STL REG WALL FIT STYL W/ QNCKE

## (undated) DEVICE — DBD-PACK,SHOULDER III: Brand: MEDLINE

## (undated) DEVICE — LIQUIBAND RAPID ADHESIVE 36/CS 0.8ML: Brand: MEDLINE

## (undated) DEVICE — SPONGE GZ W4XL8IN COT WVN 12 PLY

## (undated) DEVICE — 3M™ STERI-DRAPE™ U-DRAPE 1067 1067 5/BX 4BX/CS/CTN&#X20;: Brand: STERI-DRAPE™

## (undated) DEVICE — GOWN,SIRUS,POLYRNF,BRTHSLV,XLN/XXL,18/CS: Brand: MEDLINE

## (undated) DEVICE — GARMENT,MEDLINE,DVT,INT,CALF,MED, GEN2: Brand: MEDLINE

## (undated) DEVICE — PAD DRY FLOOR ABS 32X58IN GRN

## (undated) DEVICE — JEWISH HOSPITAL TURNOVER KIT: Brand: MEDLINE INDUSTRIES, INC.

## (undated) DEVICE — SUTURE VCRL SZ 3-0 L27IN ABSRB UD L26MM CT-2 1/2 CIR J232H

## (undated) DEVICE — 3M™ STERI-DRAPE™ U-DRAPE 1015: Brand: STERI-DRAPE™

## (undated) DEVICE — BLADE SHV L13CM DIA4MM EXCALIBUR AGG COOLCUT

## (undated) DEVICE — PLATE ES AD W 9FT CRD 2

## (undated) DEVICE — STERILE POLYISOPRENE POWDER-FREE SURGICAL GLOVES WITH EMOLLIENT COATING: Brand: PROTEXIS

## (undated) DEVICE — SURGICAL SET UP - SURE SET: Brand: MEDLINE INDUSTRIES, INC.

## (undated) DEVICE — GENERAL: Brand: MEDLINE INDUSTRIES, INC.

## (undated) DEVICE — SHEET, T, LAPAROTOMY, STERILE: Brand: MEDLINE

## (undated) DEVICE — PAD,NON-ADHERENT,3X8,STERILE,LF,1/PK: Brand: MEDLINE

## (undated) DEVICE — GOWN,SIRUS,POLYRNF,BRTHSLV,XL,30/CS: Brand: MEDLINE

## (undated) DEVICE — STOCKINETTE ORTH W9XL36IN COT 2 PLY HLLW FOR HANDLING LMB

## (undated) DEVICE — PAD,ABDOMINAL,5"X9",ST,LF,25/BX: Brand: MEDLINE INDUSTRIES, INC.

## (undated) DEVICE — SUTURE ETHBND EXCEL SZ 0 L18IN NONABSORBABLE GRN L26MM MO-6 CX45D

## (undated) DEVICE — GLOVE SURG SZ 7 L12IN FNGR THK75MIL WHT LTX POLYMER BEAD

## (undated) DEVICE — BUR SHV L13CM DIA4MM 8 FLUT OVL FOR RAP AGG BNE RESECT

## (undated) DEVICE — 3M™ COBAN™ NL STERILE NON-LATEX SELF-ADHERENT WRAP, 2086S, 6 IN X 5 YD (15 CM X 4,5 M), 12 ROLLS/CASE: Brand: 3M™ COBAN™

## (undated) DEVICE — COVER LT HNDL BLU PLAS

## (undated) DEVICE — ADHESIVE SKIN CLSR 0.7ML TOP DERMBND ADV

## (undated) DEVICE — TOWEL,STOP FLAG GOLD N-W: Brand: MEDLINE

## (undated) DEVICE — COVER,LIGHT HANDLE,FLX,1/PK: Brand: MEDLINE INDUSTRIES, INC.

## (undated) DEVICE — TUBING, SUCTION, 1/4" X 12', STRAIGHT: Brand: MEDLINE

## (undated) DEVICE — CLEANER,CAUTERY TIP,2X2",STERILE: Brand: MEDLINE

## (undated) DEVICE — BLADE SHV L13CM DIA4MM TAPR TIP SCIS LIKE CUT OVL OUTER

## (undated) DEVICE — SCISSORS SUT SM CLR CODE HNDL FOR OPN SURG CASES FIBERWIRE

## (undated) DEVICE — 1010 S-DRAPE TOWEL DRAPE 10/BX: Brand: STERI-DRAPE™

## (undated) DEVICE — SYSTEM SKIN CLSR 22CM DERMBND PRINEO

## (undated) DEVICE — COUNTER NDL 40 COUNT HLD 70 NUM FOAM BLK SGL MAG W BLDE REMV

## (undated) DEVICE — TUBING FLD MGMT Y DBL SPIK DUALWAVE

## (undated) DEVICE — SOLUTION IV 1000ML 0.9% SOD CHL

## (undated) DEVICE — PROBE ABLAT XL 90DEG ASPIR BPLR RF 1 PC ELECTRD ERGO HNDL

## (undated) DEVICE — CLEAR-TRAC 5.5 MM X 72 MM THREADED                                    CANNULA, WITH DISPOSABLE OBTURATOR,                                    BLUE, STERILE: Brand: CLEAR-TRAC

## (undated) DEVICE — APPLICATOR PREP 26ML 0.7% IOD POVACRYLEX 74% ISO ALC ST

## (undated) DEVICE — SOLUTION IV IRRIG LACTATED RINGERS 3000ML 2B7487

## (undated) DEVICE — INTENDED TO SUPPORT AND MAINTAIN THE POSITION OF AN ANESTHETIZED PATIENT DURING SURGERY: Brand: ERIN BEACH CHAIR FACE MASK

## (undated) DEVICE — SUTURE MCRYL SZ 4-0 L27IN ABSRB UD L19MM PS-2 1/2 CIR PRIM Y426H

## (undated) DEVICE — TUBING PMP L6FT CONT WAVE EXTN

## (undated) DEVICE — NEEDLE SUT PASS FOR ROT CUF LABRAL REP MULTFI SCORPION

## (undated) DEVICE — SUTURE FIBERTAPE FIBERWIRE SZ 2-0 30IN NONABSORB BLU AR72377

## (undated) DEVICE — 3M™ IOBAN™ 2 ANTIMICROBIAL INCISE DRAPE 6640EZ: Brand: IOBAN™ 2